# Patient Record
Sex: MALE | Race: WHITE | NOT HISPANIC OR LATINO | URBAN - METROPOLITAN AREA
[De-identification: names, ages, dates, MRNs, and addresses within clinical notes are randomized per-mention and may not be internally consistent; named-entity substitution may affect disease eponyms.]

---

## 2020-05-06 ENCOUNTER — EMERGENCY (EMERGENCY)
Facility: HOSPITAL | Age: 65
LOS: 0 days | Discharge: NURSING, REHAB OR LONG-TERM CARE | End: 2020-05-06
Attending: EMERGENCY MEDICINE | Admitting: EMERGENCY MEDICINE
Payer: COMMERCIAL

## 2020-05-06 VITALS
OXYGEN SATURATION: 100 % | DIASTOLIC BLOOD PRESSURE: 62 MMHG | SYSTOLIC BLOOD PRESSURE: 111 MMHG | HEART RATE: 95 BPM | TEMPERATURE: 96 F | RESPIRATION RATE: 16 BRPM

## 2020-05-06 DIAGNOSIS — E11.9 TYPE 2 DIABETES MELLITUS WITHOUT COMPLICATIONS: ICD-10-CM

## 2020-05-06 DIAGNOSIS — G20 PARKINSON'S DISEASE: ICD-10-CM

## 2020-05-06 DIAGNOSIS — Z79.899 OTHER LONG TERM (CURRENT) DRUG THERAPY: ICD-10-CM

## 2020-05-06 DIAGNOSIS — F03.90 UNSPECIFIED DEMENTIA WITHOUT BEHAVIORAL DISTURBANCE: ICD-10-CM

## 2020-05-06 DIAGNOSIS — Z87.891 PERSONAL HISTORY OF NICOTINE DEPENDENCE: ICD-10-CM

## 2020-05-06 DIAGNOSIS — R45.1 RESTLESSNESS AND AGITATION: ICD-10-CM

## 2020-05-06 DIAGNOSIS — Z79.82 LONG TERM (CURRENT) USE OF ASPIRIN: ICD-10-CM

## 2020-05-06 DIAGNOSIS — Z79.84 LONG TERM (CURRENT) USE OF ORAL HYPOGLYCEMIC DRUGS: ICD-10-CM

## 2020-05-06 LAB
ALBUMIN SERPL ELPH-MCNC: 4.5 G/DL — SIGNIFICANT CHANGE UP (ref 3.5–5.2)
ALP SERPL-CCNC: 92 U/L — SIGNIFICANT CHANGE UP (ref 30–115)
ALT FLD-CCNC: 7 U/L — SIGNIFICANT CHANGE UP (ref 0–41)
ANION GAP SERPL CALC-SCNC: 14 MMOL/L — SIGNIFICANT CHANGE UP (ref 7–14)
APAP SERPL-MCNC: <5 UG/ML — LOW (ref 10–30)
AST SERPL-CCNC: 13 U/L — SIGNIFICANT CHANGE UP (ref 0–41)
BASOPHILS # BLD AUTO: 0.03 K/UL — SIGNIFICANT CHANGE UP (ref 0–0.2)
BASOPHILS NFR BLD AUTO: 0.4 % — SIGNIFICANT CHANGE UP (ref 0–1)
BILIRUB SERPL-MCNC: 0.5 MG/DL — SIGNIFICANT CHANGE UP (ref 0.2–1.2)
BUN SERPL-MCNC: 37 MG/DL — HIGH (ref 10–20)
CALCIUM SERPL-MCNC: 9.5 MG/DL — SIGNIFICANT CHANGE UP (ref 8.5–10.1)
CHLORIDE SERPL-SCNC: 100 MMOL/L — SIGNIFICANT CHANGE UP (ref 98–110)
CO2 SERPL-SCNC: 25 MMOL/L — SIGNIFICANT CHANGE UP (ref 17–32)
CREAT SERPL-MCNC: 1.6 MG/DL — HIGH (ref 0.7–1.5)
EOSINOPHIL # BLD AUTO: 0.04 K/UL — SIGNIFICANT CHANGE UP (ref 0–0.7)
EOSINOPHIL NFR BLD AUTO: 0.5 % — SIGNIFICANT CHANGE UP (ref 0–8)
ETHANOL SERPL-MCNC: <10 MG/DL — SIGNIFICANT CHANGE UP
GLUCOSE SERPL-MCNC: 150 MG/DL — HIGH (ref 70–99)
HCT VFR BLD CALC: 36.8 % — LOW (ref 42–52)
HGB BLD-MCNC: 12.9 G/DL — LOW (ref 14–18)
IMM GRANULOCYTES NFR BLD AUTO: 0.4 % — HIGH (ref 0.1–0.3)
LYMPHOCYTES # BLD AUTO: 0.82 K/UL — LOW (ref 1.2–3.4)
LYMPHOCYTES # BLD AUTO: 9.7 % — LOW (ref 20.5–51.1)
MCHC RBC-ENTMCNC: 31.5 PG — HIGH (ref 27–31)
MCHC RBC-ENTMCNC: 35.1 G/DL — SIGNIFICANT CHANGE UP (ref 32–37)
MCV RBC AUTO: 89.8 FL — SIGNIFICANT CHANGE UP (ref 80–94)
MONOCYTES # BLD AUTO: 0.5 K/UL — SIGNIFICANT CHANGE UP (ref 0.1–0.6)
MONOCYTES NFR BLD AUTO: 5.9 % — SIGNIFICANT CHANGE UP (ref 1.7–9.3)
NEUTROPHILS # BLD AUTO: 7.07 K/UL — HIGH (ref 1.4–6.5)
NEUTROPHILS NFR BLD AUTO: 83.1 % — HIGH (ref 42.2–75.2)
NRBC # BLD: 0 /100 WBCS — SIGNIFICANT CHANGE UP (ref 0–0)
PLATELET # BLD AUTO: 223 K/UL — SIGNIFICANT CHANGE UP (ref 130–400)
POTASSIUM SERPL-MCNC: 4.9 MMOL/L — SIGNIFICANT CHANGE UP (ref 3.5–5)
POTASSIUM SERPL-SCNC: 4.9 MMOL/L — SIGNIFICANT CHANGE UP (ref 3.5–5)
PROT SERPL-MCNC: 7.3 G/DL — SIGNIFICANT CHANGE UP (ref 6–8)
RBC # BLD: 4.1 M/UL — LOW (ref 4.7–6.1)
RBC # FLD: 12.4 % — SIGNIFICANT CHANGE UP (ref 11.5–14.5)
SALICYLATES SERPL-MCNC: <0.3 MG/DL — LOW (ref 4–30)
SODIUM SERPL-SCNC: 139 MMOL/L — SIGNIFICANT CHANGE UP (ref 135–146)
WBC # BLD: 8.49 K/UL — SIGNIFICANT CHANGE UP (ref 4.8–10.8)
WBC # FLD AUTO: 8.49 K/UL — SIGNIFICANT CHANGE UP (ref 4.8–10.8)

## 2020-05-06 PROCEDURE — 90792 PSYCH DIAG EVAL W/MED SRVCS: CPT

## 2020-05-06 PROCEDURE — 99285 EMERGENCY DEPT VISIT HI MDM: CPT

## 2020-05-06 PROCEDURE — 70450 CT HEAD/BRAIN W/O DYE: CPT | Mod: 26

## 2020-05-06 NOTE — ED PROVIDER NOTE - OBJECTIVE STATEMENT
63 yo male with a pmh of DM, parkinson's, and dementia present from [facility] for multiple complaints. pt states he was sent because he was refused to take 2 doses of his medications. he states to experienced visual hallucinations for over 2 months but denies any SI/HI. denies any other symptoms including fevers, chill, headache, recent illness/travel, cough, abdominal pain, chest pain, or SOB. paperwork from [place] states pt has experiencing hallucinations, paranoia, word salad, self harm, aggression toward staff, and episodes of tremors w/ eyes rolling upward since March that worsened over the last 2 months.

## 2020-05-06 NOTE — ED PROVIDER NOTE - NS ED ROS FT
Constitutional: (-) fever  Eyes/ENT: (-) blurry vision, (-) epistaxis, (-) visual changes   Cardiovascular: (-) chest pain, (-) syncope  Respiratory: (-) cough, (-) shortness of breath  Gastrointestinal: (-) vomiting, (-) diarrhea  Genitourinary: (-) dysuria, (-) hesitancy, (-) frequency   Musculoskeletal: (-) neck pain, (-) back pain, (-) joint pain  Integumentary: (-) rash, (-) edema  Neurological: (-) headache, (-) altered mental status  Allergic/Immunologic: (-) pruritus

## 2020-05-06 NOTE — ED ADULT NURSE NOTE - OBJECTIVE STATEMENT
Pt brought in by ambulance from [facility]. As per EMT "He's here for a psych evaluation. He hasn't been taking his medications." As per paperwork "Self harm, word salad, paranoia, hallucinations, aggression toward staff, punching imaginary people. Episodes started in early March and getting worse in the last 2 weeks. Resident has also had episodes when his eyes roll upward and his tremor increases. MD requested full psychological evaluation."

## 2020-05-06 NOTE — ED ADULT TRIAGE NOTE - CHIEF COMPLAINT QUOTE
Pt brought in by ambulance from [facility]. As per EMT "He's here for a psych evaluation. He hasn't been taking his medications." Pt brought in by ambulance from [facility]. As per EMT "He's here for a psych evaluation. He hasn't been taking his medications." As per paperwork "Self harm, word salad, paranoia, hallucinations, aggression toward staff, punching imaginary people. Episodes started in early March and getting worse in the last 2 weeks. Resident has also had episodes when his eyes roll upward and his tremor increases. MD requested full psychological evaluation."

## 2020-05-06 NOTE — ED PROVIDER NOTE - CLINICAL SUMMARY MEDICAL DECISION MAKING FREE TEXT BOX
Patient is calm and co-operative asymptomatic at this time we have consulted psych who has  evaluated patient and deem him safe for return to his facility I will discharge at this time

## 2020-05-06 NOTE — ED ADULT NURSE NOTE - CHIEF COMPLAINT QUOTE
Pt brought in by ambulance from [facility]. As per EMT "He's here for a psych evaluation. He hasn't been taking his medications." As per paperwork "Self harm, word salad, paranoia, hallucinations, aggression toward staff, punching imaginary people. Episodes started in early March and getting worse in the last 2 weeks. Resident has also had episodes when his eyes roll upward and his tremor increases. MD requested full psychological evaluation."

## 2020-05-06 NOTE — ED PROVIDER NOTE - PATIENT PORTAL LINK FT
You can access the FollowMyHealth Patient Portal offered by [facility] by registering at the following website: [email or web address]. By joining [employer]’s FollowMyHealth portal, you will also be able to view your health information using other applications (apps) compatible with our system.

## 2020-05-06 NOTE — ED PROVIDER NOTE - PHYSICAL EXAMINATION
Gen: NAD, AOx3  Head: NCAT  HEENT: PERRL, oral mucosa moist, normal conjunctiva, oropharynx clear without exudate or erythema  Lung: CTAB, no respiratory distress, no wheezing, rales, rhonchi  CV: normal s1/s2, rrr, Normal perfusion, pulses 2+ throughout  Abd: soft, NTND, no CVA tenderness  Genitourinary: no pelvic tenderness  MSK: No edema, no visible deformities, full range of motion in all 4 extremities  Neuro: CN II-XII grossly intact, No focal neurologic deficits, no nystagmus/pronator drift, coordination/sensation intact, strength 5/5 BUE/BLE  Skin: No rash   Psych: normal affect Gen: NAD, AOx3  Head: NCAT  HEENT: PERRL, oral mucosa moist, normal conjunctiva, oropharynx clear without exudate or erythema  Lung: CTAB, no respiratory distress, no wheezing, rales, rhonchi  CV: normal s1/s2, rrr, Normal perfusion, pulses 2+ throughout  Abd: soft, NTND, no CVA tenderness  Genitourinary: no pelvic tenderness  MSK: No edema, no visible deformities, full range of motion in all 4 extremities  Neuro: CN II-XII grossly intact, No focal neurologic deficits, no nystagmus/pronator drift, coordination/sensation intact, strength 5/5 BUE/BLE  Skin: No rash   Psych: normal affect, well appearing, no aggression, no SI/HI, visual hallucinations

## 2020-05-06 NOTE — ED PROVIDER NOTE - ATTENDING CONTRIBUTION TO CARE
I was present for and supervised the key and critical aspects of the procedures performed during the care of the patient. Patient presents for evaluation of agitation prior to arrival he denies any fevers, chills headache, visual changes, chest pain or sob he denies any abdominal pain or back pain he has no focal deficits patient is calm and co-operative at this time we have consulted psych who evaluated patient and deem him safe for return to his facility

## 2020-05-06 NOTE — ED PROVIDER NOTE - NSFOLLOWUPINSTRUCTIONS_ED_ALL_ED_FT
Continue Seroquel 25mg twice a day as needed for agitation/depression. Please follow up with your primary care physician within 24-72 hours and return immediately if symptoms worsen.      Hallucinations    WHAT YOU NEED TO KNOW:    Hallucinations are things you see, hear, feel, taste, or smell that seem real but are not. Some hallucinations are temporary. Hallucinations that continue, interfere with daily activities, or worsen may be a sign of a serious medical or mental condition that needs treatment.    DISCHARGE INSTRUCTIONS:    Call 911 if you or someone else notices any of the following:     You want to harm yourself or someone else.      You hear voices telling you to harm yourself or someone else.      You have a seizure.      You are confused, do not know where you are, or are not making sense when you speak.    Seek care immediately if:     Your hallucinations come back after treatment.      You vomit several times in a row.      Your heartbeat or breathing is faster or slower than usual.       You have trouble breathing or shortness of breath.    Contact your healthcare provider if:     You have new hallucinations.      You have questions or concerns about your condition or care.    Medicines:     Medicines may be given to stop the hallucinations, reduce anxiety, or relax your muscles.      Take your medicine as directed. Contact your healthcare provider if you think your medicine is not helping or if you have side effects. Tell him or her if you are allergic to any medicine. Keep a list of the medicines, vitamins, and herbs you take. Include the amounts, and when and why you take them. Bring the list or the pill bottles to follow-up visits. Carry your medicine list with you in case of an emergency.    Follow up with your healthcare provider as directed: Write down your questions so you remember to ask them during your visits.       © Copyright [employer] 2020

## 2020-05-18 ENCOUNTER — INPATIENT (INPATIENT)
Facility: HOSPITAL | Age: 65
LOS: 11 days | Discharge: NURSING, REHAB OR LONG-TERM CARE | End: 2020-05-30
Attending: INTERNAL MEDICINE | Admitting: INTERNAL MEDICINE
Payer: COMMERCIAL

## 2020-05-18 VITALS
OXYGEN SATURATION: 97 % | SYSTOLIC BLOOD PRESSURE: 86 MMHG | DIASTOLIC BLOOD PRESSURE: 54 MMHG | RESPIRATION RATE: 19 BRPM | WEIGHT: 195.11 LBS | HEART RATE: 96 BPM | TEMPERATURE: 100 F

## 2020-05-18 DIAGNOSIS — N18.3 CHRONIC KIDNEY DISEASE, STAGE 3 (MODERATE): ICD-10-CM

## 2020-05-18 LAB
ALBUMIN SERPL ELPH-MCNC: 4.2 G/DL — SIGNIFICANT CHANGE UP (ref 3.5–5.2)
ALP SERPL-CCNC: 105 U/L — SIGNIFICANT CHANGE UP (ref 30–115)
ALT FLD-CCNC: <5 U/L — SIGNIFICANT CHANGE UP (ref 0–41)
ANION GAP SERPL CALC-SCNC: 22 MMOL/L — HIGH (ref 7–14)
APTT BLD: 25.8 SEC — LOW (ref 27–39.2)
AST SERPL-CCNC: 27 U/L — SIGNIFICANT CHANGE UP (ref 0–41)
BASOPHILS # BLD AUTO: 0.06 K/UL — SIGNIFICANT CHANGE UP (ref 0–0.2)
BASOPHILS NFR BLD AUTO: 0.6 % — SIGNIFICANT CHANGE UP (ref 0–1)
BILIRUB SERPL-MCNC: 0.5 MG/DL — SIGNIFICANT CHANGE UP (ref 0.2–1.2)
BLD GP AB SCN SERPL QL: SIGNIFICANT CHANGE UP
BUN SERPL-MCNC: 41 MG/DL — HIGH (ref 10–20)
CA-I SERPL-SCNC: 1.1 MMOL/L — LOW (ref 1.12–1.3)
CALCIUM SERPL-MCNC: 9.4 MG/DL — SIGNIFICANT CHANGE UP (ref 8.5–10.1)
CHLORIDE SERPL-SCNC: 101 MMOL/L — SIGNIFICANT CHANGE UP (ref 98–110)
CO2 SERPL-SCNC: 19 MMOL/L — SIGNIFICANT CHANGE UP (ref 17–32)
CREAT SERPL-MCNC: 2 MG/DL — HIGH (ref 0.7–1.5)
EOSINOPHIL # BLD AUTO: 0.07 K/UL — SIGNIFICANT CHANGE UP (ref 0–0.7)
EOSINOPHIL NFR BLD AUTO: 0.7 % — SIGNIFICANT CHANGE UP (ref 0–8)
GAS PNL BLDV: 141 MMOL/L — SIGNIFICANT CHANGE UP (ref 136–145)
GAS PNL BLDV: SIGNIFICANT CHANGE UP
GLUCOSE SERPL-MCNC: 84 MG/DL — SIGNIFICANT CHANGE UP (ref 70–99)
HCO3 BLDV-SCNC: 25 MMOL/L — SIGNIFICANT CHANGE UP (ref 22–29)
HCT VFR BLD CALC: 34.5 % — LOW (ref 42–52)
HCT VFR BLDA CALC: 52.3 % — HIGH (ref 34–44)
HGB BLD CALC-MCNC: 17.1 G/DL — SIGNIFICANT CHANGE UP (ref 14–18)
HGB BLD-MCNC: 11.9 G/DL — LOW (ref 14–18)
IMM GRANULOCYTES NFR BLD AUTO: 0.5 % — HIGH (ref 0.1–0.3)
INR BLD: 1.33 RATIO — HIGH (ref 0.65–1.3)
LACTATE BLDV-MCNC: 2.2 MMOL/L — HIGH (ref 0.5–1.6)
LACTATE SERPL-SCNC: 6.8 MMOL/L — CRITICAL HIGH (ref 0.7–2)
LYMPHOCYTES # BLD AUTO: 0.77 K/UL — LOW (ref 1.2–3.4)
LYMPHOCYTES # BLD AUTO: 7.9 % — LOW (ref 20.5–51.1)
MAGNESIUM SERPL-MCNC: 1.1 MG/DL — LOW (ref 1.8–2.4)
MCHC RBC-ENTMCNC: 31.2 PG — HIGH (ref 27–31)
MCHC RBC-ENTMCNC: 34.5 G/DL — SIGNIFICANT CHANGE UP (ref 32–37)
MCV RBC AUTO: 90.6 FL — SIGNIFICANT CHANGE UP (ref 80–94)
MONOCYTES # BLD AUTO: 0.66 K/UL — HIGH (ref 0.1–0.6)
MONOCYTES NFR BLD AUTO: 6.8 % — SIGNIFICANT CHANGE UP (ref 1.7–9.3)
NEUTROPHILS # BLD AUTO: 8.14 K/UL — HIGH (ref 1.4–6.5)
NEUTROPHILS NFR BLD AUTO: 83.5 % — HIGH (ref 42.2–75.2)
NRBC # BLD: 0 /100 WBCS — SIGNIFICANT CHANGE UP (ref 0–0)
NT-PROBNP SERPL-SCNC: 1002 PG/ML — HIGH (ref 0–300)
PCO2 BLDV: 43 MMHG — SIGNIFICANT CHANGE UP (ref 41–51)
PH BLDV: 7.38 — SIGNIFICANT CHANGE UP (ref 7.26–7.43)
PLATELET # BLD AUTO: 202 K/UL — SIGNIFICANT CHANGE UP (ref 130–400)
PO2 BLDV: 34 MMHG — SIGNIFICANT CHANGE UP (ref 20–40)
POTASSIUM BLDV-SCNC: 4 MMOL/L — SIGNIFICANT CHANGE UP (ref 3.3–5.6)
POTASSIUM SERPL-MCNC: 5 MMOL/L — SIGNIFICANT CHANGE UP (ref 3.5–5)
POTASSIUM SERPL-SCNC: 5 MMOL/L — SIGNIFICANT CHANGE UP (ref 3.5–5)
PROT SERPL-MCNC: 7.2 G/DL — SIGNIFICANT CHANGE UP (ref 6–8)
PROTHROM AB SERPL-ACNC: 15.3 SEC — HIGH (ref 9.95–12.87)
RBC # BLD: 3.81 M/UL — LOW (ref 4.7–6.1)
RBC # FLD: 12.6 % — SIGNIFICANT CHANGE UP (ref 11.5–14.5)
SAO2 % BLDV: 63 % — SIGNIFICANT CHANGE UP
SARS-COV-2 RNA SPEC QL NAA+PROBE: SIGNIFICANT CHANGE UP
SODIUM SERPL-SCNC: 142 MMOL/L — SIGNIFICANT CHANGE UP (ref 135–146)
TROPONIN T SERPL-MCNC: 0.02 NG/ML — HIGH
WBC # BLD: 9.75 K/UL — SIGNIFICANT CHANGE UP (ref 4.8–10.8)
WBC # FLD AUTO: 9.75 K/UL — SIGNIFICANT CHANGE UP (ref 4.8–10.8)

## 2020-05-18 PROCEDURE — 71275 CT ANGIOGRAPHY CHEST: CPT | Mod: 26

## 2020-05-18 PROCEDURE — 70450 CT HEAD/BRAIN W/O DYE: CPT | Mod: 26

## 2020-05-18 PROCEDURE — 99285 EMERGENCY DEPT VISIT HI MDM: CPT

## 2020-05-18 PROCEDURE — 72125 CT NECK SPINE W/O DYE: CPT | Mod: 26

## 2020-05-18 PROCEDURE — 99283 EMERGENCY DEPT VISIT LOW MDM: CPT

## 2020-05-18 RX ORDER — HEPARIN SODIUM 5000 [USP'U]/ML
5000 INJECTION INTRAVENOUS; SUBCUTANEOUS EVERY 8 HOURS
Refills: 0 | Status: DISCONTINUED | OUTPATIENT
Start: 2020-05-18 | End: 2020-05-27

## 2020-05-18 RX ORDER — CARBIDOPA AND LEVODOPA 25; 100 MG/1; MG/1
1 TABLET ORAL THREE TIMES A DAY
Refills: 0 | Status: DISCONTINUED | OUTPATIENT
Start: 2020-05-18 | End: 2020-05-20

## 2020-05-18 RX ORDER — QUETIAPINE FUMARATE 200 MG/1
0 TABLET, FILM COATED ORAL
Qty: 0 | Refills: 0 | DISCHARGE

## 2020-05-18 RX ORDER — FLUDROCORTISONE ACETATE 0.1 MG/1
0.1 TABLET ORAL DAILY
Refills: 0 | Status: DISCONTINUED | OUTPATIENT
Start: 2020-05-18 | End: 2020-05-20

## 2020-05-18 RX ORDER — FAMOTIDINE 10 MG/ML
0 INJECTION INTRAVENOUS
Qty: 0 | Refills: 0 | DISCHARGE

## 2020-05-18 RX ORDER — SENNA PLUS 8.6 MG/1
2 TABLET ORAL AT BEDTIME
Refills: 0 | Status: DISCONTINUED | OUTPATIENT
Start: 2020-05-18 | End: 2020-05-30

## 2020-05-18 RX ORDER — ATORVASTATIN CALCIUM 80 MG/1
20 TABLET, FILM COATED ORAL AT BEDTIME
Refills: 0 | Status: DISCONTINUED | OUTPATIENT
Start: 2020-05-18 | End: 2020-05-30

## 2020-05-18 RX ORDER — INSULIN LISPRO 100/ML
VIAL (ML) SUBCUTANEOUS
Refills: 0 | Status: DISCONTINUED | OUTPATIENT
Start: 2020-05-18 | End: 2020-05-27

## 2020-05-18 RX ORDER — QUETIAPINE FUMARATE 200 MG/1
25 TABLET, FILM COATED ORAL AT BEDTIME
Refills: 0 | Status: DISCONTINUED | OUTPATIENT
Start: 2020-05-18 | End: 2020-05-20

## 2020-05-18 RX ORDER — LATANOPROST 0.05 MG/ML
1 SOLUTION/ DROPS OPHTHALMIC; TOPICAL AT BEDTIME
Refills: 0 | Status: DISCONTINUED | OUTPATIENT
Start: 2020-05-18 | End: 2020-05-30

## 2020-05-18 RX ORDER — ASPIRIN/CALCIUM CARB/MAGNESIUM 324 MG
81 TABLET ORAL DAILY
Refills: 0 | Status: DISCONTINUED | OUTPATIENT
Start: 2020-05-18 | End: 2020-05-29

## 2020-05-18 RX ORDER — SODIUM CHLORIDE 9 MG/ML
1000 INJECTION INTRAMUSCULAR; INTRAVENOUS; SUBCUTANEOUS
Refills: 0 | Status: DISCONTINUED | OUTPATIENT
Start: 2020-05-18 | End: 2020-05-19

## 2020-05-18 RX ORDER — FAMOTIDINE 10 MG/ML
20 INJECTION INTRAVENOUS DAILY
Refills: 0 | Status: DISCONTINUED | OUTPATIENT
Start: 2020-05-18 | End: 2020-05-30

## 2020-05-18 RX ORDER — ACETAMINOPHEN 500 MG
650 TABLET ORAL EVERY 6 HOURS
Refills: 0 | Status: DISCONTINUED | OUTPATIENT
Start: 2020-05-18 | End: 2020-05-30

## 2020-05-18 NOTE — CONSULT NOTE ADULT - ASSESSMENT
ASSESSMENT:  64y old m s/p mechanical fall at NH. +HT, -LOC, -AC. Pt was transferred from [facility] for hypotension in setting of small pericardial fluid on FAST exam. Pt vitally stable in [place] ED, hyperTN. Ct head negative. Lactate 6.8, trop 0.02. LUBNA cr. 2.0    PLAN:    - No acute surgical intervention  - Trauma labs: cbc, cmp, mag phos, T&S, coags  - Repeat Lactate after resuscitation   - F/u Trauma imaging    -  d/w  Dr. Montaño ASSESSMENT:  64y old m s/p mechanical fall at NH. +HT, -LOC, -AC. Pt was transferred from [facility] for hypotension in setting of small pericardial fluid on FAST exam. Pt vitally stable in [place] ED, hyperTN. Ct head negative. Lactate 6.8, trop 0.02. LUBNA cr. 2.0    Traumatic injuries: compatible with non-displaced right 7th rib fx    PLAN:    - No acute surgical intervention  - Encourage incentive spirometer  - Cleared from trauma service   - Tertiary trauma survey in 24 hours    -  d/w  Dr. Montaño ASSESSMENT:  64y old m s/p mechanical fall at NH. +HT, -LOC, -AC. Pt was transferred from [facility] for hypotension in setting of small pericardial fluid on FAST exam. Pt vitally stable in [place] ED, hyperTN. Ct head negative. Lactate 6.8, trop 0.02. LUBNA cr. 2.0    Traumatic injuries: compatible with non-displaced right 7th rib fx    PLAN:    - No acute surgical intervention, Encourage incentive spirometer. pulling 3500cc on IS.  - Cleared from trauma service   - Tertiary trauma survey in 24 hours    -  d/w  Dr. Montaño

## 2020-05-18 NOTE — ED PROVIDER NOTE - ATTENDING CONTRIBUTION TO CARE
64 yr old male hx of parkinsons, DM, baby asa here for eval of weakness and fall. pt states he feels sick, has fallen a few times today. denies fever, chills, diarrhea. on exam pt chronically ill appearing, no distress, ncat, s1s2 ctab soft nt/nd, moving all ext gcs 15, left lower rib ttp, ttp lower lumbar.     pt initially hypotensive, improved with less than 1 L of ivf,. bedside sono showing + pericardial effusion.   spoke with trauma team at [place], will transfer. 64 yr old male hx of parkinsons, DM, baby asa here for eval of weakness and fall. pt states he feels sick, has fallen a few times today. denies fever, chills, diarrhea. Admits to hitting head. On exam pt chronically ill appearing, no distress, ncat, s1s2 ctab soft nt/nd, moving all ext gcs 15, left lower rib ttp, ttp lower lumbar.     pt initially hypotensive, improved with less than 1 L of ivf,. bedside sono showing + pericardial effusion.   spoke with trauma team at [place], will transfer.

## 2020-05-18 NOTE — ED PROVIDER NOTE - OBJECTIVE STATEMENT
63 yo M with PMHx of DM, mild dementia, HTN, parkinson's who was BIBEMS from [facility] for hypotension and falls. Per pt, states that he has been feeling lightheaded and dizzy for the past 2-3 wks without alleviating/aggravating factors. Today had 2 falls but does not remember mechanism. Hit the back of his head against the ground, no LOC. Currently complaining of pain to back of head, mid thoracic spine, R lower ribs. No fever, chills, cp, sob, abd pain, nausea, vomiting, dysuria, leg swelling. On ASA. Upon EMS arrival, BP 90s/50s. 65 yo M with PMHx of DM, mild dementia, HTN, parkinson's who was BIBEMS from [facility] for hypotension and falls. Per pt, states that he has been feeling lightheaded and dizzy for the past 2-3 wks without alleviating/aggravating factors. Today had 2 falls but does not remember mechanism. Hit the back of his head against the ground, no LOC. Currently complaining of pain to back of head, mid thoracic spine, R lower ribs. No fever, chills, cp, sob, abd pain, nausea, vomiting, dysuria, leg swelling, hematochezia, melena. On ASA. Upon EMS arrival, BP 90s/50s.

## 2020-05-18 NOTE — ED ADULT NURSE REASSESSMENT NOTE - NS ED NURSE REASSESS COMMENT FT1
Pt arrived to the ER as a transfer from [facility], pt is AOx3. 100% on room air  02. HR NS 90bpm. Pt is afebrile. IV dressing dry and intact, flushing without difficulty. Pt placed on stretcher and brought  to room.

## 2020-05-18 NOTE — ED ADULT TRIAGE NOTE - CHIEF COMPLAINT QUOTE
EARNEST from [place] as per EMS "He was walking about two hours ago and fell. and he has chest pain, no loc". pt currently on ASA EC 81 mg, fs 106

## 2020-05-18 NOTE — ED ADULT NURSE REASSESSMENT NOTE - NS ED NURSE REASSESS COMMENT FT1
Pt scheduled and ordered to go to CT. Pt updated on continuing plan of care. Pt verbalized an understanding of plan of care. No further questions at this time. Awaiting transport for CT. Will continue to monitor.

## 2020-05-18 NOTE — ED PROCEDURE NOTE - CPROC ED INDICATIONS1
GERD: AVOID tomatoes, onions, fried foods, caffeine, peppermint, alcohol, cigarettes. Stay upright for two hours after meals and keep head end of bed at 30 degree angle  Asymptomatic on present regimen   hypotension

## 2020-05-18 NOTE — ED ADULT NURSE NOTE - NS ED NURSE AMBULANCES2
"Yolande Kiran LPN               "
[facility]

## 2020-05-18 NOTE — ED PROVIDER NOTE - CARE PLAN
Principal Discharge DX:	Fall  Secondary Diagnosis:	Hypotension  Secondary Diagnosis:	Pericardial effusion

## 2020-05-18 NOTE — H&P ADULT - ASSESSMENT
64 year old gentleman with a history of Parkinson's disease (w/ mild dementia), DM2, and HTN who was BIBEMS from [facility] for multiple falls on the day of presentation, associated with a subacute decline in mental and functional status per nursing home report.    #Recurrent falls w/ acute R 7th rib fracture: ddx includes mechanical fall from postural/gait instability 2/2 to Parkinson's vs orthostasis 2/2 to Parkinson's induced autonomic dysfunction (would explain the hypotension and improvement without significant fluids and his description of lightheadedness) vs cardiogenic etiology (patient complaining of palpitations, also with pericardial effusion on POCUS, BNP slightly elevated although seems euvolemic on exam)  -TTE (no prior records in our system)  -continue with fludrocortisone 0.1mg - consider increasing if positive orthostatics   -trend trops x 2 (first sample hemolyzed, EKG NSR w/ no ST-segment changes, unlikely ACS)  -check orthostatics  -check B12. folate, TSH  -PT/rehab evaluation  -pain control prn    #Parkinson's disease w/ mild dementia: per nursing home, has had a worsening of his confusion and forgetfulness recently  -continue with carbidopa/levadopa 25mg-100mg tid  -continue with seroquel 25mg qhs  -avoid first generation antipsychotics if agitated as it may worsen his Parkinsonism - can try extra doses of seroquel prn for agitation    #Lactic acidosis  -unclear etiology, may be related to chronic metformin use  -trending down with minimal fluids    #LUBNA on CKD3?  -baseline SCr 1.6 earlier in May, now 2.0 on admission  -likely pre-renal in setting of poor PO intake  -will start gentle hydration with NS @ 50mL/hr  -send urine lytes  -trend daily SCr    #Normocytic anemia: hemoglobin around baseline  -check B12, folate, TSH    #DM  -hold home meformin and glipizide  -check FS qac and qhs  -start sliding scale   -start basal bolus if consistently elevated >200    #HLD  -continue with simvastatin 20mg  -continue with aspirin 81mg qd     #HTN: not on any home meds  -monitor BP and orthostatics  -start low dose amlodipine 5mg if needed for BP control  ________________________________________________________________  DVT ppx: heparin subq  GI ppx: pepcid (home med)  Activity: ambulate as tolerated  Diet: consistent carb  Code status: full code    Dispo: admit to regular medicine floor (COVID negative). Needs echo, repeat labs, PT/rehab consult. From [facility]

## 2020-05-18 NOTE — ED ADULT NURSE NOTE - NSIMPLEMENTINTERV_GEN_ALL_ED
Implemented All Fall with Harm Risk Interventions:  [place] to call system. Call bell, personal items and telephone within reach. Instruct patient to call for assistance. Room bathroom lighting operational. Non-slip footwear when patient is off stretcher. Physically safe environment: no spills, clutter or unnecessary equipment. Stretcher in lowest position, wheels locked, appropriate side rails in place. Provide visual cue, wrist band, yellow gown, etc. Monitor gait and stability. Monitor for mental status changes and reorient to person, place, and time. Review medications for side effects contributing to fall risk. Reinforce activity limits and safety measures with patient and family. Provide visual clues: red socks.

## 2020-05-18 NOTE — ED PROVIDER NOTE - PROGRESS NOTE DETAILS
systolic bp 123 after 500 cc of fluids, pt noted to have small pericardial effusion. will call trauma team discussed with dr vargas, [facility] for completion of trauma ibrahim. pt with pericardial effusion on US exam. ems here for patient. no change in condition TC: Late entry. 65 yo M with PMHx of DM, mild dementia, HTN, parkinson's who was BIBEMS from [facility] for dizziness x 2-3 wks and 2 falls today. Pt unable to recall mechanism or if he had preceding cardiac sx. +head trauma, no LOC, on ASA. Here in ED, BP 80s/50s, HR 90s-100s, rectal temp 99.8. +tenderness to R lower ribs and midline T-spine. Ordered labs, ekg, xrays, panscan. POCUS showed pericardial effusion. Pt transferred to [place] ED for trauma eval. BP 140s/80s upon departure. TC: Late entry. 65 yo M with PMHx of DM, mild dementia, HTN, parkinson's who was BIBEMS from [facility] for dizziness x 2-3 wks and 2 falls today. Pt unable to recall mechanism or if he had preceding cardiac sx. +head trauma, no LOC, on ASA. Here in ED, BP 80s/50s, HR 90s-100s, rectal temp 99.8. +tenderness to R lower ribs and midline T-spine. Ordered labs, ekg, xrays, panscan. POCUS showed pericardial effusion. Pt transferred to [place] ED for trauma eval. BP 140s/80s upon departure. Spoke with son Julius ([phone]) who is aware pt was transferred [place], and updated on findings/plan, has no further questions at this time. SR: patient arrived to north, vitals stable patient c/o left upper back pain. CT scans ordered. cleared from trauma. repeat lactate improved from 6.8 to 2.2. will admit to medicine cleared from trauma. repeat lactate improved from 6.8 to 2.2. will admit to medicine. spoke with son and updated him on plan.

## 2020-05-18 NOTE — H&P ADULT - NSHPPHYSICALEXAM_GEN_ALL_CORE
VITAL SIGNS: Afebrile, vital signs stable  CONSTITUTIONAL: Well-developed; well-nourished; in no acute distress.  SKIN: Skin exam is warm and dry, no acute rash.  HEAD: Normocephalic; atraumatic.  EYES: Pupils equal round reactive to light, Extraocular movements intact; conjunctiva and sclera clear.  ENT: No nasal discharge; airway clear. Moist mucus membranes.  NECK: Supple; non tender. No rigidity  CARD: Regular rate and rhythm. Normal S1, S2. 2/6 holosystolic murmur heard at RUSB  RESP: Lungs clear to auscultation bilaterally. No wheezes, rales or rhonchi. Mild point tenderness over R lower chest  ABD: Abdomen soft; non-tender; non-distended;  no hepatosplenomegaly. No costovertebral angle tenderness.   EXT: Normal ROM. No clubbing, cyanosis or edema. No calf tenderness to palpation.  NEURO: Alert. Oriented to person, knows it is the hospital but not which one, does not know city. Knows it is 2020 and Irma  is president. States he is in the hospital due to "abnormal labs". Mild R sided facial droop. Cranial nerves otherwise intact. No motor or sensory deficits. Bilateral tremors of upper extremity, R>L  PSYCH: Cooperative. at times will forget where he is and state "I need to go to work". Is easily distracted (i.e. when he heard a bell ring he said "they're calling for me)

## 2020-05-18 NOTE — ED PROVIDER NOTE - CLINICAL SUMMARY MEDICAL DECISION MAKING FREE TEXT BOX
EY: Patient transferred north from south by Dr. harris, patient evaluated for trauma, cleared by trauma service, will admit to medicine, lactate improved.

## 2020-05-18 NOTE — H&P ADULT - HISTORY OF PRESENT ILLNESS
Patient is a 64 year old gentleman with a history of Parkinson's disease (w/ mild dementia), DM2, and HTN who was BIBEMS from [facility] for multiple falls on the day of presentation. Per patient history, he had 3 falls today. The first two were early in the morning, the last one was in the evening. He states that he had been standing for a few minutes before each fall and suddenly felt lightheaded/"woozy". He fell back and hit his head all three times. He denies any preceding confusion, chest pain, shortness of breath but does not endorse feeling as if his heart was racing throughout the day. He states after the fall he was able to get back up right away with no loss of consciousness or post-fall confusion. He denies any associated urinary/fecal incontinence. He states that he has been feeling palpitations intermittently for the past few months. ROS negative for any fevers/chills, vision changes, sore throat/runny nose, chest pain, abdominal pain, diarrhea/constipation. Patient reports has recently stared walking with a walker for stability for the past few months. Per [facility], patient has been having more frequent falls over the last few days and after two falls today they decided to have him sent in for reevaluation. They have also noticed a decline in his mental status where has been more confused and forgetful recently. Linton corroborates that there is no documentation of LOC or incontinence with falls today.    Patient initially presented to [facility]. BP on arrival was 90s/50s, HR 96, with lactate of 6.8; point of care ultrasound demonstrated small pericardial effusion. He was transferred to [place] for further workup. His BP on presentation to [place] was 146/84, HR 94, lactate 2.2 after receiving 1L of LR. Trauma workup was negative except for non-displaced fracture of R lateral 7th rib. Patient was cleared by trauma service and will be admitted to medicine for further workup of recurrent falls.

## 2020-05-18 NOTE — ED PROVIDER NOTE - PHYSICAL EXAMINATION
CONSTITUTIONAL: well developed, well nourished, no acute distress  TRAUMA: ABC intact, GCS 15  HEAD: normocephalic, tenderness to posterior occiput  EYES: PERRL at 3 mm, EOMI, no raccoon eyes, pale conjunctiva  ENT: no nasal discharge, no septal hematoma, no lakhani sign, moist mucous membranes, no mandibular instability, no mandibular malalignment  NECK: no midline tenderness, no stepoffs, no deformity  CV: regular rate, regular rhythm, equal distal pulses  RESP: lungs clear to auscultation bilaterally, normal work of breathing, symmetric rise and fall of chest   CHEST: lower anterior R rib tenderness, no crepitus, no clavicular deformity/tenting  ABD: soft, nondistended, nontender, no rebound, no guarding,  no pelvic instability  BACK: + midline T- spine tenderness, no midline L/S-spine tenderness, no stepoffs, no deformity  EXT: no obvious deformity, no tenderness of extremities, full ROM, cap refill < 2 seconds  SKIN: no rashes, no lacerations, no abrasions  NEURO: A&Ox3, moves all extremities, sensation grossly intact throughout, no focal neurological deficits, GCS 15  PSYCH: normal mood, appropriate affect CONSTITUTIONAL: well developed, well nourished, no acute distress  TRAUMA: ABC intact, GCS 15  HEAD: normocephalic, tenderness to posterior occiput  EYES: PERRL at 3 mm, EOMI, no raccoon eyes, pale conjunctiva  ENT: no nasal discharge, no septal hematoma, no lakhani sign, moist mucous membranes, no mandibular instability, no mandibular malalignment  NECK: no midline tenderness, no stepoffs, no deformity  CV: regular rate, regular rhythm, equal distal pulses  RESP: lungs clear to auscultation bilaterally, normal work of breathing, symmetric rise and fall of chest   CHEST: lower anterior R rib tenderness, no crepitus, no clavicular deformity/tenting  ABD: soft, nondistended, nontender, no rebound, no guarding,  no pelvic instability  BACK: + midline T- spine tenderness, no midline L/S-spine tenderness, no stepoffs, no deformity  RECTAL: no gross blood per rectum, soft brown stool, chaperone RN Dinesh present  EXT: no obvious deformity, no tenderness of extremities, full ROM, cap refill < 2 seconds  SKIN: no rashes, no lacerations, no abrasions  NEURO: A&Ox3, moves all extremities, sensation grossly intact throughout, no focal neurological deficits, GCS 15  PSYCH: normal mood, appropriate affect

## 2020-05-18 NOTE — H&P ADULT - NSHPREVIEWOFSYSTEMS_GEN_ALL_CORE
Review of Systems:  CONSTITUTIONAL - No fever, No diaphoresis, No weight change  SKIN - No rash  HEMATOLOGIC - No abnormal bleeding or bruising  EYES - No eye pain, No blurred vision  ENT - No change in hearing, No sore throat, No neck pain, No rhinorrhea, No ear pain  RESPIRATORY - No shortness of breath, No cough  CARDIAC -No chest pain, No palpitations  GI - No abdominal pain, No nausea, No vomiting, No diarrhea, No constipation, No bright red blood per rectum or melena. No flank pain  - No dysuria, frequency, hematuria.   ENDO - No polydypsia, No polyuria, No heat/cold intolerance  MUSCULOSKELETAL - No joint paint, No swelling, No back pain  NEUROLOGIC - No numbness, No focal weakness, No headache, No dizziness  All other systems negative, unless specified in HPI

## 2020-05-18 NOTE — H&P ADULT - ATTENDING COMMENTS
I saw and evaluated the patient. I have reviewed and agree with the findings and plan of care as documented above in the resident’s note (unless indicated differently below). Any necessary changes were made in the body of the text.    Recurrent falls in settings of Parkinson's disease (with postural instability, cogwheel rigidity and bradycardia) and dementia; doubt cardiac etiology (pt recalls the falls and hx is more suggestive of them being mechanical in etiology.   The lactic acid elevation may have been secondary to dehydration and hypoperfusion - ?parkinson associated autonomic dysfunction (pt was relatively hypotensive at the time of presentation).   Mild LUBNA - agree with gentle hydration.    Plan:   Orthostats  ECHO  Hydration  B12/folate/TSH  PT/Rehab  Meds as dosed I saw and evaluated the patient. I have reviewed and agree with the findings and plan of care as documented above in the resident’s note (unless indicated differently below). Any necessary changes were made in the body of the text.    65 yo M pt presenting after multiple falls on the day of presentation. Pt is NH resident. He says that he remembers falling and had no prodromal symptoms prior to the fall. Says that he lost his footing. Denies LOC. Denies any other symptoms. Says that he feels well at the time of this eval. Of note, as per the patient's NH, the patient has also had episodes of confusion.    ROS:  Constitutional: no fevers; no chills  Eyes: no conjunctivitis; no itching  ENT: no dysphagia; no odynophagia  CVS: no LE swelling; no PND; no chest pain  Resp: no SOB; no coughing  GI: no nausea; no vomiting; no diarrhea; no abd pain  : no dysuria; no hematuria  MSK: +falls  Neuro: no focal weakness  All other systems reviewed and are negative    PMHx, SurHx, Home medications, FHx and Soc Hx as documented above.    Vitals: BP = 183/98; P = 82; T = 98; RR 18; SpO2 of 98 on room air  General: appears stated age; cooperative  Eyes: anicteric sclerae; moist conjunctiva  HENT: AT/NC; clear oropharynx  Neck: supple; trachea midline  Lungs: lungs cta b/l; no wheezing, rales or rhonci  CVS: RRR; S1 and S2 w/o MRG’s  Abd: BS+; soft and non-tender to palpation x 4; no masses or HSM  Extremities: pulses 2+ b/l; non edematous  Psych: appropriate affect; alert and oriented to person but thought he was at home and not in the hospital; knew year  Neuro: CN II-XII intact; str 5/5 all extremities; sensation – grossly intact; tremors noted (w/ arms extended > on rt); some cogwheeling  Skin: no rashes, ulcers or nodules    Labs w/ CPK elevation  Imaginth rib fx right, no intracranial abnormalities  EKG: NSR; qTC 411    Assessment:  Recurrent falls in settings of Parkinson's disease (with postural instability, cogwheel rigidity and bradycardia) and dementia; doubt cardiac etiology (pt recalls the falls and hx is more suggestive of them being mechanical in etiology.   The lactic acid elevation may have been secondary to dehydration and hypoperfusion - ?parkinson associated autonomic dysfunction (pt was relatively hypotensive at the time of presentation).   Mild LUBNA - agree with gentle hydration.  CPK elevation 2/2 fall (mild)    Plan:   (1) Orthostats  (2) ECHO  (3) Hydration  (4) B12/folate/TSH  (5) PT/Rehab  (6) Meds as dosed    Code status: full code

## 2020-05-18 NOTE — ED PROVIDER NOTE - NS ED ROS FT
GEN:  no fever, no chills, + generalized weakness  NEURO: + headache, + dizziness  ENT: no sore throat, no runny nose  CV:  no chest pain, no palpitations  RESP:  no sob, no cough  GI:  no nausea, no vomiting, no abdominal pain, no diarrhea  :  no dysuria, no urinary frequency, no hematuria  MSK:  + back pain, no edema  SKIN:  no rash, no bruising  HEME: no hematochezia, no melena

## 2020-05-19 PROBLEM — F03.90 UNSPECIFIED DEMENTIA WITHOUT BEHAVIORAL DISTURBANCE: Chronic | Status: ACTIVE | Noted: 2020-05-06

## 2020-05-19 PROBLEM — G20 PARKINSON'S DISEASE: Chronic | Status: ACTIVE | Noted: 2020-05-06

## 2020-05-19 PROBLEM — E13.9 OTHER SPECIFIED DIABETES MELLITUS WITHOUT COMPLICATIONS: Chronic | Status: ACTIVE | Noted: 2020-05-06

## 2020-05-19 LAB
ALBUMIN SERPL ELPH-MCNC: 4.1 G/DL — SIGNIFICANT CHANGE UP (ref 3.5–5.2)
ALP SERPL-CCNC: 106 U/L — SIGNIFICANT CHANGE UP (ref 30–115)
ALT FLD-CCNC: 11 U/L — SIGNIFICANT CHANGE UP (ref 0–41)
ANION GAP SERPL CALC-SCNC: 15 MMOL/L — HIGH (ref 7–14)
AST SERPL-CCNC: 48 U/L — HIGH (ref 0–41)
BASOPHILS # BLD AUTO: 0.04 K/UL — SIGNIFICANT CHANGE UP (ref 0–0.2)
BASOPHILS NFR BLD AUTO: 0.6 % — SIGNIFICANT CHANGE UP (ref 0–1)
BILIRUB SERPL-MCNC: 0.6 MG/DL — SIGNIFICANT CHANGE UP (ref 0.2–1.2)
BUN SERPL-MCNC: 33 MG/DL — HIGH (ref 10–20)
CALCIUM SERPL-MCNC: 8.8 MG/DL — SIGNIFICANT CHANGE UP (ref 8.5–10.1)
CHLORIDE SERPL-SCNC: 105 MMOL/L — SIGNIFICANT CHANGE UP (ref 98–110)
CK MB CFR SERPL CALC: 18.9 NG/ML — HIGH (ref 0.6–6.3)
CK MB CFR SERPL CALC: 22.3 NG/ML — HIGH (ref 0.6–6.3)
CK SERPL-CCNC: 1520 U/L — HIGH (ref 0–225)
CK SERPL-CCNC: 1783 U/L — HIGH (ref 0–225)
CK SERPL-CCNC: 1837 U/L — HIGH (ref 0–225)
CO2 SERPL-SCNC: 24 MMOL/L — SIGNIFICANT CHANGE UP (ref 17–32)
CREAT SERPL-MCNC: 1.1 MG/DL — SIGNIFICANT CHANGE UP (ref 0.7–1.5)
EOSINOPHIL # BLD AUTO: 0.14 K/UL — SIGNIFICANT CHANGE UP (ref 0–0.7)
EOSINOPHIL NFR BLD AUTO: 2.1 % — SIGNIFICANT CHANGE UP (ref 0–8)
FOLATE SERPL-MCNC: 11.5 NG/ML — SIGNIFICANT CHANGE UP
GLUCOSE BLDC GLUCOMTR-MCNC: 120 MG/DL — HIGH (ref 70–99)
GLUCOSE BLDC GLUCOMTR-MCNC: 124 MG/DL — HIGH (ref 70–99)
GLUCOSE BLDC GLUCOMTR-MCNC: 86 MG/DL — SIGNIFICANT CHANGE UP (ref 70–99)
GLUCOSE SERPL-MCNC: 82 MG/DL — SIGNIFICANT CHANGE UP (ref 70–99)
HCT VFR BLD CALC: 34.3 % — LOW (ref 42–52)
HCV AB S/CO SERPL IA: 0.04 COI — SIGNIFICANT CHANGE UP
HCV AB SERPL-IMP: SIGNIFICANT CHANGE UP
HGB BLD-MCNC: 11.8 G/DL — LOW (ref 14–18)
IMM GRANULOCYTES NFR BLD AUTO: 0.4 % — HIGH (ref 0.1–0.3)
LACTATE SERPL-SCNC: 0.9 MMOL/L — SIGNIFICANT CHANGE UP (ref 0.7–2)
LACTATE SERPL-SCNC: 1 MMOL/L — SIGNIFICANT CHANGE UP (ref 0.7–2)
LYMPHOCYTES # BLD AUTO: 1.19 K/UL — LOW (ref 1.2–3.4)
LYMPHOCYTES # BLD AUTO: 17.6 % — LOW (ref 20.5–51.1)
MAGNESIUM SERPL-MCNC: 1.2 MG/DL — LOW (ref 1.8–2.4)
MCHC RBC-ENTMCNC: 31 PG — SIGNIFICANT CHANGE UP (ref 27–31)
MCHC RBC-ENTMCNC: 34.4 G/DL — SIGNIFICANT CHANGE UP (ref 32–37)
MCV RBC AUTO: 90 FL — SIGNIFICANT CHANGE UP (ref 80–94)
MONOCYTES # BLD AUTO: 0.51 K/UL — SIGNIFICANT CHANGE UP (ref 0.1–0.6)
MONOCYTES NFR BLD AUTO: 7.5 % — SIGNIFICANT CHANGE UP (ref 1.7–9.3)
NEUTROPHILS # BLD AUTO: 4.86 K/UL — SIGNIFICANT CHANGE UP (ref 1.4–6.5)
NEUTROPHILS NFR BLD AUTO: 71.8 % — SIGNIFICANT CHANGE UP (ref 42.2–75.2)
NRBC # BLD: 0 /100 WBCS — SIGNIFICANT CHANGE UP (ref 0–0)
PLATELET # BLD AUTO: 209 K/UL — SIGNIFICANT CHANGE UP (ref 130–400)
POTASSIUM SERPL-MCNC: 3.7 MMOL/L — SIGNIFICANT CHANGE UP (ref 3.5–5)
POTASSIUM SERPL-SCNC: 3.7 MMOL/L — SIGNIFICANT CHANGE UP (ref 3.5–5)
PROT SERPL-MCNC: 6.8 G/DL — SIGNIFICANT CHANGE UP (ref 6–8)
RBC # BLD: 3.81 M/UL — LOW (ref 4.7–6.1)
RBC # FLD: 12.5 % — SIGNIFICANT CHANGE UP (ref 11.5–14.5)
SODIUM SERPL-SCNC: 144 MMOL/L — SIGNIFICANT CHANGE UP (ref 135–146)
TROPONIN T SERPL-MCNC: 0.01 NG/ML — SIGNIFICANT CHANGE UP
TROPONIN T SERPL-MCNC: 0.01 NG/ML — SIGNIFICANT CHANGE UP
TSH SERPL-MCNC: 1.76 UIU/ML — SIGNIFICANT CHANGE UP (ref 0.27–4.2)
VIT B12 SERPL-MCNC: 461 PG/ML — SIGNIFICANT CHANGE UP (ref 232–1245)
WBC # BLD: 6.77 K/UL — SIGNIFICANT CHANGE UP (ref 4.8–10.8)
WBC # FLD AUTO: 6.77 K/UL — SIGNIFICANT CHANGE UP (ref 4.8–10.8)

## 2020-05-19 PROCEDURE — 99223 1ST HOSP IP/OBS HIGH 75: CPT

## 2020-05-19 PROCEDURE — 93306 TTE W/DOPPLER COMPLETE: CPT | Mod: 26

## 2020-05-19 RX ORDER — MAGNESIUM SULFATE 500 MG/ML
2 VIAL (ML) INJECTION EVERY 4 HOURS
Refills: 0 | Status: DISCONTINUED | OUTPATIENT
Start: 2020-05-19 | End: 2020-05-19

## 2020-05-19 RX ORDER — SODIUM CHLORIDE 9 MG/ML
1000 INJECTION INTRAMUSCULAR; INTRAVENOUS; SUBCUTANEOUS
Refills: 0 | Status: DISCONTINUED | OUTPATIENT
Start: 2020-05-19 | End: 2020-05-20

## 2020-05-19 RX ORDER — MAGNESIUM SULFATE 500 MG/ML
2 VIAL (ML) INJECTION
Refills: 0 | Status: COMPLETED | OUTPATIENT
Start: 2020-05-19 | End: 2020-05-19

## 2020-05-19 RX ORDER — TIMOLOL 0.5 %
1 DROPS OPHTHALMIC (EYE) AT BEDTIME
Refills: 0 | Status: DISCONTINUED | OUTPATIENT
Start: 2020-05-19 | End: 2020-05-30

## 2020-05-19 RX ADMIN — Medication 1 DROP(S): at 21:01

## 2020-05-19 RX ADMIN — CARBIDOPA AND LEVODOPA 1 TABLET(S): 25; 100 TABLET ORAL at 20:57

## 2020-05-19 RX ADMIN — CARBIDOPA AND LEVODOPA 1 TABLET(S): 25; 100 TABLET ORAL at 05:22

## 2020-05-19 RX ADMIN — ATORVASTATIN CALCIUM 20 MILLIGRAM(S): 80 TABLET, FILM COATED ORAL at 20:57

## 2020-05-19 RX ADMIN — SENNA PLUS 2 TABLET(S): 8.6 TABLET ORAL at 20:57

## 2020-05-19 RX ADMIN — FLUDROCORTISONE ACETATE 0.1 MILLIGRAM(S): 0.1 TABLET ORAL at 05:22

## 2020-05-19 RX ADMIN — Medication 81 MILLIGRAM(S): at 11:45

## 2020-05-19 RX ADMIN — QUETIAPINE FUMARATE 25 MILLIGRAM(S): 200 TABLET, FILM COATED ORAL at 20:57

## 2020-05-19 RX ADMIN — SODIUM CHLORIDE 50 MILLILITER(S): 9 INJECTION INTRAMUSCULAR; INTRAVENOUS; SUBCUTANEOUS at 00:51

## 2020-05-19 RX ADMIN — HEPARIN SODIUM 5000 UNIT(S): 5000 INJECTION INTRAVENOUS; SUBCUTANEOUS at 13:36

## 2020-05-19 RX ADMIN — LATANOPROST 1 DROP(S): 0.05 SOLUTION/ DROPS OPHTHALMIC; TOPICAL at 20:58

## 2020-05-19 RX ADMIN — CARBIDOPA AND LEVODOPA 1 TABLET(S): 25; 100 TABLET ORAL at 13:35

## 2020-05-19 RX ADMIN — Medication 50 GRAM(S): at 18:52

## 2020-05-19 RX ADMIN — Medication 25 GRAM(S): at 10:18

## 2020-05-19 RX ADMIN — Medication 50 GRAM(S): at 13:36

## 2020-05-19 RX ADMIN — HEPARIN SODIUM 5000 UNIT(S): 5000 INJECTION INTRAVENOUS; SUBCUTANEOUS at 05:18

## 2020-05-19 RX ADMIN — Medication 25 GRAM(S): at 07:06

## 2020-05-19 RX ADMIN — HEPARIN SODIUM 5000 UNIT(S): 5000 INJECTION INTRAVENOUS; SUBCUTANEOUS at 20:57

## 2020-05-19 RX ADMIN — FAMOTIDINE 20 MILLIGRAM(S): 10 INJECTION INTRAVENOUS at 11:45

## 2020-05-19 NOTE — PHYSICAL THERAPY INITIAL EVALUATION ADULT - GENERAL OBSERVATIONS, REHAB EVAL
Pt encountered in the bed, NAD, agreeable for b/s PT IE. Pt left in the bed post tx all needs with in reach.

## 2020-05-19 NOTE — OCCUPATIONAL THERAPY INITIAL EVALUATION ADULT - SITTING BALANCE: DYNAMIC
Patient is traveling and left metoprolol tartrate 50 mg at home would like a 10 day supply sent to [facility]  
fair plus

## 2020-05-19 NOTE — PHYSICAL THERAPY INITIAL EVALUATION ADULT - CRITERIA FOR SKILLED THERAPEUTIC INTERVENTIONS
impairments found/functional limitations in following categories/therapy frequency/rehab potential/predicted duration of therapy intervention/anticipated discharge recommendation

## 2020-05-19 NOTE — PHYSICAL THERAPY INITIAL EVALUATION ADULT - PERTINENT HX OF CURRENT PROBLEM, REHAB EVAL
Patient is a 64 year old gentleman with a history of Parkinson's disease (w/ mild dementia), DM2, and HTN who was BIBEMS from [facility] for multiple falls on the day of presentation. Per patient history, he had 3 falls today. Patient initially presented to [facility]. BP on arrival was 90s/50s, HR 96, with lactate of 6.8; point of care ultrasound demonstrated small pericardial effusion. He was transferred to [place] for further workup. His BP on presentation to [place] was 146/84, HR 94.

## 2020-05-19 NOTE — CHART NOTE - NSCHARTNOTEFT_GEN_A_CORE
Called by RN that the patient was having hallucinations and was agitated - trying to climb out of bed.    Patient has a history of Parkinson's disease (is on carbidopa levodopa) and dementia coming in with recurrent falls.    Patient seen and evaluated.     Impression:   Parkinsons disease w/ dementia   Hallucinations 2/2 medication effect / PD   Sundowning/delirium     Plan:   1:1 for safety and given history of recurrent falls  Quetiapine 25 mg qHS - titrate up PRN  Avoid haloperidol / typical anti-psychotics  Will request Neurology evaluation for assistance w/ medication/Parkinson's management    Case discussed w/ Nursing staff

## 2020-05-19 NOTE — CHART NOTE - NSCHARTNOTEFT_GEN_A_CORE
Trauma tertiary Survey    Patient seen and examined. No complaints at this time.     PHYSICAL EXAM:  Craniofacial: Atraumatic, No deformity  Eyes: Pupil Size equal B/L and RTL. EOMI  Oropharynx: Atraumatic  Neck: Non-tender, No deformity, Trachea midline.  Chest: Equal breath sounds, non-tender, No deformity or crepitus  Heart: RSR, No murmurs, no rubs  Abdomen: Atraumatic, Non-tender, non-distended. No hepatosplenomegaly  Pelvis: Stable, non-tender, no deformity  : Atraumatic, no blood at the meatus or priapism  Back: Spine non-tender, Atraumatic  Extremities: Atraumatic, no deformities, normal pulses, moving all extremities   Neurologic: CN intact, Motor intact throughout. Sensation intact/normal throughout.  Psych: Mood and affect normal. Judgment and insight normal    All images/reports reviewed. No further traumatic work-up warranted. Trauma tertiary Survey    Patient seen and examined. Patient sitting up in bed comfortably. No complaints at this time. States pain is improving significantly and has been out of bed with physical therapy.     PHYSICAL EXAM:  Craniofacial: Atraumatic, No deformity  Eyes: Pupil Size equal B/L and RTL. EOMI  Oropharynx: Atraumatic  Neck: Non-tender, No deformity, Trachea midline.  Chest: Equal breath sounds. Mild tenderness over chest wall. No deformity or crepitus  Heart: RSR, No murmurs, no rubs  Abdomen: Atraumatic, Non-tender, non-distended. No hepatosplenomegaly  Pelvis: Stable, non-tender, no deformity  Back: Spine non-tender, Atraumatic  Extremities: Atraumatic, no deformities, normal pulses, moving all extremities   Neurologic: CN intact, Motor intact throughout. Sensation intact/normal throughout.  Psych: Mood and affect normal. Judgment and insight normal    All images/reports reviewed. No further traumatic work-up warranted.

## 2020-05-19 NOTE — OCCUPATIONAL THERAPY INITIAL EVALUATION ADULT - RANGE OF MOTION EXAMINATION, UPPER EXTREMITY
R shoulder AROM to 3/4th range; elbow distally WFL/bilateral UE Active ROM was WFL  (within functional limits)

## 2020-05-19 NOTE — CONSULT NOTE ADULT - SUBJECTIVE AND OBJECTIVE BOX
HPI:  Patient is a 64 year old gentleman with a history of Parkinson's disease (w/ mild dementia), DM2, and HTN who was BIBEMS from [facility] for multiple falls on the day of presentation. Per patient history, he had 3 falls today. The first two were early in the morning, the last one was in the evening. He states that he had been standing for a few minutes before each fall and suddenly felt lightheaded/"woozy". He fell back and hit his head all three times. He denies any preceding confusion, chest pain, shortness of breath but does not endorse feeling as if his heart was racing throughout the day. He states after the fall he was able to get back up right away with no loss of consciousness or post-fall confusion. He denies any associated urinary/fecal incontinence. He states that he has been feeling palpitations intermittently for the past few months. ROS negative for any fevers/chills, vision changes, sore throat/runny nose, chest pain, abdominal pain, diarrhea/constipation. Patient reports has recently stared walking with a walker for stability for the past few months. Per [facility], patient has been having more frequent falls over the last few days and after two falls today they decided to have him sent in for reevaluation. They have also noticed a decline in his mental status where has been more confused and forgetful recently. [place] corroborates that there is no documentation of LOC or incontinence with falls today.    Patient initially presented to [facility]. BP on arrival was 90s/50s, HR 96, with lactate of 6.8; point of care ultrasound demonstrated small pericardial effusion. He was transferred to [place] for further workup. His BP on presentation to [place] was 146/84, HR 94, lactate 2.2 after receiving 1L of LR. Trauma workup was negative except for non-displaced fracture of R lateral 7th rib. Patient was cleared by trauma service and will be admitted to medicine for further workup of recurrent falls. (18 May 2020 23:10)      PAST MEDICAL & SURGICAL HISTORY:  Mild dementia  Parkinson's disease  Diabetes 1.5, managed as type 2  No significant past surgical history      Hospital Course:    TODAY'S SUBJECTIVE & REVIEW OF SYMPTOMS:     Constitutional WNL   Cardio WNL   Resp WNL   GI WNL  Heme WNL  Endo WNL  Skin WNL  MSK Weakness  Neuro WNL  Cognitive WNL  Psych WNL      MEDICATIONS  (STANDING):  aspirin enteric coated 81 milliGRAM(s) Oral daily  atorvastatin 20 milliGRAM(s) Oral at bedtime  carbidopa/levodopa  25/100 1 Tablet(s) Oral three times a day  famotidine  Oral Tab/Cap - Peds 20 milliGRAM(s) Oral daily  fludroCORTISONE 0.1 milliGRAM(s) Oral daily  heparin   Injectable 5000 Unit(s) SubCutaneous every 8 hours  insulin lispro (HumaLOG) corrective regimen sliding scale   SubCutaneous three times a day before meals  latanoprost 0.005% Ophthalmic Solution 1 Drop(s) Both EYES at bedtime  magnesium sulfate  IVPB 2 Gram(s) IV Intermittent every 2 hours  QUEtiapine 25 milliGRAM(s) Oral at bedtime  senna 2 Tablet(s) Oral at bedtime  sodium chloride 0.9%. 1000 milliLiter(s) (50 mL/Hr) IV Continuous <Continuous>    MEDICATIONS  (PRN):  acetaminophen   Tablet .. 650 milliGRAM(s) Oral every 6 hours PRN Mild Pain (1 - 3), Moderate Pain (4 - 6)      FAMILY HISTORY:  No pertinent family history in first degree relatives      Allergies    No Known Allergies    Intolerances        SOCIAL HISTORY:    [  ] Etoh  [  ] Smoking  [  ] Substance abuse     Home Environment:  [  ] Home Alone  [  ] Lives with Family  [  ] Home Health Aid    Dwelling:  [  ] Apartment  [  ] Private House  [  ] Adult Home  [ x ] Skilled Nursing Facility      [  ] Short Term  [  ] Long Term  [  ] Stairs       Elevator [  ]    FUNCTIONAL STATUS PTA: (Check all that apply)  Ambulation: [ x  ]Independent    [  ] Dependent     [  ] Non-Ambulatory  Assistive Device: [  ] SA Cane  [  ]  Q Cane  [x  ] Walker  [  ]  Wheelchair  ADL : [  ] Independent  [ x ]  Dependent       Vital Signs Last 24 Hrs  T(C): 36.7 (19 May 2020 05:43), Max: 37.7 (18 May 2020 16:41)  T(F): 98 (19 May 2020 05:43), Max: 99.8 (18 May 2020 16:41)  HR: 82 (19 May 2020 05:43) (82 - 112)  BP: 183/98 (19 May 2020 05:43) (86/54 - 203/95)  BP(mean): --  RR: 18 (19 May 2020 05:43) (16 - 20)  SpO2: 98% (18 May 2020 23:57) (97% - 100%)      PHYSICAL EXAM: Alert & awake  GENERAL: NAD  HEAD:  Atraumatic, Normocephalic  CHEST/LUNG: Clear   HEART: S1S2+  ABDOMEN: Soft, Nontender  EXTREMITIES:  no calf tenderness    NERVOUS SYSTEM:  Cranial Nerves 2-12 intact [  ] Abnormal  [  ]  ROM: WFL all extremities [ x ]  Abnormal [  ]  Motor Strength: WFL all extremities  [  ]  Abnormal [ x4/5 all ext ]  Sensation: intact to light touch [ x ] Abnormal [  ]  Reflexes: Symmetric [  ]  Abnormal [  ]    FUNCTIONAL STATUS:  Bed Mobility: Independent [  ]  Supervision [  ]  Needs Assistance [ x ]  N/A [  ]  Transfers: Independent [  ]  Supervision [  ]  Needs Assistance [x  ]  N/A [  ]   Ambulation: Independent [  ]  Supervision [  ]  Needs Assistance [  ]  N/A [  ]  ADL: Independent [  ] Requires Assistance [  ] N/A [  ]      LABS:                        11.8   6.77  )-----------( 209      ( 19 May 2020 05:37 )             34.3     05-19    144  |  105  |  33<H>  ----------------------------<  82  3.7   |  24  |  1.1    Ca    8.8      19 May 2020 05:37  Mg     1.2     05-19    TPro  6.8  /  Alb  4.1  /  TBili  0.6  /  DBili  x   /  AST  48<H>  /  ALT  11  /  AlkPhos  106  05-19    PT/INR - ( 18 May 2020 17:00 )   PT: 15.30 sec;   INR: 1.33 ratio         PTT - ( 18 May 2020 17:00 )  PTT:25.8 sec      RADIOLOGY & ADDITIONAL STUDIES:    Assesment:

## 2020-05-19 NOTE — OCCUPATIONAL THERAPY INITIAL EVALUATION ADULT - ADDITIONAL COMMENTS
Pt. is a poor historian of his PLOF 2* pt was confused throughout IE and noncompliant with providing reliable information. Pt at baseline with +mild dementia & parkinsons.

## 2020-05-19 NOTE — OCCUPATIONAL THERAPY INITIAL EVALUATION ADULT - NS ASR FOLLOW COMMAND OT EVAL
[facility]  Hospitalist Consult Note  Name: Nadege Valenzuela    MRN: [id number]  YOB: 1954    Age: 63 year old  Date of admission: 1/8/2018  Primary care provider: Ramu Rodrigues     Requesting Physician:  Dr. 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    1. DJD s/p lumbar fusion on 1/8/18. The patient is doing well, currently has well controlled pain and is hemodynamically stable. Will defer diet, activity, DVT prophylaxis, and pain control to the primary team.  Continue physical and occupational therapy.  --As per his pre-op (and in the setting of CAD and prior though remote CVA) it was recommended in his pre-op that he continue his ASA mg post-operatively  I will defer exact timing of this to Neurosurgery pending any evidence of blood loss/bleeding risk based on his specific surgery.    2. DM2: on oral agents at home w/ good control.  Medium scale SSI ordered here.  Resume farxiga, metformin, januvia pending BG trend and PO intake.  --update: BG trending up.  Will restart metformin today, readdress other meds in the AM.    3.  HTN: on sole 5 mg lisinopril at home.  Resumed w/ hold parameters.    4.  Reported remote hx of embolic CVA.  Per his report this was shortly after his MI (in [id number]) and he got his care at [facility]. Restart ASA when able.      5.  Reported hx of MI in 1999.  Details of this are unclear and is uncertain if he had a stent placed.  Is on ASA 81 mg chronically with ongoing risk factor modification/management of htn DM2.    Code status: full  Prophylaxis: per primary surgical team.      Thank you for the consultation, we will continue to follow along during the hospitalization. Please  page with any questions or concerns.         History of Present Illness:   Nadege Valenzuela is a 63 year old male w/ hx of HTN, DM2 on oral agents, remote hx of embolic CVA, remote hx of reported MI in 1999 who was hospitalized for lumbar spinal fusion. Pre-operative note was fully reviewed and recommendations acknowledged.  It was recommended he resume aspirin post-op.  He still does have a drain in place.  He notes he feels he is doing well post-op.  His wife is present and helps provide some of the history.    The patient had no complications related to the procedure and has had an unremarkable post-operative course to this point. Currently pain is adequately controlled. No nausea, vomiting, diarrhea or constipation. No fevers, chills, diaphoresis. No chest pain, palpitations, dyspnea. Tolerating oral intake. No excessive somnolence and patient is fully alert and oriented. The patient has no other complaints at this time.                Past Medical History:     Past Medical History:   Diagnosis Date     Arthritis      Cerebral embolism with cerebral infarction (H) 10/21/2011     Coronary artery disease      Diabetes mellitus (H)      Heart attack      Hypertension     No cardiologist     Stented coronary artery     Pt unsure if he had a stent             Past Surgical History:     Past Surgical History:   Procedure Laterality Date     BACK SURGERY      cervical fusion     LAPAROSCOPIC APPENDECTOMY  10/21/2011    Procedure:LAPAROSCOPIC APPENDECTOMY; Laparoscopic appendectomy; Surgeon:ROMI MCFARLAND; Location: OR               Social History:     Social History   Substance Use Topics     Smoking status: Former Smoker     Packs/day: 2.00     Years: 20.00     Types: Cigarettes     Quit date: 12/22/1998     Smokeless tobacco: Never Used     Alcohol use Yes      Comment: 2 beers monthly             Family History:   Family history was fully reviewed and non-contributory in this case.          Allergies:   No  "Known Allergies          Medications:     Prior to Admission medications    Medication Sig Last Dose Taking? Auth Provider   GABAPENTIN PO Take 300 mg by mouth 3 times daily 1/4/2018 Yes Reported, Patient   metFORMIN (GLUCOPHAGE) 500 MG tablet Take 1,000 mg by mouth 2 times daily (with meals) 1/4/2018 Yes Reported, Patient   rosuvastatin (CRESTOR) 20 MG tablet Take 1 tablet (20 mg) by mouth daily 1/7/2018 at Unknown time Yes Ramu Rodrigues PA-C   sitagliptin (JANUVIA) 100 MG tablet Take 1 tablet (100 mg) by mouth daily 1/7/2018 at Unknown time Yes Ramu Rodrigues PA-C   lisinopril (PRINIVIL/ZESTRIL) 5 MG tablet Take 1 tablet (5 mg) by mouth daily 1/7/2018 at 1200 Yes Ramu Rodrigues PA-C   dapagliflozin (FARXIGA) 10 MG TABS tablet Take 1 tablet (10 mg) by mouth daily 1/7/2018 at Unknown time Yes Ramu Rodrigues PA-C   aspirin 81 MG tablet Take 1 tablet by mouth daily. 1/3/2018 Yes Reported, Patient       Current hospital administered medication list (MAR) also reviewed.          Review of Systems:   A comprehensive greater than 10 system review of systems was carried out.  Pertinent positives and negatives are noted above.  Otherwise negative for contributory info.            Physical Exam:   Blood pressure 121/57, pulse 73, temperature 96.5  F (35.8  C), temperature source Oral, resp. rate 16, height 1.676 m (5' 6\"), weight 70.6 kg (155 lb 11.2 oz), SpO2 99 %.  Exam:  GENERAL: No apparent distress. Awake, alert, and fully oriented.  HEENT: Normocephalic, atraumatic. Extraocular movements intact.  CARDIOVASCULAR: Regular rate and rhythm without murmurs or rubs. No S3.  PULMONARY: Clear bilaterally.  ABDOMINAL: Soft, non-tender, non-distended. Bowel sounds normoactive. No hepatosplenomegaly.  EXTREMITIES: No cyanosis or clubbing. No edema.  NEUROLOGICAL: CN 2-12 grossly intact, no focal neurological deficits.  DERMATOLOGICAL: No rash, ulcer, ecchymoses, jaundice.         Data:   Imaging:  " Reviewed.    EKG/Telemetry:  Reviewed.    Labs: Reviewed.     Recent Labs  Lab 01/02/18  1107   HGB 14.7          Lab Results   Component Value Date     01/02/2018     10/06/2017     08/26/2016    Lab Results   Component Value Date    CHLORIDE 107 01/02/2018    CHLORIDE 105 10/06/2017    CHLORIDE 101 08/26/2016    Lab Results   Component Value Date    BUN 19 01/02/2018    BUN 11 10/06/2017    BUN 16 08/26/2016      Lab Results   Component Value Date    POTASSIUM 4.6 01/02/2018    POTASSIUM 3.8 10/06/2017    POTASSIUM 4.6 08/26/2016    Lab Results   Component Value Date    CO2 26 01/02/2018    CO2 22 10/06/2017    CO2 27 08/26/2016    Lab Results   Component Value Date    CR 0.89 01/02/2018    CR 0.96 10/06/2017    CR 0.86 08/26/2016          Laz Alex MD  [facility] Hospitalist  January 9, 2018     able to follow single-step instructions/requires mod re-direction/75% of the time

## 2020-05-19 NOTE — OCCUPATIONAL THERAPY INITIAL EVALUATION ADULT - PERTINENT HX OF CURRENT PROBLEM, REHAB EVAL
Patient is a 64 year old gentleman with a history of Parkinson's disease (w/ mild dementia), DM2, and HTN who was BIBEMS from [facility] for multiple falls on the day of presentation. Per patient history, he had 3 falls today. Patient initially presented to [facility]. BP on arrival was 90s/50s, HR 96, with lactate of 6.8; point of care ultrasound demonstrated small pericardial effusion. He was transferred to [place] for further workup. His BP on presentation to [place] was 146/84, HR 94.

## 2020-05-19 NOTE — OCCUPATIONAL THERAPY INITIAL EVALUATION ADULT - GENERAL OBSERVATIONS, REHAB EVAL
Pt. encountered and left beside c +IV drip, not agreeable to IE & required max encouragement to participate. Pt. left in bed c +IV drip however, pt. attempted to get out of bed multiple time. RN Kate & Nurse Manager Candice made aware pt needs BLE Silver stocks 2* Orthostatic hypotension & possible 1:1 sit for superversion 2* dec. safety awareness and high fall risks.

## 2020-05-19 NOTE — CONSULT NOTE ADULT - ASSESSMENT
IMPRESSION: Rehab of gait dysfunction     PRECAUTIONS: [  ] Cardiac  [  ] Respiratory  [  ] Seizures [  ] Contact Isolation  [  ] Droplet Isolation  [  ] Other    Weight Bearing Status:     RECOMMENDATION:    Out of Bed to Chair     DVT/Decubiti Prophylaxis    REHAB PLAN:     [x   ] Bedside P/T 3-5 times a week   [   ]   Bedside O/T  2-3 times a week             [   ] No Rehab Therapy Indicated                   [   ]  Speech Therapy   Conditioning/ROM                                    ADL  Bed Mobility                                               Conditioning/ROM  Transfers                                                     Bed Mobility  Sitting /Standing Balance                         Transfers                                        Gait Training                                               Sitting/Standing Balance  Stair Training [   ]Applicable                    Home equipment Eval                                                                        Splinting  [   ] Only      GOALS:   ADL   [   ]   Independent                    Transfers  [ x  ] Independent                          Ambulation  [ x  ] Independent     [   x ] With device                            [   ]  CG                                                         [   ]  CG                                                                  [   ] CG                            [    ] Min A                                                   [   ] Min A                                                              [   ] Min  A          DISCHARGE PLAN:   [   ]  Good candidate for Intensive Rehabilitation/Hospital based                                             Will tolerate 3hrs Intensive Rehab Daily                                       [  x  ]  Short Term Rehab in Skilled Nursing Facility / [place]                                       [    ]  Home with Outpatient or  services                                         [    ]  Possible Candidate for Intensive Hospital based Rehab

## 2020-05-20 LAB
ANION GAP SERPL CALC-SCNC: 12 MMOL/L — SIGNIFICANT CHANGE UP (ref 7–14)
BUN SERPL-MCNC: 21 MG/DL — HIGH (ref 10–20)
CALCIUM SERPL-MCNC: 8.9 MG/DL — SIGNIFICANT CHANGE UP (ref 8.5–10.1)
CHLORIDE SERPL-SCNC: 107 MMOL/L — SIGNIFICANT CHANGE UP (ref 98–110)
CK SERPL-CCNC: 932 U/L — HIGH (ref 0–225)
CO2 SERPL-SCNC: 25 MMOL/L — SIGNIFICANT CHANGE UP (ref 17–32)
CREAT ?TM UR-MCNC: 60 MG/DL — SIGNIFICANT CHANGE UP
CREAT SERPL-MCNC: 0.8 MG/DL — SIGNIFICANT CHANGE UP (ref 0.7–1.5)
GLUCOSE BLDC GLUCOMTR-MCNC: 110 MG/DL — HIGH (ref 70–99)
GLUCOSE BLDC GLUCOMTR-MCNC: 96 MG/DL — SIGNIFICANT CHANGE UP (ref 70–99)
GLUCOSE SERPL-MCNC: 84 MG/DL — SIGNIFICANT CHANGE UP (ref 70–99)
MAGNESIUM SERPL-MCNC: 1.8 MG/DL — SIGNIFICANT CHANGE UP (ref 1.8–2.4)
OSMOLALITY UR: 508 MOS/KG — SIGNIFICANT CHANGE UP (ref 50–1400)
POTASSIUM SERPL-MCNC: 3.9 MMOL/L — SIGNIFICANT CHANGE UP (ref 3.5–5)
POTASSIUM SERPL-SCNC: 3.9 MMOL/L — SIGNIFICANT CHANGE UP (ref 3.5–5)
PROT ?TM UR-MCNC: 22 MG/DLG/24H — SIGNIFICANT CHANGE UP
PROT/CREAT UR-RTO: 0.4 RATIO — HIGH (ref 0–0.2)
SODIUM SERPL-SCNC: 144 MMOL/L — SIGNIFICANT CHANGE UP (ref 135–146)
SODIUM UR-SCNC: 113 MMOL/L — SIGNIFICANT CHANGE UP

## 2020-05-20 PROCEDURE — 99232 SBSQ HOSP IP/OBS MODERATE 35: CPT

## 2020-05-20 PROCEDURE — 99233 SBSQ HOSP IP/OBS HIGH 50: CPT

## 2020-05-20 PROCEDURE — 99254 IP/OBS CNSLTJ NEW/EST MOD 60: CPT

## 2020-05-20 PROCEDURE — 99222 1ST HOSP IP/OBS MODERATE 55: CPT

## 2020-05-20 RX ORDER — CARBIDOPA AND LEVODOPA 25; 100 MG/1; MG/1
1 TABLET ORAL EVERY 6 HOURS
Refills: 0 | Status: DISCONTINUED | OUTPATIENT
Start: 2020-05-20 | End: 2020-05-30

## 2020-05-20 RX ORDER — SODIUM CHLORIDE 9 MG/ML
1000 INJECTION INTRAMUSCULAR; INTRAVENOUS; SUBCUTANEOUS
Refills: 0 | Status: DISCONTINUED | OUTPATIENT
Start: 2020-05-20 | End: 2020-05-20

## 2020-05-20 RX ORDER — MIDODRINE HYDROCHLORIDE 2.5 MG/1
5 TABLET ORAL THREE TIMES A DAY
Refills: 0 | Status: DISCONTINUED | OUTPATIENT
Start: 2020-05-20 | End: 2020-05-21

## 2020-05-20 RX ORDER — AMLODIPINE BESYLATE 2.5 MG/1
5 TABLET ORAL DAILY
Refills: 0 | Status: DISCONTINUED | OUTPATIENT
Start: 2020-05-20 | End: 2020-05-20

## 2020-05-20 RX ORDER — DROXIDOPA 100 MG/1
100 CAPSULE ORAL THREE TIMES A DAY
Refills: 0 | Status: DISCONTINUED | OUTPATIENT
Start: 2020-05-20 | End: 2020-05-26

## 2020-05-20 RX ORDER — HALOPERIDOL DECANOATE 100 MG/ML
1 INJECTION INTRAMUSCULAR EVERY 6 HOURS
Refills: 0 | Status: DISCONTINUED | OUTPATIENT
Start: 2020-05-20 | End: 2020-05-21

## 2020-05-20 RX ADMIN — CARBIDOPA AND LEVODOPA 1 TABLET(S): 25; 100 TABLET ORAL at 18:32

## 2020-05-20 RX ADMIN — FAMOTIDINE 20 MILLIGRAM(S): 10 INJECTION INTRAVENOUS at 12:35

## 2020-05-20 RX ADMIN — ATORVASTATIN CALCIUM 20 MILLIGRAM(S): 80 TABLET, FILM COATED ORAL at 21:34

## 2020-05-20 RX ADMIN — CARBIDOPA AND LEVODOPA 1 TABLET(S): 25; 100 TABLET ORAL at 06:18

## 2020-05-20 RX ADMIN — LATANOPROST 1 DROP(S): 0.05 SOLUTION/ DROPS OPHTHALMIC; TOPICAL at 21:34

## 2020-05-20 RX ADMIN — HEPARIN SODIUM 5000 UNIT(S): 5000 INJECTION INTRAVENOUS; SUBCUTANEOUS at 13:01

## 2020-05-20 RX ADMIN — MIDODRINE HYDROCHLORIDE 5 MILLIGRAM(S): 2.5 TABLET ORAL at 13:01

## 2020-05-20 RX ADMIN — HEPARIN SODIUM 5000 UNIT(S): 5000 INJECTION INTRAVENOUS; SUBCUTANEOUS at 21:32

## 2020-05-20 RX ADMIN — DROXIDOPA 100 MILLIGRAM(S): 100 CAPSULE ORAL at 23:34

## 2020-05-20 RX ADMIN — SENNA PLUS 2 TABLET(S): 8.6 TABLET ORAL at 21:34

## 2020-05-20 RX ADMIN — HEPARIN SODIUM 5000 UNIT(S): 5000 INJECTION INTRAVENOUS; SUBCUTANEOUS at 06:18

## 2020-05-20 RX ADMIN — HALOPERIDOL DECANOATE 1 MILLIGRAM(S): 100 INJECTION INTRAMUSCULAR at 18:32

## 2020-05-20 RX ADMIN — Medication 81 MILLIGRAM(S): at 12:35

## 2020-05-20 RX ADMIN — CARBIDOPA AND LEVODOPA 1 TABLET(S): 25; 100 TABLET ORAL at 23:35

## 2020-05-20 RX ADMIN — FLUDROCORTISONE ACETATE 0.1 MILLIGRAM(S): 0.1 TABLET ORAL at 06:18

## 2020-05-20 RX ADMIN — SODIUM CHLORIDE 100 MILLILITER(S): 9 INJECTION INTRAMUSCULAR; INTRAVENOUS; SUBCUTANEOUS at 11:31

## 2020-05-20 RX ADMIN — Medication 1 DROP(S): at 21:34

## 2020-05-20 NOTE — PROGRESS NOTE ADULT - ASSESSMENT
a/p:    #mechanical fall- severe orthostatics (>100 mmhg drop in systolic bp upon standing) with worsening PD symptoms and mild LUBNA  -cont gentle ivf hydration (also monitor daily ck--today ck 1520)  -fall appears mechanical (patient lives at [facility] and no report of loc however in setting of significant orthostatic changes will get cardiology consult and upgrade to telemetry for monitoring  -stop seroquel (as possible cause of worsening orthostasis)  -slow position change---for now continue only bed rest  -patient came in already on florinef---stop florinef and start droxydopa---repeat orthostatics daily  -will start midodrine 5 mg q8hr  -neurology consult  -increase dose sinemet 25/100 q6hr  -fall precautions  -ECHO  -monitor electrolytes   -covid negative/ bcx negative x 2 (no underlying infectious etiology)    #Parkinsons Disease  -neurology eval  -ct head noncontrast   -continue sinemet---increase dose 2/2 worsening symptoms  -fall precautions    #DVT/GI ppx  guarded prognosis  FULL CODE

## 2020-05-20 NOTE — CONSULT NOTE ADULT - SUBJECTIVE AND OBJECTIVE BOX
Neurology Consult    Patient is a 64y old  Male who presents with a chief complaint of Fall (19 May 2020 08:45)      HPI:  Patient is a 64 year old gentleman with a history of Parkinson's disease (w/ mild dementia), DM2, and HTN who was BIBEMS from [facility] for multiple falls on the day of presentation. Per patient history, he had 3 falls today. The first two were early in the morning, the last one was in the evening. He states that he had been standing for a few minutes before each fall and suddenly felt lightheaded/"woozy". He fell back and hit his head all three times. He denies any preceding confusion, chest pain, shortness of breath but does not endorse feeling as if his heart was racing throughout the day. He states after the fall he was able to get back up right away with no loss of consciousness or post-fall confusion. He denies any associated urinary/fecal incontinence. He states that he has been feeling palpitations intermittently for the past few months. ROS negative for any fevers/chills, vision changes, sore throat/runny nose, chest pain, abdominal pain, diarrhea/constipation. Patient reports has recently stared walking with a walker for stability for the past few months. Per [facility], patient has been having more frequent falls over the last few days and after two falls today they decided to have him sent in for reevaluation. They have also noticed a decline in his mental status where has been more confused and forgetful recently. [place] corroborates that there is no documentation of LOC or incontinence with falls today.    Patient initially presented to [facility]. BP on arrival was 90s/50s, HR 96, with lactate of 6.8; point of care ultrasound demonstrated small pericardial effusion. He was transferred to [place] for further workup. His BP on presentation to [place] was 146/84, HR 94, lactate 2.2 after receiving 1L of LR. Trauma workup was negative except for non-displaced fracture of R lateral 7th rib. Patient was cleared by trauma service and will be admitted to medicine for further workup of recurrent falls. (18 May 2020 23:10)      PAST MEDICAL & SURGICAL HISTORY:  Mild dementia  Parkinson's disease  Diabetes 1.5, managed as type 2  No significant past surgical history      FAMILY HISTORY:  No pertinent family history in first degree relatives      Social History: (-) x 3    Allergies    No Known Allergies    Intolerances        MEDICATIONS  (STANDING):  aspirin enteric coated 81 milliGRAM(s) Oral daily  atorvastatin 20 milliGRAM(s) Oral at bedtime  carbidopa/levodopa  25/100 1 Tablet(s) Oral three times a day  famotidine  Oral Tab/Cap - Peds 20 milliGRAM(s) Oral daily  fludroCORTISONE 0.1 milliGRAM(s) Oral daily  heparin   Injectable 5000 Unit(s) SubCutaneous every 8 hours  insulin lispro (HumaLOG) corrective regimen sliding scale   SubCutaneous three times a day before meals  latanoprost 0.005% Ophthalmic Solution 1 Drop(s) Both EYES at bedtime  midodrine. 5 milliGRAM(s) Oral three times a day  QUEtiapine 25 milliGRAM(s) Oral at bedtime  senna 2 Tablet(s) Oral at bedtime  sodium chloride 0.9%. 1000 milliLiter(s) (100 mL/Hr) IV Continuous <Continuous>  timolol 0.5% Solution 1 Drop(s) Both EYES at bedtime    MEDICATIONS  (PRN):  acetaminophen   Tablet .. 650 milliGRAM(s) Oral every 6 hours PRN Mild Pain (1 - 3), Moderate Pain (4 - 6)      Review of systems:    Constitutional: as per HPI  Eyes: No eye pain or discharge  ENMT:  No difficulty hearing; No sinus or throat pain  Neck: No pain or stiffness  Respiratory: No cough, wheezing, chills or hemoptysis  Cardiovascular: No chest pain, palpitations, shortness of breath, dyspnea on exertion  Gastrointestinal: No abdominal pain, nausea, vomiting or hematemesis; No diarrhea or constipation.   Genitourinary: No dysuria, frequency, hematuria or incontinence  Neurological: As per HPI  Skin: No rashes or lesions   Endocrine: No heat or cold intolerance; No hair loss  Musculoskeletal: No joint pain or swelling  Psychiatric: No depression, anxiety, mood swings  Heme/Lymph: No easy bruising or bleeding gums    Vital Signs Last 24 Hrs  T(C): 36.5 (20 May 2020 06:00), Max: 36.5 (20 May 2020 06:00)  T(F): 97.7 (20 May 2020 06:00), Max: 97.7 (20 May 2020 06:00)  HR: 76 (20 May 2020 06:00) (76 - 112)  BP: 197/101 (20 May 2020 06:00) (136/61 - 197/101)  BP(mean): --  RR: 18 (20 May 2020 10:33) (18 - 18)  SpO2: --    Examination:  A&O x 2 (person, place)  Bradykinetic, Bradyphrenic, masked facies   Cogwheel rigidity noted in UE R>L   resting tremor noted  intact VA, VFF.  EOMI w/o nystagmus, skew or reported double vision.  PERRL.   Facial sensation normal V1 - 3, no facial asymmetry.     Hearing grossly intact b/l.  Palate elevates midline.  Tongue and uvula midline.   Muscle strength: 5/5 UE; 5/5 LE.  No observable drift.  Sensation Intact to light touch in all extremities.  Gait deferred     Labs:   CBC Full  -  ( 19 May 2020 05:37 )  WBC Count : 6.77 K/uL  RBC Count : 3.81 M/uL  Hemoglobin : 11.8 g/dL  Hematocrit : 34.3 %  Platelet Count - Automated : 209 K/uL  Mean Cell Volume : 90.0 fL  Mean Cell Hemoglobin : 31.0 pg  Mean Cell Hemoglobin Concentration : 34.4 g/dL  Auto Neutrophil # : 4.86 K/uL  Auto Lymphocyte # : 1.19 K/uL  Auto Monocyte # : 0.51 K/uL  Auto Eosinophil # : 0.14 K/uL  Auto Basophil # : 0.04 K/uL  Auto Neutrophil % : 71.8 %  Auto Lymphocyte % : 17.6 %  Auto Monocyte % : 7.5 %  Auto Eosinophil % : 2.1 %  Auto Basophil % : 0.6 %    05-20    144  |  107  |  21<H>  ----------------------------<  84  3.9   |  25  |  0.8    Ca    8.9      20 May 2020 05:41  Mg     1.8     05-20    TPro  6.8  /  Alb  4.1  /  TBili  0.6  /  DBili  x   /  AST  48<H>  /  ALT  11  /  AlkPhos  106  05-19    LIVER FUNCTIONS - ( 19 May 2020 05:37 )  Alb: 4.1 g/dL / Pro: 6.8 g/dL / ALK PHOS: 106 U/L / ALT: 11 U/L / AST: 48 U/L / GGT: x           PT/INR - ( 18 May 2020 17:00 )   PT: 15.30 sec;   INR: 1.33 ratio         PTT - ( 18 May 2020 17:00 )  PTT:25.8 sec        Orthostatics:   Lying 186/92  Sitting 91/53  Standing 87/60      Neuroimaging:  NCT:     05-20-20 @ 11:41    < from: CT Head No Cont (05.18.20 @ 19:31) >  EXAM:  CT BRAIN            PROCEDURE DATE:  05/18/2020            INTERPRETATION:  CLINICAL HISTORY: Trauma.    COMPARISON: 5/6/2020.    TECHNIQUE: Images were obtained from the skull base to the vertex without the administration of intravenous contrast. Sagittal and coronal reformatted images were also obtained.    FINDINGS:    Ventricles and sulci are age-appropriate.    No intracranial hemorrhage, mass effect, or midline shift. Left basal ganglia calcification, stable. No intra- or extra-axial fluid collections identified. Basilar cisterns patent. Gray/white differentiation is well preserved.      Calvarium and visualized orbits are unremarkable. Included paranasal sinuses are clear.    IMPRESSION:    No acute intracranial abnormalities.                  BILLIE GONZALEZ M.D., ATTENDING RADIOLOGIST  This document has been electronically signed. May 18 2020  7:50PM    < end of copied text >        < from: CT Cervical Spine No Cont (05.18.20 @ 19:32) >  EXAM:  CT CERVICAL SPINE            PROCEDURE DATE:  05/18/2020            INTERPRETATION:  CT OF THE CERVICAL SPINE WITHOUT CONTRAST.    HISTORY: Trauma.    COMPARISON: None.    TECHNIQUE: Images of the cervical spine were obtained without the administration of intravenous contrast. Sagittal and coronal reformats were provided.    FINDINGS:    No acute fracture or facet subluxation. Vertebral body heights are preserved. Mild anterolisthesis at C3-C4, C4-C5, and C7-T1. Mild retrolisthesis at C5-C6 and C6-C7. Moderate to severe multilevel degenerative disc disease, most significant from C5 to T1. Severe multilevel facet and uncovertebral joint hypertrophy.    No prevertebral soft tissue edema.        IMPRESSION:    1. No acute osseous abnormality.  2. Diffuse degenerative change as above.                  BILLIE GONZALEZ M.D., ATTENDING RADIOLOGIST  This document has been electronically signed. May 18 2020  7:54PM        < end of copied text >      < from: Transthoracic Echocardiogram (05.19.20 @ 14:58) >    Summary:   1. LV Ejection Fraction by Reynolds's Method with a biplane EF of 62 %.   2. Spectral Doppler shows impaired relaxationpattern of left ventricular myocardial filling (Grade I diastolic dysfunction).   3. Mild mitral valve regurgitation.   4. Mitral annular calcification.   5. Thickening and calcification of the anterior and posterior mitral valve leaflets.   6. Aortic valve thickening and calcification with decreased leaflet opening.   7. Peak transaortic gradient equals 30.7 mmHg, mean transaortic gradient equals 14.5 mmHg, the calculated aortic valve area equals 1.90 cm² by the continuity equation consistent with mild aortic stenosis.    < end of copied text > Neurology Consult    Patient is a 64y old  Male who presents with a chief complaint of Fall (19 May 2020 08:45)    HPI:  Patient is a 64 year old gentleman with a history of Parkinson's disease (w/ mild dementia), DM2, and HTN who was BIBEMS from [facility] for multiple falls on the day of presentation. Per patient history, he had 3 falls today. The first two were early in the morning, the last one was in the evening. He states that he had been standing for a few minutes before each fall and suddenly felt lightheaded/"woozy". He fell back and hit his head all three times. He denies any preceding confusion, chest pain, shortness of breath but does not endorse feeling as if his heart was racing throughout the day. He states after the fall he was able to get back up right away with no loss of consciousness or post-fall confusion. He denies any associated urinary/fecal incontinence. He states that he has been feeling palpitations intermittently for the past few months. ROS negative for any fevers/chills, vision changes, sore throat/runny nose, chest pain, abdominal pain, diarrhea/constipation. Patient reports has recently stared walking with a walker for stability for the past few months. Per [facility], patient has been having more frequent falls over the last few days and after two falls today they decided to have him sent in for reevaluation. They have also noticed a decline in his mental status where has been more confused and forgetful recently. [place] corroborates that there is no documentation of LOC or incontinence with falls today.    Patient initially presented to [facility]. BP on arrival was 90s/50s, HR 96, with lactate of 6.8; point of care ultrasound demonstrated small pericardial effusion. He was transferred to [place] for further workup. His BP on presentation to [place] was 146/84, HR 94, lactate 2.2 after receiving 1L of LR. Trauma workup was negative except for non-displaced fracture of R lateral 7th rib. Patient was cleared by trauma service and will be admitted to medicine for further workup of recurrent falls. (18 May 2020 23:10)    PAST MEDICAL & SURGICAL HISTORY:  Mild dementia  Parkinson's disease  Diabetes 1.5, managed as type 2  No significant past surgical history      FAMILY HISTORY:  No pertinent family history in first degree relatives      Social History: (-) x 3    Allergies    No Known Allergies    Intolerances        MEDICATIONS  (STANDING):  aspirin enteric coated 81 milliGRAM(s) Oral daily  atorvastatin 20 milliGRAM(s) Oral at bedtime  carbidopa/levodopa  25/100 1 Tablet(s) Oral three times a day  famotidine  Oral Tab/Cap - Peds 20 milliGRAM(s) Oral daily  fludroCORTISONE 0.1 milliGRAM(s) Oral daily  heparin   Injectable 5000 Unit(s) SubCutaneous every 8 hours  insulin lispro (HumaLOG) corrective regimen sliding scale   SubCutaneous three times a day before meals  latanoprost 0.005% Ophthalmic Solution 1 Drop(s) Both EYES at bedtime  midodrine. 5 milliGRAM(s) Oral three times a day  QUEtiapine 25 milliGRAM(s) Oral at bedtime  senna 2 Tablet(s) Oral at bedtime  sodium chloride 0.9%. 1000 milliLiter(s) (100 mL/Hr) IV Continuous <Continuous>  timolol 0.5% Solution 1 Drop(s) Both EYES at bedtime    MEDICATIONS  (PRN):  acetaminophen   Tablet .. 650 milliGRAM(s) Oral every 6 hours PRN Mild Pain (1 - 3), Moderate Pain (4 - 6)      Review of systems:    Constitutional: as per HPI  Eyes: No eye pain or discharge  ENMT:  No difficulty hearing; No sinus or throat pain  Neck: No pain or stiffness  Respiratory: No cough, wheezing, chills or hemoptysis  Cardiovascular: No chest pain, palpitations, shortness of breath, dyspnea on exertion  Gastrointestinal: No abdominal pain, nausea, vomiting or hematemesis; No diarrhea or constipation.   Genitourinary: No dysuria, frequency, hematuria or incontinence  Neurological: As per HPI  Skin: No rashes or lesions   Endocrine: No heat or cold intolerance; No hair loss  Musculoskeletal: No joint pain or swelling  Psychiatric: No depression, anxiety, mood swings  Heme/Lymph: No easy bruising or bleeding gums    Vital Signs Last 24 Hrs  T(C): 36.5 (20 May 2020 06:00), Max: 36.5 (20 May 2020 06:00)  T(F): 97.7 (20 May 2020 06:00), Max: 97.7 (20 May 2020 06:00)  HR: 76 (20 May 2020 06:00) (76 - 112)  BP: 197/101 (20 May 2020 06:00) (136/61 - 197/101)  BP(mean): --  RR: 18 (20 May 2020 10:33) (18 - 18)  SpO2: --    Examination:  A&O x 2 (person, place)  Bradykinetic, Bradyphrenic, masked facies   Cogwheel rigidity noted in UE R>L   resting tremor noted  intact VA, VFF.  EOMI w/o nystagmus, skew or reported double vision.  PERRL.   Facial sensation normal V1 - 3, no facial asymmetry.     Hearing grossly intact b/l.  Palate elevates midline.  Tongue and uvula midline.   Muscle strength: 5/5 UE; 5/5 LE.  No observable drift.  Sensation Intact to light touch in all extremities.  Gait deferred     Labs:   CBC Full  -  ( 19 May 2020 05:37 )  WBC Count : 6.77 K/uL  RBC Count : 3.81 M/uL  Hemoglobin : 11.8 g/dL  Hematocrit : 34.3 %  Platelet Count - Automated : 209 K/uL  Mean Cell Volume : 90.0 fL  Mean Cell Hemoglobin : 31.0 pg  Mean Cell Hemoglobin Concentration : 34.4 g/dL  Auto Neutrophil # : 4.86 K/uL  Auto Lymphocyte # : 1.19 K/uL  Auto Monocyte # : 0.51 K/uL  Auto Eosinophil # : 0.14 K/uL  Auto Basophil # : 0.04 K/uL  Auto Neutrophil % : 71.8 %  Auto Lymphocyte % : 17.6 %  Auto Monocyte % : 7.5 %  Auto Eosinophil % : 2.1 %  Auto Basophil % : 0.6 %    05-20    144  |  107  |  21<H>  ----------------------------<  84  3.9   |  25  |  0.8    Ca    8.9      20 May 2020 05:41  Mg     1.8     05-20    TPro  6.8  /  Alb  4.1  /  TBili  0.6  /  DBili  x   /  AST  48<H>  /  ALT  11  /  AlkPhos  106  05-19    LIVER FUNCTIONS - ( 19 May 2020 05:37 )  Alb: 4.1 g/dL / Pro: 6.8 g/dL / ALK PHOS: 106 U/L / ALT: 11 U/L / AST: 48 U/L / GGT: x           PT/INR - ( 18 May 2020 17:00 )   PT: 15.30 sec;   INR: 1.33 ratio         PTT - ( 18 May 2020 17:00 )  PTT:25.8 sec        Orthostatics:   Lying 186/92  Sitting 91/53  Standing 87/60      Neuroimaging:  NCT:     05-20-20 @ 11:41    < from: CT Head No Cont (05.18.20 @ 19:31) >  EXAM:  CT BRAIN            PROCEDURE DATE:  05/18/2020            INTERPRETATION:  CLINICAL HISTORY: Trauma.    COMPARISON: 5/6/2020.    TECHNIQUE: Images were obtained from the skull base to the vertex without the administration of intravenous contrast. Sagittal and coronal reformatted images were also obtained.    FINDINGS:    Ventricles and sulci are age-appropriate.    No intracranial hemorrhage, mass effect, or midline shift. Left basal ganglia calcification, stable. No intra- or extra-axial fluid collections identified. Basilar cisterns patent. Gray/white differentiation is well preserved.      Calvarium and visualized orbits are unremarkable. Included paranasal sinuses are clear.    IMPRESSION:    No acute intracranial abnormalities.                  BILLIE GONZALEZ M.D., ATTENDING RADIOLOGIST  This document has been electronically signed. May 18 2020  7:50PM    < end of copied text >        < from: CT Cervical Spine No Cont (05.18.20 @ 19:32) >  EXAM:  CT CERVICAL SPINE            PROCEDURE DATE:  05/18/2020            INTERPRETATION:  CT OF THE CERVICAL SPINE WITHOUT CONTRAST.    HISTORY: Trauma.    COMPARISON: None.    TECHNIQUE: Images of the cervical spine were obtained without the administration of intravenous contrast. Sagittal and coronal reformats were provided.    FINDINGS:    No acute fracture or facet subluxation. Vertebral body heights are preserved. Mild anterolisthesis at C3-C4, C4-C5, and C7-T1. Mild retrolisthesis at C5-C6 and C6-C7. Moderate to severe multilevel degenerative disc disease, most significant from C5 to T1. Severe multilevel facet and uncovertebral joint hypertrophy.    No prevertebral soft tissue edema.        IMPRESSION:    1. No acute osseous abnormality.  2. Diffuse degenerative change as above.                  BILLIE GONZALEZ M.D., ATTENDING RADIOLOGIST  This document has been electronically signed. May 18 2020  7:54PM        < end of copied text >      < from: Transthoracic Echocardiogram (05.19.20 @ 14:58) >    Summary:   1. LV Ejection Fraction by Reynolds's Method with a biplane EF of 62 %.   2. Spectral Doppler shows impaired relaxationpattern of left ventricular myocardial filling (Grade I diastolic dysfunction).   3. Mild mitral valve regurgitation.   4. Mitral annular calcification.   5. Thickening and calcification of the anterior and posterior mitral valve leaflets.   6. Aortic valve thickening and calcification with decreased leaflet opening.   7. Peak transaortic gradient equals 30.7 mmHg, mean transaortic gradient equals 14.5 mmHg, the calculated aortic valve area equals 1.90 cm² by the continuity equation consistent with mild aortic stenosis.    < end of copied text >

## 2020-05-20 NOTE — CONSULT NOTE ADULT - ASSESSMENT
64M history of Parkinson's disease (w/ mild dementia), DM2, and HTN who was BIBEMS from [facility] for multiple falls on the day of presentation. CT head and cervical spine negative. Trauma workup revealing R lateral 7th rib fracture. Vitals significant for orthostatic hypotension.  On neurological exam patient bradykinetic, bradyphrenic with cogwheel rigidity and resting tremor.     Recommendations:   -transfer to tele for 24 hour monitoring   -cardiology evaluation for orthostatic hypotension  -increase sinemet to 25/100 mg q6   -stop fludrocortisone  -start droxidopa 100mg TID for neurogenic orthostatsis, check orthostatics 24hrs after starting   -continue compression stocking, abdominal binder   -stop Seroquel   -start Nuplazid 10mg PO qhs for agitation/hallucinations   -haldol 1 mg PRN as needed for agitation  -track CK daily       attending attestation to follow 64M history of Parkinson's disease (w/ mild dementia), DM2, and HTN who was BIBEMS from [facility] for multiple falls on the day of presentation. CT head and cervical spine negative. Trauma workup revealing R lateral 7th rib fracture. Vitals significant for orthostatic hypotension.  On neurological exam patient bradykinetic, bradyphrenic with cogwheel rigidity and resting tremor.     Recommendations:   -transfer to tele for 24 hour monitoring   -cardiology evaluation for orthostatic hypotension  -increase sinemet to 25/100 mg q6   -stop fludrocortisone, midodrine   -start droxidopa 100mg TID for neurogenic orthostatsis, check orthostatics 24hrs after starting   -continue compression stocking, abdominal binder   -stop Seroquel   -start Nuplazid 10mg PO qhs for agitation/hallucinations   -haldol 1 mg PRN as needed for agitation  -track CK daily       attending attestation to follow 64M history of Parkinson's disease (w/ mild dementia), DM2, and HTN who was BIBEMS from [facility] for multiple falls on the day of presentation. CT head and cervical spine negative. Trauma workup revealing R lateral 7th rib fracture. Vitals significant for orthostatic hypotension.  On neurological exam patient bradykinetic, bradyphrenic with cogwheel rigidity and resting tremor.      Recommendations:   -transfer to tele for 24 hour monitoring   -cardiology evaluation for orthostatic hypotension  -increase sinemet to 25/100 mg q6   -stop fludrocortisone, midodrine   -start droxidopa 100mg TID for neurogenic orthostatsis, check orthostatics 24hrs after starting   -continue compression stocking, abdominal binder   -stop Seroquel   -start Nuplazid 10mg PO qhs for agitation/hallucinations   -haldol 1 mg PRN as needed for agitation  -track CK daily       attending attestation to follow

## 2020-05-20 NOTE — CHART NOTE - NSCHARTNOTEFT_GEN_A_CORE
Patient is a 64 year old gentleman with a history of Parkinson's disease (w/ mild dementia), DM2, and HTN who was BIBEMS from [facility] for multiple falls on the day of presentation. Per patient history, he had 3 falls today. The first two were early in the morning, the last one was in the evening. He states that he had been standing for a few minutes before each fall and suddenly felt lightheaded/"woozy". He fell back and hit his head all three times. He denies any preceding confusion, chest pain, shortness of breath but does not endorse feeling as if his heart was racing throughout the day. He states after the fall he was able to get back up right away with no loss of consciousness or post-fall confusion. He denies any associated urinary/fecal incontinence. He states that he has been feeling palpitations intermittently for the past few months. ROS negative for any fevers/chills, vision changes, sore throat/runny nose, chest pain, abdominal pain, diarrhea/constipation. Patient reports has recently stared walking with a walker for stability for the past few months. Per [facility], patient has been having more frequent falls over the last few days and after two falls today they decided to have him sent in for reevaluation. They have also noticed a decline in his mental status where has been more confused and forgetful recently. [place] corroborates that there is no documentation of LOC or incontinence with falls today.    Patient initially presented to [facility], [place]. BP on arrival was 90s/50s, HR 96, with lactate of 6.8; point of care ultrasound demonstrated small pericardial effusion. He was transferred to [place] for further workup. His BP on presentation to [place] was 146/84, HR 94, lactate 2.2 after receiving 1L of LR. Trauma workup was negative except for non-displaced fracture of R lateral 7th rib. Patient was cleared by trauma service and will be admitted to medicine for further workup of recurrent falls.    seen by neurology and recommendations o upgrade to tele and cards consult made   medicines changed as per neurology     evere orthostatics (>100 mmhg drop in systolic bp upon standing) with worsening PD symptoms and mild LUBNA  -cont gentle ivf hydration (also monitor daily ck--today ck 1520)  -fall appears mechanical (patient lives at [facility] and no report of loc however in setting of significant orthostatic changes will get cardiology consult and upgrade to telemetry for monitoring  -stop seroquel (as possible cause of worsening orthostasis)  -slow position change---for now continue only bed rest  -patient came in already on florinef---stop florinef and start droxydopa---repeat orthostatics daily  -will start midodrine 5 mg q8hr  -neurology consult  -increase dose sinemet 25/100 q6hr  -fall precautions  -ECHO  -monitor electrolytes   -covid negative/ bcx negative x 2 (no underlying infectious etiology)  pharmacy to obtain nuplazid and droxydopa   fu with pharmacy     #Parkinsons Disease  dc seroquel   -neurology eval  -ct head noncontrast   -continue sinemet---increase dose 2/2 worsening symptoms  -fall precautions    #DVT/GI ppx  guarded prognosis  FULL CODE Patient is a 64 year old gentleman with a history of Parkinson's disease (w/ mild dementia), DM2, and HTN who was BIBEMS from [facility] for multiple falls on the day of presentation. Per patient history, he had 3 falls today. The first two were early in the morning, the last one was in the evening. He states that he had been standing for a few minutes before each fall and suddenly felt lightheaded/"woozy". He fell back and hit his head all three times. He denies any preceding confusion, chest pain, shortness of breath but does not endorse feeling as if his heart was racing throughout the day. He states after the fall he was able to get back up right away with no loss of consciousness or post-fall confusion. He denies any associated urinary/fecal incontinence. He states that he has been feeling palpitations intermittently for the past few months. ROS negative for any fevers/chills, vision changes, sore throat/runny nose, chest pain, abdominal pain, diarrhea/constipation. Patient reports has recently stared walking with a walker for stability for the past few months. Per [facility], patient has been having more frequent falls over the last few days and after two falls today they decided to have him sent in for reevaluation. They have also noticed a decline in his mental status where has been more confused and forgetful recently. [place] corroborates that there is no documentation of LOC or incontinence with falls today.    Patient initially presented to [facility]. BP on arrival was 90s/50s, HR 96, with lactate of 6.8; point of care ultrasound demonstrated small pericardial effusion. He was transferred to [place] for further workup. His BP on presentation to [place] was 146/84, HR 94, lactate 2.2 after receiving 1L of LR. Trauma workup was negative except for non-displaced fracture of R lateral 7th rib. Patient was cleared by trauma service and will be admitted to medicine for further workup of recurrent falls.    seen by neurology and recommendations o upgrade to tele and cards consult made   medicines changed as per neurology     evere orthostatics (>100 mmhg drop in systolic bp upon standing) with worsening PD symptoms and mild LUBNA  -cont gentle ivf hydration (also monitor daily ck--today ck 1520)  -fall appears mechanical (patient lives at [facility] and no report of loc however in setting of significant orthostatic changes will get cardiology consult and upgrade to telemetry for monitoring  -stop seroquel (as possible cause of worsening orthostasis)  -slow position change---for now continue only bed rest  -patient came in already on florinef---stop florinef and start droxydopa---repeat orthostatics daily  -will start midodrine 5 mg q8hr  -neurology consult  -increase dose sinemet 25/100 q6hr  -fall precautions  -ECHO  -monitor electrolytes   -covid negative/ bcx negative x 2 (no underlying infectious etiology)  pharmacy to obtain nuplazid and droxydopa   fu with pharmacy   fu 1600 ck   trend daily     #Parkinsons Disease  dc seroquel   -neurology eval  -ct head noncontrast   -continue sinemet---increase dose 2/2 worsening symptoms  -fall precautions    #DVT/GI ppx  guarded prognosis  FULL CODE

## 2020-05-20 NOTE — PROGRESS NOTE ADULT - SUBJECTIVE AND OBJECTIVE BOX
Patient is a 64y old  Male who presents with a chief complaint of Fall (20 May 2020 11:40)    HPI:  Patient is a 64 year old gentleman with a history of Parkinson's disease (w/ mild dementia), DM2, and HTN who was BIBEMS from [facility] for multiple falls on the day of presentation. Per patient history, he had 3 falls today. The first two were early in the morning, the last one was in the evening. He states that he had been standing for a few minutes before each fall and suddenly felt lightheaded/"woozy". He fell back and hit his head all three times. He denies any preceding confusion, chest pain, shortness of breath but does not endorse feeling as if his heart was racing throughout the day. He states after the fall he was able to get back up right away with no loss of consciousness or post-fall confusion. He denies any associated urinary/fecal incontinence. He states that he has been feeling palpitations intermittently for the past few months. ROS negative for any fevers/chills, vision changes, sore throat/runny nose, chest pain, abdominal pain, diarrhea/constipation. Patient reports has recently stared walking with a walker for stability for the past few months. Per [facility], patient has been having more frequent falls over the last few days and after two falls today they decided to have him sent in for reevaluation. They have also noticed a decline in his mental status where has been more confused and forgetful recently. [place] corroborates that there is no documentation of LOC or incontinence with falls today.    Patient initially presented to [facility]. BP on arrival was 90s/50s, HR 96, with lactate of 6.8; point of care ultrasound demonstrated small pericardial effusion. He was transferred to [place] for further workup. His BP on presentation to [place] was 146/84, HR 94, lactate 2.2 after receiving 1L of LR. Trauma workup was negative except for non-displaced fracture of R lateral 7th rib. Patient was cleared by trauma service and will be admitted to medicine for further workup of recurrent falls. (18 May 2020 23:10)    PAST MEDICAL & SURGICAL HISTORY:  Mild dementia  Parkinson's disease  Diabetes 1.5, managed as type 2  No significant past surgical history    patient seen and examined independently on morning rounds for the first time today.    overnight events noted including agitation and ? hallucinations- also patient with severe symptomatic orthostasis today (186/90--->90/60 upon standing)- patient tearful during our discussion     Vital Signs Last 24 Hrs  T(C): 36.5 (20 May 2020 06:00), Max: 36.5 (20 May 2020 06:00)  T(F): 97.7 (20 May 2020 06:00), Max: 97.7 (20 May 2020 06:00)  HR: 76 (20 May 2020 06:00) (76 - 112)  BP: 197/101 (20 May 2020 06:00) (136/61 - 197/101)  BP(mean): --  RR: 18 (20 May 2020 10:33) (18 - 18)  SpO2: --             PE:  GEN-NAD, AAOx2, +resting tremor, +cogwheel rigidity  PULM- Clear to auscultation bilaterally, fair air entry  CVS- +s1/s2 RRR no murmurs  GI- soft NT obese ND +bs, no rebound, no guarding  EXT- motor 5/5 bilateral ue and le, +resting tremor               11.8   6.77  )-----------( 209      ( 19 May 2020 05:37 )             34.3     05-20    144  |  107  |  21<H>  ----------------------------<  84  3.9   |  25  |  0.8    Ca    8.9      20 May 2020 05:41  Mg     1.8     05-20    TPro  6.8  /  Alb  4.1  /  TBili  0.6  /  DBili  x   /  AST  48<H>  /  ALT  11  /  AlkPhos  106  05-19    CARDIAC MARKERS ( 19 May 2020 11:09 )  x     / x     / 1520 U/L / x     / x      CARDIAC MARKERS ( 19 May 2020 05:37 )  x     / 0.01 ng/mL / 1783 U/L / x     / 18.9 ng/mL  CARDIAC MARKERS ( 19 May 2020 00:54 )  x     / 0.01 ng/mL / 1837 U/L / x     / 22.3 ng/mL  CARDIAC MARKERS ( 18 May 2020 17:00 )  x     / 0.02 ng/mL / x     / x     / x              Culture - Blood (collected 18 May 2020 17:05)  Source: .Blood Blood-Peripheral  Preliminary Report (20 May 2020 02:02):    No growth to date.    Culture - Blood (collected 18 May 2020 17:00)  Source: .Blood Blood-Peripheral  Preliminary Report (20 May 2020 02:02):    No growth to date.        MEDICATIONS  (STANDING):  aspirin enteric coated 81 milliGRAM(s) Oral daily  atorvastatin 20 milliGRAM(s) Oral at bedtime  carbidopa/levodopa  25/100 1 Tablet(s) Oral three times a day  famotidine  Oral Tab/Cap - Peds 20 milliGRAM(s) Oral daily  fludroCORTISONE 0.1 milliGRAM(s) Oral daily  heparin   Injectable 5000 Unit(s) SubCutaneous every 8 hours  insulin lispro (HumaLOG) corrective regimen sliding scale   SubCutaneous three times a day before meals  latanoprost 0.005% Ophthalmic Solution 1 Drop(s) Both EYES at bedtime  midodrine. 5 milliGRAM(s) Oral three times a day  QUEtiapine 25 milliGRAM(s) Oral at bedtime  senna 2 Tablet(s) Oral at bedtime  sodium chloride 0.9%. 1000 milliLiter(s) (100 mL/Hr) IV Continuous <Continuous>  timolol 0.5% Solution 1 Drop(s) Both EYES at bedtime

## 2020-05-20 NOTE — CONSULT NOTE ADULT - ASSESSMENT
Assessment:  Severe orthostatic hypotension  Underlying Parkinson's disease  Clinical presentation and fall likely 2/2 orthostasis worsened bby dehydration    Plan:  medication actively managed by neurology  check orthostatic BP daily Assessment:  Severe orthostatic hypotension due to dysautonomia 2/2 underlying Parkinson's disease  Clinical presentation and fall likely 2/2 orthostasis worsened by dehydration    Plan:  medication actively managed by neurology, pt is still orthostatic  check orthostatic BP daily  reassess BP after addition of droxidopa Assessment:  Severe orthostatic hypotension due to dysautonomia 2/2 underlying Parkinson's disease  Clinical presentation and fall likely 2/2 orthostasis worsened by dehydration    Plan:  medication actively managed by neurology, pt is still orthostatic  check orthostatic BP daily  reassess BP after addition of droxidopa as per neurology recommendation and adequate hydration Assessment:  Severe orthostatic hypotension due to dysautonomia 2/2 underlying Parkinson's disease  Clinical presentation and fall likely 2/2 orthostasis worsened by dehydration    Plan:  medication actively managed by neurology, pt is still orthostatic  check orthostatic BP daily  reassess BP after addition of droxidopa as per neurology recommendation and adequate hydration  c/w compression stocking and abdominal binder Assessment:  Severe orthostatic hypotension due to dysautonomia 2/2 underlying Parkinson's disease  Clinical presentation and fall likely 2/2 orthostasis worsened by dehydration  Mild valvular heart disease, euvolemic in exam  CAD s/p PCI+stentx1 as per pt, many years ago    Plan:  check orthostatic BP daily  reassess orthostasis and BP after addition of droxidopa as per neurology recommendation and adequate hydration  c/w compression stocking and abdominal binder   c/w aspirin and statin Assessment:  Severe orthostatic hypotension likely due to dysautonomia 2/2 underlying Parkinson's disease, previously on fludrocortisone  Clinical presentation and fall likely 2/2 orthostasis worsened by dehydration  HFpEF, mild valvular heart disease, euvolemic to dry in exam  CAD s/p PCI+stentx1 as per pt, many years ago    Plan:  check orthostatic BP daily  reassess orthostasis and BP after addition of droxidopa as per neurology recommendation and adequate hydration  c/w compression stocking and abdominal binder   c/w aspirin and statin  Cardiology input limited at this time as primary mechanism of orthostasis is likely dysautonomnia

## 2020-05-20 NOTE — CONSULT NOTE ADULT - SUBJECTIVE AND OBJECTIVE BOX
Date of Admission: 05-18-20    CHIEF COMPLAINT: Patient is a 64y old  Male who presents with a chief complaint of Fall (20 May 2020 12:14)      HPI:  Patient is a 64 year old gentleman with a history of Parkinson's disease (w/ mild dementia), DM2, and HTN who was BIBEMS from [facility] for multiple falls on the day of presentation. Per patient history, he had 3 falls today. The first two were early in the morning, the last one was in the evening. He states that he had been standing for a few minutes before each fall and suddenly felt lightheaded/"woozy". He fell back and hit his head all three times. He denies any preceding confusion, chest pain, shortness of breath but does not endorse feeling as if his heart was racing throughout the day. He states after the fall he was able to get back up right away with no loss of consciousness or post-fall confusion. He denies any associated urinary/fecal incontinence. He states that he has been feeling palpitations intermittently for the past few months. ROS negative for any fevers/chills, vision changes, sore throat/runny nose, chest pain, abdominal pain, diarrhea/constipation. Patient reports has recently stared walking with a walker for stability for the past few months. Per [facility], patient has been having more frequent falls over the last few days and after two falls today they decided to have him sent in for reevaluation. They have also noticed a decline in his mental status where has been more confused and forgetful recently. [place] corroborates that there is no documentation of LOC or incontinence with falls today.    Patient initially presented to [facility]. BP on arrival was 90s/50s, HR 96, with lactate of 6.8; point of care ultrasound demonstrated small pericardial effusion. He was transferred to [place] for further workup. His BP on presentation to [place] was 146/84, HR 94, lactate 2.2 after receiving 1L of LR. Trauma workup was negative except for non-displaced fracture of R lateral 7th rib. Patient was cleared by trauma service and will be admitted to medicine for further workup of recurrent falls. (18 May 2020 23:10)      PAST MEDICAL & SURGICAL HISTORY:  Mild dementia  Parkinson's disease  Diabetes 1.5, managed as type 2  No significant past surgical history      FAMILY HISTORY:  No pertinent family history in first degree relatives  [x] no pertinent family history of premature cardiovascular disease in first degree relatives.    SOCIAL HISTORY:    [x] Active smoker 1-2 ppd  [x] Drinks beer a few times per week  [x] No illicit drug use    Allergies: No Known Allergies    REVIEW OF SYSTEMS:  CONSTITUTIONAL: No fever, weight loss, or fatigue  CARDIOLOGY: No chest pain, dyspnea of exertion, or syncopal episodes.   RESPIRATORY: No shortness of breath, cough, wheezing.   NEUROLOGICAL: No weakness, no focal deficits to report. (+) Lightheadedness  GI: No BRBPR, no N,V,diarrhea.    PSYCHIATRY: Normal mood and affect.  HEENT: No nasal discharge, no ecchymosis  SKIN: No ecchymosis, no breakdown  MUSCULOSKELETAL: Full range of motion x4. (+) Fall  EXTREM: No leg swelling or erythema.    PHYSICAL EXAM:  T(C): 36.5 (05-20-20 @ 06:00), Max: 36.5 (05-20-20 @ 06:00)  HR: 76 (05-20-20 @ 06:00) (76 - 96)  BP: 197/101 (05-20-20 @ 06:00) (141/68 - 197/101)  RR: 18 (05-20-20 @ 10:33) (18 - 18)  SpO2: --  Wt(kg): --  I&O's Summary    19 May 2020 07:01  -  20 May 2020 07:00  --------------------------------------------------------  IN: 275 mL / OUT: 950 mL / NET: -675 mL    20 May 2020 07:01  -  20 May 2020 14:22  --------------------------------------------------------  IN: 431 mL / OUT: 0 mL / NET: 431 mL        General Appearance: NAD, normal for age and gender. 	  Neck: Normal JVP, no bruit.   Eyes: No xanthomalasia, Extra Ocular muscles intact.   Cardiovascular: Regular rate and rhythm S1 S2, No JVD, No murmurs.  Respiratory: Lungs clear to auscultation. No wheezes, rales or rhonchi.  Psychiatry: Alert and oriented x 3, Mood & affect appropriate  Gastrointestinal:  Soft, Non-tender  Skin/Integumen: No rashes, No ecchymoses, No cyanosis	  Neurologic: Non-focal deficits.  Musculoskeletal/ extremities: Normal range of motion, No clubbing, cyanosis or edema  Vascular: Peripheral pulses palpable 2+ bilaterally    LABS:	 	                        11.8   6.77  )-----------( 209      ( 19 May 2020 05:37 )             34.3     05-20    144  |  107  |  21<H>  ----------------------------<  84  3.9   |  25  |  0.8    Ca    8.9      20 May 2020 05:41  Mg     1.8     05-20    TPro  6.8  /  Alb  4.1  /  TBili  0.6  /  DBili  x   /  AST  48<H>  /  ALT  11  /  AlkPhos  106  05-19    CARDIAC MARKERS ( 19 May 2020 11:09 )  x     / x     / 1520 U/L / x     / x      CARDIAC MARKERS ( 19 May 2020 05:37 )  x     / 0.01 ng/mL / 1783 U/L / x     / 18.9 ng/mL  CARDIAC MARKERS ( 19 May 2020 00:54 )  x     / 0.01 ng/mL / 1837 U/L / x     / 22.3 ng/mL  CARDIAC MARKERS ( 18 May 2020 17:00 )  x     / 0.02 ng/mL / x     / x     / x          PT/INR - ( 18 May 2020 17:00 )   PT: 15.30 sec;   INR: 1.33 ratio    PTT - ( 18 May 2020 17:00 )  PTT:25.8 sec      TELEMETRY EVENTS: 	    ECG: < from: 12 Lead ECG (05.18.20 @ 16:44) >  Diagnosis Line Normal sinus rhythm  Possible Septal infarct , age undetermined  Abnormal ECG     	  RADIOLOGY: < from: CT Angio Chest Dissection Protocol (05.18.20 @ 19:33) >  IMPRESSION: No evidence of aortic aneurysm or dissection.    There is mild deformity of the right lateral seventh rib compatible with a nondisplaced fracture.     < from: CT Head No Cont (05.18.20 @ 19:31) >  No acute intracranial abnormalities.    < from: CT Cervical Spine No Cont (05.18.20 @ 19:32) >  1. No acute osseous abnormality.  2. Diffuse degenerative change as above.    OTHER: 	    PREVIOUS DIAGNOSTIC TESTING:    [x] Echocardiogram: < from: Transthoracic Echocardiogram (05.19.20 @ 14:58) >   1. LV Ejection Fraction by Reynolds's Method with a biplane EF of 62 %.   2. Spectral Doppler shows impaired relaxationpattern of left ventricular myocardial filling (Grade I diastolic dysfunction).   3. Mild mitral valve regurgitation.   4. Mitral annular calcification.   5. Thickening and calcification of the anterior and posterior mitral valve leaflets.   6. Aortic valve thickening and calcification with decreased leaflet opening.   7. Peak transaortic gradient equals 30.7 mmHg, mean transaortic gradient equals 14.5 mmHg, the calculated aortic valve area equals 1.90 cm² by the continuity equation consistent with mild aortic stenosis.    Home Medications:  Aspir 81 oral delayed release tablet: 1 tab(s) orally once a day (18 May 2020 17:05)  atorvastatin 20 mg oral tablet: 1 tab(s) orally once a day (18 May 2020 17:05)  carbidopa-levodopa 25 mg-100 mg oral tablet: 1 tab(s) orally 3 times a day (18 May 2020 17:05)  docusate sodium 100 mg oral tablet:  (18 May 2020 17:05)  famotidine 20 mg oral tablet: orally once a day (18 May 2020 17:05)  fludrocortisone 0.1 mg oral tablet: 1 tab(s) orally once a day (18 May 2020 17:05)  glipiZIDE 5 mg oral tablet: 1 tab(s) orally once a day (18 May 2020 17:05)  haloperidol 1 mg oral tablet:  (18 May 2020 17:05)  Lutein 20 mg oral capsule: 1 cap(s) orally once a day (18 May 2020 17:05)  metFORMIN 1000 mg oral tablet: 1 tab(s) orally 2 times a day (18 May 2020 17:05)  PreserVision AREDS 2 oral capsule: orally 2 times a day (18 May 2020 17:05)  QUEtiapine 25 mg oral tablet: orally once a day (at bedtime) (18 May 2020 17:05)  SEROquel 25 mg oral tablet: 2 tab(s) orally , As Needed (18 May 2020 17:05)  timolol-latanoprost 0.5%-0.005% ophthalmic solution: to each affected eye once a day (at bedtime) (18 May 2020 17:05)    MEDICATIONS  (STANDING):  aspirin enteric coated 81 milliGRAM(s) Oral daily  atorvastatin 20 milliGRAM(s) Oral at bedtime  carbidopa/levodopa  25/100 1 Tablet(s) Oral every 6 hours  droxidopa 100 milliGRAM(s) Oral three times a day  famotidine  Oral Tab/Cap - Peds 20 milliGRAM(s) Oral daily  heparin   Injectable 5000 Unit(s) SubCutaneous every 8 hours  insulin lispro (HumaLOG) corrective regimen sliding scale   SubCutaneous three times a day before meals  latanoprost 0.005% Ophthalmic Solution 1 Drop(s) Both EYES at bedtime  midodrine. 5 milliGRAM(s) Oral three times a day  senna 2 Tablet(s) Oral at bedtime  sodium chloride 0.9%. 1000 milliLiter(s) (100 mL/Hr) IV Continuous <Continuous>  timolol 0.5% Solution 1 Drop(s) Both EYES at bedtime    MEDICATIONS  (PRN):  acetaminophen   Tablet .. 650 milliGRAM(s) Oral every 6 hours PRN Mild Pain (1 - 3), Moderate Pain (4 - 6)  haloperidol     Tablet 1 milliGRAM(s) Oral every 6 hours PRN agitation Date of Admission: 05-18-20    CHIEF COMPLAINT: Patient is a 64y old  Male who presents with a chief complaint of Fall (20 May 2020 12:14)      HPI:  Patient is a 64 year old gentleman with a history of Parkinson's disease (w/ mild dementia), DM2, and HTN who was BIBEMS from [facility] for multiple falls on the day of presentation. Per patient history, he had 3 falls today. The first two were early in the morning, the last one was in the evening. He states that he had been standing for a few minutes before each fall and suddenly felt lightheaded/"woozy". He fell back and hit his head all three times. He denies any preceding confusion, chest pain, shortness of breath but does not endorse feeling as if his heart was racing throughout the day. He states after the fall he was able to get back up right away with no loss of consciousness or post-fall confusion. He denies any associated urinary/fecal incontinence. He states that he has been feeling palpitations intermittently for the past few months. ROS negative for any fevers/chills, vision changes, sore throat/runny nose, chest pain, abdominal pain, diarrhea/constipation. Patient reports has recently stared walking with a walker for stability for the past few months. Per [facility], patient has been having more frequent falls over the last few days and after two falls today they decided to have him sent in for reevaluation. They have also noticed a decline in his mental status where has been more confused and forgetful recently. [place] corroborates that there is no documentation of LOC or incontinence with falls today.    Patient initially presented to [facility]. BP on arrival was 90s/50s, HR 96, with lactate of 6.8; point of care ultrasound demonstrated small pericardial effusion. He was transferred to [place] for further workup. His BP on presentation to [place] was 146/84, HR 94, lactate 2.2 after receiving 1L of LR. Trauma workup was negative except for non-displaced fracture of R lateral 7th rib. Patient was cleared by trauma service and will be admitted to medicine for further workup of recurrent falls. (18 May 2020 23:10)      PAST MEDICAL & SURGICAL HISTORY:  Mild dementia  Parkinson's disease  Diabetes 1.5, managed as type 2  No significant past surgical history      FAMILY HISTORY:  No pertinent family history in first degree relatives    SOCIAL HISTORY:    [x] Ex-smoker  [x] No ETOH use  [x] No illicit drug use    Allergies: No Known Allergies    REVIEW OF SYSTEMS:  CONSTITUTIONAL: No fever, weight loss, or fatigue  CARDIOLOGY: No chest pain, dyspnea of exertion, or syncopal episodes.   RESPIRATORY: No shortness of breath, cough, wheezing.   NEUROLOGICAL: No weakness, no focal deficits to report. (+) Lightheadedness  GI: No BRBPR, no N,V,diarrhea.    PSYCHIATRY: Normal mood and affect.  HEENT: No nasal discharge, no ecchymosis  SKIN: No ecchymosis, no breakdown  MUSCULOSKELETAL: Full range of motion x4. (+) Fall  EXTREM: No leg swelling or erythema.    PHYSICAL EXAM:  T(C): 36.5 (05-20-20 @ 06:00), Max: 36.5 (05-20-20 @ 06:00)  HR: 76 (05-20-20 @ 06:00) (76 - 96)  BP: 197/101 (05-20-20 @ 06:00) (141/68 - 197/101)  RR: 18 (05-20-20 @ 10:33) (18 - 18)  SpO2: --  Wt(kg): --  I&O's Summary    19 May 2020 07:01  -  20 May 2020 07:00  --------------------------------------------------------  IN: 275 mL / OUT: 950 mL / NET: -675 mL    20 May 2020 07:01  -  20 May 2020 14:22  --------------------------------------------------------  IN: 431 mL / OUT: 0 mL / NET: 431 mL        General Appearance: NAD, normal for age and gender. 	  Neck: Normal JVP, no bruit.   Eyes: No xanthomalasia, Conjunctiva clear.   Cardiovascular: Regular rate and rhythm S1 S2, No JVD. Systolic murmur.  Respiratory: Lungs clear to auscultation. No wheezes, rales or rhonchi.  Psychiatry: Alert and oriented x 3, Mood & affect appropriate  Gastrointestinal:  Soft, Non-tender  Skin/Integumen: No rashes, No ecchymoses, No cyanosis	  Neurologic: Non-focal deficits.  Musculoskeletal/ extremities: Move extremities. No clubbing, cyanosis or edema  Vascular: Peripheral pulses palpable bilaterally    LABS:	 	                        11.8   6.77  )-----------( 209      ( 19 May 2020 05:37 )             34.3     05-20    144  |  107  |  21<H>  ----------------------------<  84  3.9   |  25  |  0.8    Ca    8.9      20 May 2020 05:41  Mg     1.8     05-20    TPro  6.8  /  Alb  4.1  /  TBili  0.6  /  DBili  x   /  AST  48<H>  /  ALT  11  /  AlkPhos  106  05-19    CARDIAC MARKERS ( 19 May 2020 11:09 )  x     / x     / 1520 U/L / x     / x      CARDIAC MARKERS ( 19 May 2020 05:37 )  x     / 0.01 ng/mL / 1783 U/L / x     / 18.9 ng/mL  CARDIAC MARKERS ( 19 May 2020 00:54 )  x     / 0.01 ng/mL / 1837 U/L / x     / 22.3 ng/mL  CARDIAC MARKERS ( 18 May 2020 17:00 )  x     / 0.02 ng/mL / x     / x     / x          PT/INR - ( 18 May 2020 17:00 )   PT: 15.30 sec;   INR: 1.33 ratio    PTT - ( 18 May 2020 17:00 )  PTT:25.8 sec      TELEMETRY EVENTS: 	    ECG: < from: 12 Lead ECG (05.18.20 @ 16:44) >  Diagnosis Line Normal sinus rhythm  Possible Septal infarct , age undetermined  Abnormal ECG     	  RADIOLOGY: < from: CT Angio Chest Dissection Protocol (05.18.20 @ 19:33) >  IMPRESSION: No evidence of aortic aneurysm or dissection.    There is mild deformity of the right lateral seventh rib compatible with a nondisplaced fracture.     < from: CT Head No Cont (05.18.20 @ 19:31) >  No acute intracranial abnormalities.    < from: CT Cervical Spine No Cont (05.18.20 @ 19:32) >  1. No acute osseous abnormality.  2. Diffuse degenerative change as above.    OTHER: 	    PREVIOUS DIAGNOSTIC TESTING:    [x] Echocardiogram: < from: Transthoracic Echocardiogram (05.19.20 @ 14:58) >   1. LV Ejection Fraction by Reynolds's Method with a biplane EF of 62 %.   2. Spectral Doppler shows impaired relaxation pattern of left ventricular myocardial filling (Grade I diastolic dysfunction).   3. Mild mitral valve regurgitation.   4. Mitral annular calcification.   5. Thickening and calcification of the anterior and posterior mitral valve leaflets.   6. Aortic valve thickening and calcification with decreased leaflet opening.   7. Peak transaortic gradient equals 30.7 mmHg, mean transaortic gradient equals 14.5 mmHg, the calculated aortic valve area equals 1.90 cm² by the continuity equation consistent with mild aortic stenosis.    Home Medications:  Aspir 81 oral delayed release tablet: 1 tab(s) orally once a day (18 May 2020 17:05)  atorvastatin 20 mg oral tablet: 1 tab(s) orally once a day (18 May 2020 17:05)  carbidopa-levodopa 25 mg-100 mg oral tablet: 1 tab(s) orally 3 times a day (18 May 2020 17:05)  docusate sodium 100 mg oral tablet:  (18 May 2020 17:05)  famotidine 20 mg oral tablet: orally once a day (18 May 2020 17:05)  fludrocortisone 0.1 mg oral tablet: 1 tab(s) orally once a day (18 May 2020 17:05)  glipiZIDE 5 mg oral tablet: 1 tab(s) orally once a day (18 May 2020 17:05)  haloperidol 1 mg oral tablet:  (18 May 2020 17:05)  Lutein 20 mg oral capsule: 1 cap(s) orally once a day (18 May 2020 17:05)  metFORMIN 1000 mg oral tablet: 1 tab(s) orally 2 times a day (18 May 2020 17:05)  PreserVision AREDS 2 oral capsule: orally 2 times a day (18 May 2020 17:05)  QUEtiapine 25 mg oral tablet: orally once a day (at bedtime) (18 May 2020 17:05)  SEROquel 25 mg oral tablet: 2 tab(s) orally , As Needed (18 May 2020 17:05)  timolol-latanoprost 0.5%-0.005% ophthalmic solution: to each affected eye once a day (at bedtime) (18 May 2020 17:05)    MEDICATIONS  (STANDING):  aspirin enteric coated 81 milliGRAM(s) Oral daily  atorvastatin 20 milliGRAM(s) Oral at bedtime  carbidopa/levodopa  25/100 1 Tablet(s) Oral every 6 hours  droxidopa 100 milliGRAM(s) Oral three times a day  famotidine  Oral Tab/Cap - Peds 20 milliGRAM(s) Oral daily  heparin   Injectable 5000 Unit(s) SubCutaneous every 8 hours  insulin lispro (HumaLOG) corrective regimen sliding scale   SubCutaneous three times a day before meals  latanoprost 0.005% Ophthalmic Solution 1 Drop(s) Both EYES at bedtime  midodrine. 5 milliGRAM(s) Oral three times a day  senna 2 Tablet(s) Oral at bedtime  sodium chloride 0.9%. 1000 milliLiter(s) (100 mL/Hr) IV Continuous <Continuous>  timolol 0.5% Solution 1 Drop(s) Both EYES at bedtime    MEDICATIONS  (PRN):  acetaminophen   Tablet .. 650 milliGRAM(s) Oral every 6 hours PRN Mild Pain (1 - 3), Moderate Pain (4 - 6)  haloperidol     Tablet 1 milliGRAM(s) Oral every 6 hours PRN agitation

## 2020-05-21 LAB
ALBUMIN SERPL ELPH-MCNC: 3.9 G/DL — SIGNIFICANT CHANGE UP (ref 3.5–5.2)
ALP SERPL-CCNC: 97 U/L — SIGNIFICANT CHANGE UP (ref 30–115)
ALT FLD-CCNC: <5 U/L — SIGNIFICANT CHANGE UP (ref 0–41)
ANION GAP SERPL CALC-SCNC: 12 MMOL/L — SIGNIFICANT CHANGE UP (ref 7–14)
AST SERPL-CCNC: 24 U/L — SIGNIFICANT CHANGE UP (ref 0–41)
BILIRUB SERPL-MCNC: 0.5 MG/DL — SIGNIFICANT CHANGE UP (ref 0.2–1.2)
BUN SERPL-MCNC: 18 MG/DL — SIGNIFICANT CHANGE UP (ref 10–20)
CALCIUM SERPL-MCNC: 9.2 MG/DL — SIGNIFICANT CHANGE UP (ref 8.5–10.1)
CHLORIDE SERPL-SCNC: 106 MMOL/L — SIGNIFICANT CHANGE UP (ref 98–110)
CK SERPL-CCNC: 351 U/L — HIGH (ref 0–225)
CO2 SERPL-SCNC: 28 MMOL/L — SIGNIFICANT CHANGE UP (ref 17–32)
CREAT SERPL-MCNC: 1 MG/DL — SIGNIFICANT CHANGE UP (ref 0.7–1.5)
GLUCOSE BLDC GLUCOMTR-MCNC: 106 MG/DL — HIGH (ref 70–99)
GLUCOSE BLDC GLUCOMTR-MCNC: 109 MG/DL — HIGH (ref 70–99)
GLUCOSE BLDC GLUCOMTR-MCNC: 81 MG/DL — SIGNIFICANT CHANGE UP (ref 70–99)
GLUCOSE BLDC GLUCOMTR-MCNC: 98 MG/DL — SIGNIFICANT CHANGE UP (ref 70–99)
GLUCOSE SERPL-MCNC: 79 MG/DL — SIGNIFICANT CHANGE UP (ref 70–99)
HCT VFR BLD CALC: 32.9 % — LOW (ref 42–52)
HGB BLD-MCNC: 11.9 G/DL — LOW (ref 14–18)
MCHC RBC-ENTMCNC: 32.7 PG — HIGH (ref 27–31)
MCHC RBC-ENTMCNC: 36.2 G/DL — SIGNIFICANT CHANGE UP (ref 32–37)
MCV RBC AUTO: 90.4 FL — SIGNIFICANT CHANGE UP (ref 80–94)
NRBC # BLD: 0 /100 WBCS — SIGNIFICANT CHANGE UP (ref 0–0)
PLATELET # BLD AUTO: 212 K/UL — SIGNIFICANT CHANGE UP (ref 130–400)
POTASSIUM SERPL-MCNC: 4.1 MMOL/L — SIGNIFICANT CHANGE UP (ref 3.5–5)
POTASSIUM SERPL-SCNC: 4.1 MMOL/L — SIGNIFICANT CHANGE UP (ref 3.5–5)
PROT SERPL-MCNC: 6.5 G/DL — SIGNIFICANT CHANGE UP (ref 6–8)
RBC # BLD: 3.64 M/UL — LOW (ref 4.7–6.1)
RBC # FLD: 12.3 % — SIGNIFICANT CHANGE UP (ref 11.5–14.5)
SODIUM SERPL-SCNC: 146 MMOL/L — SIGNIFICANT CHANGE UP (ref 135–146)
WBC # BLD: 5.53 K/UL — SIGNIFICANT CHANGE UP (ref 4.8–10.8)
WBC # FLD AUTO: 5.53 K/UL — SIGNIFICANT CHANGE UP (ref 4.8–10.8)

## 2020-05-21 PROCEDURE — 99233 SBSQ HOSP IP/OBS HIGH 50: CPT

## 2020-05-21 PROCEDURE — 99497 ADVNCD CARE PLAN 30 MIN: CPT

## 2020-05-21 RX ADMIN — HEPARIN SODIUM 5000 UNIT(S): 5000 INJECTION INTRAVENOUS; SUBCUTANEOUS at 05:28

## 2020-05-21 RX ADMIN — FAMOTIDINE 20 MILLIGRAM(S): 10 INJECTION INTRAVENOUS at 11:58

## 2020-05-21 RX ADMIN — LATANOPROST 1 DROP(S): 0.05 SOLUTION/ DROPS OPHTHALMIC; TOPICAL at 21:25

## 2020-05-21 RX ADMIN — SENNA PLUS 2 TABLET(S): 8.6 TABLET ORAL at 21:25

## 2020-05-21 RX ADMIN — HEPARIN SODIUM 5000 UNIT(S): 5000 INJECTION INTRAVENOUS; SUBCUTANEOUS at 21:25

## 2020-05-21 RX ADMIN — ATORVASTATIN CALCIUM 20 MILLIGRAM(S): 80 TABLET, FILM COATED ORAL at 21:25

## 2020-05-21 RX ADMIN — CARBIDOPA AND LEVODOPA 1 TABLET(S): 25; 100 TABLET ORAL at 23:40

## 2020-05-21 RX ADMIN — DROXIDOPA 100 MILLIGRAM(S): 100 CAPSULE ORAL at 05:29

## 2020-05-21 RX ADMIN — CARBIDOPA AND LEVODOPA 1 TABLET(S): 25; 100 TABLET ORAL at 17:23

## 2020-05-21 RX ADMIN — Medication 81 MILLIGRAM(S): at 11:59

## 2020-05-21 RX ADMIN — CARBIDOPA AND LEVODOPA 1 TABLET(S): 25; 100 TABLET ORAL at 11:59

## 2020-05-21 RX ADMIN — DROXIDOPA 100 MILLIGRAM(S): 100 CAPSULE ORAL at 17:23

## 2020-05-21 RX ADMIN — Medication 1 DROP(S): at 21:25

## 2020-05-21 RX ADMIN — MIDODRINE HYDROCHLORIDE 5 MILLIGRAM(S): 2.5 TABLET ORAL at 05:29

## 2020-05-21 RX ADMIN — DROXIDOPA 100 MILLIGRAM(S): 100 CAPSULE ORAL at 11:59

## 2020-05-21 RX ADMIN — CARBIDOPA AND LEVODOPA 1 TABLET(S): 25; 100 TABLET ORAL at 05:29

## 2020-05-21 RX ADMIN — HEPARIN SODIUM 5000 UNIT(S): 5000 INJECTION INTRAVENOUS; SUBCUTANEOUS at 13:33

## 2020-05-21 NOTE — PROGRESS NOTE ADULT - SUBJECTIVE AND OBJECTIVE BOX
Patient is a 64y old  Male who presents with a chief complaint of Fall (20 May 2020 11:40)    HPI:  Patient is a 64 year old gentleman with a history of Parkinson's disease (w/ mild dementia), DM2, and HTN who was BIBEMS from [facility] for multiple falls on the day of presentation. Per patient history, he had 3 falls today. The first two were early in the morning, the last one was in the evening. He states that he had been standing for a few minutes before each fall and suddenly felt lightheaded/"woozy". He fell back and hit his head all three times. He denies any preceding confusion, chest pain, shortness of breath but does not endorse feeling as if his heart was racing throughout the day. He states after the fall he was able to get back up right away with no loss of consciousness or post-fall confusion. He denies any associated urinary/fecal incontinence. He states that he has been feeling palpitations intermittently for the past few months. ROS negative for any fevers/chills, vision changes, sore throat/runny nose, chest pain, abdominal pain, diarrhea/constipation. Patient reports has recently stared walking with a walker for stability for the past few months. Per [facility], patient has been having more frequent falls over the last few days and after two falls today they decided to have him sent in for reevaluation. They have also noticed a decline in his mental status where has been more confused and forgetful recently. [place] corroborates that there is no documentation of LOC or incontinence with falls today.    Patient initially presented to [facility]. BP on arrival was 90s/50s, HR 96, with lactate of 6.8; point of care ultrasound demonstrated small pericardial effusion. He was transferred to [place] for further workup. His BP on presentation to [place] was 146/84, HR 94, lactate 2.2 after receiving 1L of LR. Trauma workup was negative except for non-displaced fracture of R lateral 7th rib. Patient was cleared by trauma service and will be admitted to medicine for further workup of recurrent falls. (18 May 2020 23:10)    PAST MEDICAL & SURGICAL HISTORY:  Mild dementia  Parkinson's disease  Diabetes 1.5, managed as type 2  No significant past surgical history    patient seen and examined independently on morning rounds    patient transferred to telemetry for monitoring after found to have severe orthostatic hypotension- also d/w paitents daughter Mandi at length ([phone]) including goc- will speak with brother and read through living will- at this time patient to remain full code- also notes gradual progression and decline over last 1 year with significant worsening of  symptoms and confusion/ams over last 2 weeks    Vital Signs Last 24 Hrs  T(C): 36.5 (20 May 2020 06:00), Max: 36.5 (20 May 2020 06:00)  T(F): 97.7 (20 May 2020 06:00), Max: 97.7 (20 May 2020 06:00)  HR: 76 (20 May 2020 06:00) (76 - 112)  BP: 197/101 (20 May 2020 06:00) (136/61 - 197/101)  BP(mean): --  RR: 18 (20 May 2020 10:33) (18 - 18)  SpO2: --             PE:  GEN-NAD, AAOx2, +mild tremor, +cogwheel rigidity- patient tells me he is not sure if i am real or just make believe  PULM- Clear to auscultation bilaterally, fair air entry  CVS- +s1/s2 RRR no murmurs  GI- soft NT obese ND +bs, no rebound, no guarding  EXT- motor 5/5 bilateral ue and le        Labs:                        11.9   5.53  )-----------( 212      ( 21 May 2020 04:54 )             32.9     CBC Full  -  ( 21 May 2020 04:54 )  WBC Count : 5.53 K/uL  RBC Count : 3.64 M/uL  Hemoglobin : 11.9 g/dL  Hematocrit : 32.9 %  Platelet Count - Automated : 212 K/uL  Mean Cell Volume : 90.4 fL  Mean Cell Hemoglobin : 32.7 pg  Mean Cell Hemoglobin Concentration : 36.2 g/dL  Auto Neutrophil # : x  Auto Lymphocyte # : x  Auto Monocyte # : x  Auto Eosinophil # : x  Auto Basophil # : x  Auto Neutrophil % : x  Auto Lymphocyte % : x  Auto Monocyte % : x  Auto Eosinophil % : x  Auto Basophil % : x      05-21    146  |  106  |  18  ----------------------------<  79  4.1   |  28  |  1.0    Ca    9.2      21 May 2020 04:54  Mg     1.8     05-20    TPro  6.5  /  Alb  3.9  /  TBili  0.5  /  DBili  x   /  AST  24  /  ALT  <5  /  AlkPhos  97  05-21      Microbiology:    Culture - Blood (collected 18 May 2020 17:05)  Source: .Blood Blood-Peripheral  Preliminary Report (20 May 2020 02:02):    No growth to date.    Culture - Blood (collected 18 May 2020 17:00)  Source: .Blood Blood-Peripheral  Preliminary Report (20 May 2020 02:02):    No growth to date.        Vital Signs Last 24 Hrs  T(C): 36.4 (21 May 2020 15:00), Max: 36.5 (20 May 2020 20:28)  T(F): 97.5 (21 May 2020 15:00), Max: 97.7 (20 May 2020 20:28)  HR: 87 (21 May 2020 14:13) (73 - 88)  BP: 125/68 (21 May 2020 14:13) (123/84 - 172/93)  BP(mean): --  RR: 20 (21 May 2020 15:00) (18 - 20)  SpO2: --    I&O's Summary    20 May 2020 07:01  -  21 May 2020 07:00  --------------------------------------------------------  IN: 1071 mL / OUT: 0 mL / NET: 1071 mL

## 2020-05-21 NOTE — PROGRESS NOTE ADULT - ASSESSMENT
a/p:    #mechanical fall- severe orthostatics (>100 mmhg drop in systolic bp upon standing) with worsening PD symptoms and mild LUBNA  -cont gentle ivf hydration (also monitor daily ck--improving rhabdomyolysis from likely fall---ck 2000--->350 today)  -fall appears mechanical --fall precautions and PT reeval once orthostatic changes less  -neurology following  -cont droxydopa and monitor orthostasis daily   -slow position change- compression stocking and abdominal binder  -increase dose sinemet 25/100 q6hr  -ECHO  -monitor electrolytes   -covid negative/ bcx negative x 2 (no underlying infectious etiology)    #Parkinsons Disease  -neurology eval  -ct head noncontrast   -continue sinemet---increased dose 2/2 worsening symptoms  -fall precautions    #DVT/GI ppx  guarded prognosis  FULL CODE--d/w daughter Mandi and will find living will and read through and d/w brother but for now remains full code-

## 2020-05-21 NOTE — PROGRESS NOTE ADULT - ASSESSMENT
64 year old gentleman with a history of Parkinson's disease (w/ mild dementia), DM2, and HTN who was BIBEMS from [facility] for multiple falls on the day of presentation, associated with a subacute decline in mental and functional status per nursing home report. Admitted for  recurrent  Falls for to be orthostatic Posistive likely secondary to dysautonomia pt is on timolol eye drops. Symptoms are asociated with takes Florinef 0.1mg PO.  MAy benifit for midodrine. CT head and cervical spine negative. Trauma workup revealing R lateral 7th rib fracture. Vitals significant for orthostatic hypotension.  On neurological exam patient bradykinetic, bradyphrenic with cogwheel rigidity and resting tremor.  s/p 3 L NS, BUN and LUBNA improved        IMPRESSION  Recurrent Falls  complicated  by  Acute R lateral 7th rib fracture  Sever Orthostatic Hypotension  Secondary to Autonomic Dysfunction  HO Parkinson Dementia wit Psychotic Features   HO HTN   HO DM2        Plan    -Continue  sinemet to 25/100 mg q6   -Holding  fludrocortisone, midodrine   -Continue  droxidopa 100mg TID for neurogenic orthostatsis, check orthostatics 24hrs after starting   -continue compression stocking, abdominal binder   - continue with  compression stocking and abdominal binder   - Continue with  aspirin and statin  -Holding  Seroquel   -Start Nuplazid 10mg PO qhs for agitation/hallucinations (unable to start inpatient)  -haldol 1 mg PRN as needed for agitation  -track CK daily last level 351   - Continue  Bowel Regimen         Electrolyte Imbalances: Correct as needed  []  Hyponatremia   /   Hypernatremia  []   []  Hypokalemia   /   Hyperkalemia  []   []  Hypochlorhydria   /    Hypochlorhydria  []   []  Hypomagnesemia   /   Hypermagnesemia  []   []  Hypophosphatemia   /   Hyperphosphatemia  []       GI ppx:                                   [] Not indicated   /   [] Pantoprazole 40mg PO Daily    DVT ppx:  [] Not indicated / [x] Heparin 5000mg SubQ / [] Lovenox 40mg SubQ / [] SCDs    Feeds:   [x] PO  |  [] IVF    Activity:  [X] Assisatnce needed   [X] Increase as Tolerated  /  [] OOB w/ assist  /  [] Bed Rest    Weight 88.5kg      DISPO:  Patient to be discharged when condition(s) optimized.  [ ] From Home     [ x] NH/SNF   [ ] 4A Rehab  [ ] Detox Clinic  [X] Plan Discussion with patient and/or family.  [X] Discussed Case and Plan with the Medical Attending.    CODE STATUS  [X] FULL   /    [] DNR

## 2020-05-21 NOTE — GOALS OF CARE CONVERSATION - ADVANCED CARE PLANNING - CONVERSATION DETAILS
d/w patients daughter Mandi on phone- will locate patients living will, review what it states and d/w brother- for now remains full code

## 2020-05-21 NOTE — PROGRESS NOTE ADULT - SUBJECTIVE AND OBJECTIVE BOX
LENGTH OF HOSPITAL STAY: 3d      CHIEF COMPLAINT:   Patient is a 64y old  Male who presents with a chief complaint of Fall (20 May 2020 14:19)      OVER Past 24hrs:  The patient was seen and examined at bedside there were no acute vents  during the night. Patient is confused at bedside.       PAST MEDICAL & SURGICAL HISTORY  PAST MEDICAL & SURGICAL HISTORY:  Mild dementia  Parkinson's disease  Diabetes 1.5, managed as type 2  No significant past surgical history        REVIEW OF SYSTEMS  CONSTITUTIONAL: No fever  RESPIRATORY: No coughNo shortness of breath  CARDIOVASCULAR: No chest pain, palpitations,  GASTROINTESTINAL: No abdominal or epigastric pain.   GENITOURINARY: No dysuria,  NEUROLOGICAL: No headaches,    ALLERGIES:  No Known Allergies    MEDICATIONS:  STANDING MEDICATIONS  aspirin enteric coated 81 milliGRAM(s) Oral daily  atorvastatin 20 milliGRAM(s) Oral at bedtime  carbidopa/levodopa  25/100 1 Tablet(s) Oral every 6 hours  droxidopa 100 milliGRAM(s) Oral three times a day  famotidine  Oral Tab/Cap - Peds 20 milliGRAM(s) Oral daily  heparin   Injectable 5000 Unit(s) SubCutaneous every 8 hours  insulin lispro (HumaLOG) corrective regimen sliding scale   SubCutaneous three times a day before meals  latanoprost 0.005% Ophthalmic Solution 1 Drop(s) Both EYES at bedtime  senna 2 Tablet(s) Oral at bedtime  timolol 0.5% Solution 1 Drop(s) Both EYES at bedtime    PRN MEDICATIONS  acetaminophen   Tablet .. 650 milliGRAM(s) Oral every 6 hours PRN  haloperidol     Tablet 1 milliGRAM(s) Oral every 6 hours PRN    VITALS:   T(F): 96.4  HR: 73  BP: 172/93  RR: 18  SpO2: --    PHYSICAL EXAM:  General: No acute distress.   interactive, nonfocal. Elderly   male with abdominal binder in bed    HEENT: Pupils, reactive to light     PULM: Clear to auscultation bilaterally, no significant sputum production.    CVS: Regular rate and rhythm, no murmurs, rubs, or gallops.    GI: Soft, nondistended, nontender, no masses.    MSK: No edema, nontender. Silver stockings on LE, Cogwheel rigidity     SKIN: Warm and well perfused, no rashes noted.    PSYCH: AAO1-2    NEURO: Non  focal     LABS:                        11.9   5.53  )-----------( 212      ( 21 May 2020 04:54 )             32.9     05-21    146  |  106  |  18  ----------------------------<  79  4.1   |  28  |  1.0    Ca    9.2      21 May 2020 04:54  Mg     1.8     05-20    TPro  6.5  /  Alb  3.9  /  TBili  0.5  /  DBili  x   /  AST  24  /  ALT  <5  /  AlkPhos  97  05-21          Creatine Kinase, Serum: 351 U/L <H> (05-21-20 @ 04:54)  Creatine Kinase, Serum: 932 U/L <H> (05-20-20 @ 16:47)      Culture - Blood (collected 18 May 2020 17:05)  Source: .Blood Blood-Peripheral  Preliminary Report (20 May 2020 02:02):    No growth to date.    Culture - Blood (collected 18 May 2020 17:00)  Source: .Blood Blood-Peripheral  Preliminary Report (20 May 2020 02:02):    No growth to date.      CARDIAC MARKERS ( 21 May 2020 04:54 )  x     / x     / 351 U/L / x     / x      CARDIAC MARKERS ( 20 May 2020 16:47 )  x     / x     / 932 U/L / x     / x          RADIOLOGY:    < from: CT Angio Chest Dissection Protocol (05.18.20 @ 19:33) >  FINDINGS ABDOMEN AND PELVIS:    HEPATIC: There is hepatic steatosis with no evidence of mass or bile duct dilatation.      BILIARY:No calcified gallstones are noted.    SPLEEN: Unremarkable.    PANCREAS: The pancreas is normal in size and configuration. No evidence of mass or pancreatitis.    ADRENAL GLANDS: There is mild nodularity of the right adrenal gland. The left adrenal gland is unremarkable.    KIDNEYS: No evidence of hydronephrosis, calcified stones, or solid renal mass.     ABDOMINOPELVIC NODES: Unremarkable.    PELVIC ORGANS: No evidence of pelvic mass, lymphadenopathy, or fluid collection..        PERITONEUM/MESENTERY/BOWEL: No evidence of obstruction, colitis, inflammatory process, or ascites within the abdomen or pelvis. The appendix is normal in appearance.    BONES/SOFT TISSUES: Degenerative changes of the spine are noted. Status post right total hip replacement. Spondylolysis at L4 with grade 1 anterolisthesis at L4-5.    VASCULAR: Vascular calcifications are noted with no evidence of abdominal aortic aneurysm or dissection. No intimal flap or double lumen. No displacement of atherosclerotic calcifications into the lumen.  The celiac axis, the superior mesenteric artery, the inferior mesenteric artery, and the renal arteriesare patent without flow-limiting stenosis. Single renal arteries are seen bilaterally.        IMPRESSION: No evidence of aortic aneurysm or dissection.    There is mild deformity of the right lateral seventh rib compatible with a nondisplaced fracture.     < end of copied text >      < from: CT Head No Cont (05.18.20 @ 19:31) >    IMPRESSION:    No acute intracranial abnormalities.    < end of copied text >  < from: CT Cervical Spine No Cont (05.18.20 @ 19:32) >  IMPRESSION:    1. No acute osseous abnormality.  2. Diffuse degenerative change as above.      < end of copied text >  < from: 12 Lead ECG (05.18.20 @ 16:44) >  QTC Calculation(Bezet) 411 ms    P Axis 63 degrees    R Axis 45 degrees    T Axis 70 degrees    Diagnosis Line Normal sinus rhythm  Possible Septal infarct , age undetermined  Abnormal ECG    < end of copied text >        < from: Transthoracic Echocardiogram (05.19.20 @ 14:58) >    Summary:   1. LV Ejection Fraction by Reynolds's Method with a biplane EF of 62 %.   2. Spectral Doppler shows impaired relaxationpattern of left ventricular myocardial filling (Grade I diastolic dysfunction).   3. Mild mitral valve regurgitation.   4. Mitral annular calcification.   5. Thickening and calcification of the anterior and posterior mitral valve leaflets.   6. Aortic valve thickening and calcification with decreased leaflet opening.   7. Peak transaortic gradient equals 30.7 mmHg, mean transaortic gradient equals 14.5 mmHg, the calculated aortic valve area equals 1.90 cm² by the continuity equation consistent with mild aortic stenosis.    < end of copied text >

## 2020-05-22 DIAGNOSIS — R41.0 DISORIENTATION, UNSPECIFIED: ICD-10-CM

## 2020-05-22 DIAGNOSIS — F03.90 UNSPECIFIED DEMENTIA WITHOUT BEHAVIORAL DISTURBANCE: ICD-10-CM

## 2020-05-22 LAB
ALBUMIN SERPL ELPH-MCNC: 3.9 G/DL — SIGNIFICANT CHANGE UP (ref 3.5–5.2)
ALP SERPL-CCNC: 100 U/L — SIGNIFICANT CHANGE UP (ref 30–115)
ALT FLD-CCNC: <5 U/L — SIGNIFICANT CHANGE UP (ref 0–41)
ANION GAP SERPL CALC-SCNC: 12 MMOL/L — SIGNIFICANT CHANGE UP (ref 7–14)
ANION GAP SERPL CALC-SCNC: 13 MMOL/L — SIGNIFICANT CHANGE UP (ref 7–14)
APPEARANCE UR: CLEAR — SIGNIFICANT CHANGE UP
AST SERPL-CCNC: 22 U/L — SIGNIFICANT CHANGE UP (ref 0–41)
BASOPHILS # BLD AUTO: 0.04 K/UL — SIGNIFICANT CHANGE UP (ref 0–0.2)
BASOPHILS NFR BLD AUTO: 0.7 % — SIGNIFICANT CHANGE UP (ref 0–1)
BILIRUB SERPL-MCNC: 0.5 MG/DL — SIGNIFICANT CHANGE UP (ref 0.2–1.2)
BILIRUB UR-MCNC: NEGATIVE — SIGNIFICANT CHANGE UP
BUN SERPL-MCNC: 21 MG/DL — HIGH (ref 10–20)
BUN SERPL-MCNC: 21 MG/DL — HIGH (ref 10–20)
CALCIUM SERPL-MCNC: 9 MG/DL — SIGNIFICANT CHANGE UP (ref 8.5–10.1)
CALCIUM SERPL-MCNC: 9.2 MG/DL — SIGNIFICANT CHANGE UP (ref 8.5–10.1)
CHLORIDE SERPL-SCNC: 103 MMOL/L — SIGNIFICANT CHANGE UP (ref 98–110)
CHLORIDE SERPL-SCNC: 104 MMOL/L — SIGNIFICANT CHANGE UP (ref 98–110)
CK SERPL-CCNC: 187 U/L — SIGNIFICANT CHANGE UP (ref 0–225)
CO2 SERPL-SCNC: 25 MMOL/L — SIGNIFICANT CHANGE UP (ref 17–32)
CO2 SERPL-SCNC: 27 MMOL/L — SIGNIFICANT CHANGE UP (ref 17–32)
COLOR SPEC: YELLOW — SIGNIFICANT CHANGE UP
CREAT SERPL-MCNC: 0.9 MG/DL — SIGNIFICANT CHANGE UP (ref 0.7–1.5)
CREAT SERPL-MCNC: 0.9 MG/DL — SIGNIFICANT CHANGE UP (ref 0.7–1.5)
DIFF PNL FLD: NEGATIVE — SIGNIFICANT CHANGE UP
EOSINOPHIL # BLD AUTO: 0.17 K/UL — SIGNIFICANT CHANGE UP (ref 0–0.7)
EOSINOPHIL NFR BLD AUTO: 3 % — SIGNIFICANT CHANGE UP (ref 0–8)
GLUCOSE BLDC GLUCOMTR-MCNC: 101 MG/DL — HIGH (ref 70–99)
GLUCOSE BLDC GLUCOMTR-MCNC: 114 MG/DL — HIGH (ref 70–99)
GLUCOSE BLDC GLUCOMTR-MCNC: 150 MG/DL — HIGH (ref 70–99)
GLUCOSE BLDC GLUCOMTR-MCNC: 95 MG/DL — SIGNIFICANT CHANGE UP (ref 70–99)
GLUCOSE SERPL-MCNC: 100 MG/DL — HIGH (ref 70–99)
GLUCOSE SERPL-MCNC: 99 MG/DL — SIGNIFICANT CHANGE UP (ref 70–99)
GLUCOSE UR QL: NEGATIVE — SIGNIFICANT CHANGE UP
HCT VFR BLD CALC: 35.9 % — LOW (ref 42–52)
HGB BLD-MCNC: 12.4 G/DL — LOW (ref 14–18)
IMM GRANULOCYTES NFR BLD AUTO: 0.3 % — SIGNIFICANT CHANGE UP (ref 0.1–0.3)
KETONES UR-MCNC: SIGNIFICANT CHANGE UP
LEUKOCYTE ESTERASE UR-ACNC: NEGATIVE — SIGNIFICANT CHANGE UP
LYMPHOCYTES # BLD AUTO: 1.01 K/UL — LOW (ref 1.2–3.4)
LYMPHOCYTES # BLD AUTO: 17.5 % — LOW (ref 20.5–51.1)
MAGNESIUM SERPL-MCNC: 1.3 MG/DL — LOW (ref 1.8–2.4)
MCHC RBC-ENTMCNC: 31.1 PG — HIGH (ref 27–31)
MCHC RBC-ENTMCNC: 34.5 G/DL — SIGNIFICANT CHANGE UP (ref 32–37)
MCV RBC AUTO: 90 FL — SIGNIFICANT CHANGE UP (ref 80–94)
MONOCYTES # BLD AUTO: 0.5 K/UL — SIGNIFICANT CHANGE UP (ref 0.1–0.6)
MONOCYTES NFR BLD AUTO: 8.7 % — SIGNIFICANT CHANGE UP (ref 1.7–9.3)
NEUTROPHILS # BLD AUTO: 4.02 K/UL — SIGNIFICANT CHANGE UP (ref 1.4–6.5)
NEUTROPHILS NFR BLD AUTO: 69.8 % — SIGNIFICANT CHANGE UP (ref 42.2–75.2)
NITRITE UR-MCNC: NEGATIVE — SIGNIFICANT CHANGE UP
NRBC # BLD: 0 /100 WBCS — SIGNIFICANT CHANGE UP (ref 0–0)
PH UR: 6 — SIGNIFICANT CHANGE UP (ref 5–8)
PLATELET # BLD AUTO: 213 K/UL — SIGNIFICANT CHANGE UP (ref 130–400)
POTASSIUM SERPL-MCNC: 4.3 MMOL/L — SIGNIFICANT CHANGE UP (ref 3.5–5)
POTASSIUM SERPL-MCNC: 4.5 MMOL/L — SIGNIFICANT CHANGE UP (ref 3.5–5)
POTASSIUM SERPL-SCNC: 4.3 MMOL/L — SIGNIFICANT CHANGE UP (ref 3.5–5)
POTASSIUM SERPL-SCNC: 4.5 MMOL/L — SIGNIFICANT CHANGE UP (ref 3.5–5)
PROT SERPL-MCNC: 6.7 G/DL — SIGNIFICANT CHANGE UP (ref 6–8)
PROT UR-MCNC: SIGNIFICANT CHANGE UP
RBC # BLD: 3.99 M/UL — LOW (ref 4.7–6.1)
RBC # FLD: 12.1 % — SIGNIFICANT CHANGE UP (ref 11.5–14.5)
SODIUM SERPL-SCNC: 142 MMOL/L — SIGNIFICANT CHANGE UP (ref 135–146)
SODIUM SERPL-SCNC: 142 MMOL/L — SIGNIFICANT CHANGE UP (ref 135–146)
SP GR SPEC: 1.02 — SIGNIFICANT CHANGE UP (ref 1.01–1.02)
UROBILINOGEN FLD QL: ABNORMAL
WBC # BLD: 5.76 K/UL — SIGNIFICANT CHANGE UP (ref 4.8–10.8)
WBC # FLD AUTO: 5.76 K/UL — SIGNIFICANT CHANGE UP (ref 4.8–10.8)

## 2020-05-22 PROCEDURE — 99233 SBSQ HOSP IP/OBS HIGH 50: CPT

## 2020-05-22 PROCEDURE — 99231 SBSQ HOSP IP/OBS SF/LOW 25: CPT

## 2020-05-22 PROCEDURE — 99232 SBSQ HOSP IP/OBS MODERATE 35: CPT

## 2020-05-22 RX ORDER — QUETIAPINE FUMARATE 200 MG/1
12.5 TABLET, FILM COATED ORAL AT BEDTIME
Refills: 0 | Status: DISCONTINUED | OUTPATIENT
Start: 2020-05-22 | End: 2020-05-23

## 2020-05-22 RX ORDER — PIMAVANSERIN TARTRATE 10 MG/1
10 TABLET, COATED ORAL AT BEDTIME
Refills: 0 | Status: DISCONTINUED | OUTPATIENT
Start: 2020-05-22 | End: 2020-05-26

## 2020-05-22 RX ORDER — MAGNESIUM SULFATE 500 MG/ML
2 VIAL (ML) INJECTION ONCE
Refills: 0 | Status: COMPLETED | OUTPATIENT
Start: 2020-05-22 | End: 2020-05-22

## 2020-05-22 RX ADMIN — DROXIDOPA 100 MILLIGRAM(S): 100 CAPSULE ORAL at 11:58

## 2020-05-22 RX ADMIN — SENNA PLUS 2 TABLET(S): 8.6 TABLET ORAL at 22:05

## 2020-05-22 RX ADMIN — QUETIAPINE FUMARATE 12.5 MILLIGRAM(S): 200 TABLET, FILM COATED ORAL at 22:05

## 2020-05-22 RX ADMIN — HEPARIN SODIUM 5000 UNIT(S): 5000 INJECTION INTRAVENOUS; SUBCUTANEOUS at 22:04

## 2020-05-22 RX ADMIN — ATORVASTATIN CALCIUM 20 MILLIGRAM(S): 80 TABLET, FILM COATED ORAL at 22:05

## 2020-05-22 RX ADMIN — FAMOTIDINE 20 MILLIGRAM(S): 10 INJECTION INTRAVENOUS at 11:57

## 2020-05-22 RX ADMIN — LATANOPROST 1 DROP(S): 0.05 SOLUTION/ DROPS OPHTHALMIC; TOPICAL at 22:04

## 2020-05-22 RX ADMIN — CARBIDOPA AND LEVODOPA 1 TABLET(S): 25; 100 TABLET ORAL at 11:57

## 2020-05-22 RX ADMIN — DROXIDOPA 100 MILLIGRAM(S): 100 CAPSULE ORAL at 17:13

## 2020-05-22 RX ADMIN — PIMAVANSERIN TARTRATE 10 MILLIGRAM(S): 10 TABLET, COATED ORAL at 22:05

## 2020-05-22 RX ADMIN — HEPARIN SODIUM 5000 UNIT(S): 5000 INJECTION INTRAVENOUS; SUBCUTANEOUS at 13:15

## 2020-05-22 RX ADMIN — DROXIDOPA 100 MILLIGRAM(S): 100 CAPSULE ORAL at 05:21

## 2020-05-22 RX ADMIN — Medication 81 MILLIGRAM(S): at 11:57

## 2020-05-22 RX ADMIN — Medication 50 GRAM(S): at 10:02

## 2020-05-22 RX ADMIN — CARBIDOPA AND LEVODOPA 1 TABLET(S): 25; 100 TABLET ORAL at 05:21

## 2020-05-22 RX ADMIN — CARBIDOPA AND LEVODOPA 1 TABLET(S): 25; 100 TABLET ORAL at 17:13

## 2020-05-22 RX ADMIN — CARBIDOPA AND LEVODOPA 1 TABLET(S): 25; 100 TABLET ORAL at 23:40

## 2020-05-22 RX ADMIN — Medication 1 DROP(S): at 22:05

## 2020-05-22 RX ADMIN — HEPARIN SODIUM 5000 UNIT(S): 5000 INJECTION INTRAVENOUS; SUBCUTANEOUS at 05:21

## 2020-05-22 NOTE — BEHAVIORAL HEALTH ASSESSMENT NOTE - NSBHCONSULTRECOMMENDOTHER_PSY_A_CORE FT
Recommend discussion with patient's family about the use of antipsychotics in the  management of patient, the associated risks and benefits  Recommend speaking to the doctor who started patient's Sinemet to asses the onset of the hallucination with when patient's sinemet was started.

## 2020-05-22 NOTE — BEHAVIORAL HEALTH ASSESSMENT NOTE - VIOLENCE RISK FACTORS:
Feeling of being under threat and being unable to control threat/Antisocial behavior/cognition (past or present)

## 2020-05-22 NOTE — BEHAVIORAL HEALTH ASSESSMENT NOTE - NSBHCONSULTFOLLOWAFTERCARE_PSY_A_CORE FT
Recommend follow up with the psychiatrist at the [facility] with the plan to consider Celexa for the management of agitation in patients with dementia.

## 2020-05-22 NOTE — PROGRESS NOTE ADULT - SUBJECTIVE AND OBJECTIVE BOX
LENGTH OF HOSPITAL STAY: 4d      CHIEF COMPLAINT:   Patient is a 64y old  Male who presents with a chief complaint of Fall (22 May 2020 09:57)      OVER Past 24hrs:  The patient was seen and examined at bedside there were no acute events  during.        PAST MEDICAL & SURGICAL HISTORY  PAST MEDICAL & SURGICAL HISTORY:  Mild dementia  Parkinson's disease  Diabetes 1.5, managed as type 2  No significant past surgical history        REVIEW OF SYSTEMS  CONSTITUTIONAL: No fever  RESPIRATORY: No cough No shortness of breath  CARDIOVASCULAR: No chest pain, palpitations,  GASTROINTESTINAL: No abdominal or epigastric pain.   GENITOURINARY: No dysuria,  NEUROLOGICAL: No headaches,      ALLERGIES:  No Known Allergies    MEDICATIONS:  STANDING MEDICATIONS  aspirin enteric coated 81 milliGRAM(s) Oral daily  atorvastatin 20 milliGRAM(s) Oral at bedtime  carbidopa/levodopa  25/100 1 Tablet(s) Oral every 6 hours  droxidopa 100 milliGRAM(s) Oral three times a day  famotidine  Oral Tab/Cap - Peds 20 milliGRAM(s) Oral daily  heparin   Injectable 5000 Unit(s) SubCutaneous every 8 hours  insulin lispro (HumaLOG) corrective regimen sliding scale   SubCutaneous three times a day before meals  latanoprost 0.005% Ophthalmic Solution 1 Drop(s) Both EYES at bedtime  senna 2 Tablet(s) Oral at bedtime  timolol 0.5% Solution 1 Drop(s) Both EYES at bedtime    PRN MEDICATIONS  acetaminophen   Tablet .. 650 milliGRAM(s) Oral every 6 hours PRN    VITALS:   T(F): 97.7  HR: 85  BP: 140/83  RR: 20  SpO2: --    PHYSICAL EXAM:  General: No acute distress.   interactive, nonfocal. Elderly   male with abdominal binder in bed    HEENT: Pupils, reactive to light     PULM: Clear to auscultation bilaterally, no significant sputum production.    CVS: Regular rate and rhythm, no murmurs, rubs, or gallops.    GI: Soft, nondistended, nontender, no masses.    MSK: No edema, nontender. Silver stockings on LE, Cogwheel rigidity     SKIN: Warm and well perfused, no rashes noted.    PSYCH: AAO1-2    NEURO: Non  focal     LABS:                        12.4   5.76  )-----------( 213      ( 22 May 2020 06:48 )             35.9     05-22    142  |  104  |  21<H>  ----------------------------<  99  4.5   |  25  |  0.9    Ca    9.0      22 May 2020 06:48  Mg     1.3     05-22    TPro  6.7  /  Alb  3.9  /  TBili  0.5  /  DBili  x   /  AST  22  /  ALT  <5  /  AlkPhos  100  05-22          Creatine Kinase, Serum: 187 U/L (05-22-20 @ 06:48)      CARDIAC MARKERS ( 22 May 2020 06:48 )  x     / x     / 187 U/L / x     / x      CARDIAC MARKERS ( 21 May 2020 04:54 )  x     / x     / 351 U/L / x     / x      CARDIAC MARKERS ( 20 May 2020 16:47 )  x     / x     / 932 U/L / x     / x          RADIOLOGY:

## 2020-05-22 NOTE — PROGRESS NOTE ADULT - SUBJECTIVE AND OBJECTIVE BOX
Neurology Progress Note    Interval History:  Pr remains severely orthostatic.  started droxidopa yesterday.      HPI:  Patient is a 64 year old gentleman with a history of Parkinson's disease (w/ mild dementia), DM2, and HTN who was BIBEMS from [facility] for multiple falls on the day of presentation. Per patient history, he had 3 falls today. The first two were early in the morning, the last one was in the evening. He states that he had been standing for a few minutes before each fall and suddenly felt lightheaded/"woozy". He fell back and hit his head all three times. He denies any preceding confusion, chest pain, shortness of breath but does not endorse feeling as if his heart was racing throughout the day. He states after the fall he was able to get back up right away with no loss of consciousness or post-fall confusion. He denies any associated urinary/fecal incontinence. He states that he has been feeling palpitations intermittently for the past few months. ROS negative for any fevers/chills, vision changes, sore throat/runny nose, chest pain, abdominal pain, diarrhea/constipation. Patient reports has recently stared walking with a walker for stability for the past few months. Per [facility], patient has been having more frequent falls over the last few days and after two falls today they decided to have him sent in for reevaluation. They have also noticed a decline in his mental status where has been more confused and forgetful recently. [place] corroborates that there is no documentation of LOC or incontinence with falls today.    Patient initially presented to [facility]. BP on arrival was 90s/50s, HR 96, with lactate of 6.8; point of care ultrasound demonstrated small pericardial effusion. He was transferred to [place] for further workup. His BP on presentation to [place] was 146/84, HR 94, lactate 2.2 after receiving 1L of LR. Trauma workup was negative except for non-displaced fracture of R lateral 7th rib. Patient was cleared by trauma service and will be admitted to medicine for further workup of recurrent falls. (18 May 2020 23:10)    PAST MEDICAL & SURGICAL HISTORY:  Mild dementia  Parkinson's disease  Diabetes 1.5, managed as type 2  No significant past surgical history    Medications:  acetaminophen   Tablet .. 650 milliGRAM(s) Oral every 6 hours PRN  aspirin enteric coated 81 milliGRAM(s) Oral daily  atorvastatin 20 milliGRAM(s) Oral at bedtime  carbidopa/levodopa  25/100 1 Tablet(s) Oral every 6 hours  droxidopa 100 milliGRAM(s) Oral three times a day  famotidine  Oral Tab/Cap - Peds 20 milliGRAM(s) Oral daily  heparin   Injectable 5000 Unit(s) SubCutaneous every 8 hours  insulin lispro (HumaLOG) corrective regimen sliding scale   SubCutaneous three times a day before meals  latanoprost 0.005% Ophthalmic Solution 1 Drop(s) Both EYES at bedtime  magnesium sulfate  IVPB 2 Gram(s) IV Intermittent once  senna 2 Tablet(s) Oral at bedtime  timolol 0.5% Solution 1 Drop(s) Both EYES at bedtime    Vital Signs Last 24 Hrs  T(C): 36.5 (22 May 2020 05:06), Max: 36.7 (21 May 2020 20:50)  T(F): 97.7 (22 May 2020 05:06), Max: 98 (21 May 2020 20:50)  HR: 85 (22 May 2020 07:59) (76 - 87)  BP: 140/83 (22 May 2020 07:59) (125/68 - 198/97)  BP(mean): --  RR: 20 (22 May 2020 05:06) (20 - 20)  SpO2: --    orthostatics (5/21)  159/93 > 98/61    Examination:  A&O x 2 (person, place)  Bradykinetic, Bradyphrenic, masked facies   Cogwheel rigidity noted in UE R>L   resting tremor noted  intact VA, VFF.  EOMI w/o nystagmus, skew or reported double vision.  PERRL.   Facial sensation normal V1 - 3, no facial asymmetry.     Hearing grossly intact b/l.  Palate elevates midline.  Tongue and uvula midline.   Muscle strength: 5/5 UE; 5/5 LE.  No observable drift.  Sensation Intact to light touch in all extremities.  Gait deferred     Labs:  CBC Full  -  ( 22 May 2020 06:48 )  WBC Count : 5.76 K/uL  RBC Count : 3.99 M/uL  Hemoglobin : 12.4 g/dL  Hematocrit : 35.9 %  Platelet Count - Automated : 213 K/uL  Mean Cell Volume : 90.0 fL  Mean Cell Hemoglobin : 31.1 pg  Mean Cell Hemoglobin Concentration : 34.5 g/dL  Auto Neutrophil # : 4.02 K/uL  Auto Lymphocyte # : 1.01 K/uL  Auto Monocyte # : 0.50 K/uL  Auto Eosinophil # : 0.17 K/uL  Auto Basophil # : 0.04 K/uL  Auto Neutrophil % : 69.8 %  Auto Lymphocyte % : 17.5 %  Auto Monocyte % : 8.7 %  Auto Eosinophil % : 3.0 %  Auto Basophil % : 0.7 %    05-22    142  |  104  |  21<H>  ----------------------------<  99  4.5   |  25  |  0.9    Ca    9.0      22 May 2020 06:48  Mg     1.3     05-22    TPro  6.7  /  Alb  3.9  /  TBili  0.5  /  DBili  x   /  AST  22  /  ALT  <5  /  AlkPhos  100  05-22    LIVER FUNCTIONS - ( 22 May 2020 06:48 )  Alb: 3.9 g/dL / Pro: 6.7 g/dL / ALK PHOS: 100 U/L / ALT: <5 U/L / AST: 22 U/L / GGT: x             Creatine Kinase, Serum (05.22.20 @ 06:48)    Creatine Kinase, Serum: 187 U/L Neurology Progress Note    Interval History:  Pt remains severely orthostatic.  started droxidopa yesterday.  Emotionally labile today.  Has not started Nuplazid yet.      HPI:  Patient is a 64 year old gentleman with a history of Parkinson's disease (w/ mild dementia), DM2, and HTN who was BIBEMS from [facility] for multiple falls on the day of presentation. Per patient history, he had 3 falls today. The first two were early in the morning, the last one was in the evening. He states that he had been standing for a few minutes before each fall and suddenly felt lightheaded/"woozy". He fell back and hit his head all three times. He denies any preceding confusion, chest pain, shortness of breath but does not endorse feeling as if his heart was racing throughout the day. He states after the fall he was able to get back up right away with no loss of consciousness or post-fall confusion. He denies any associated urinary/fecal incontinence. He states that he has been feeling palpitations intermittently for the past few months. ROS negative for any fevers/chills, vision changes, sore throat/runny nose, chest pain, abdominal pain, diarrhea/constipation. Patient reports has recently stared walking with a walker for stability for the past few months. Per [facility], patient has been having more frequent falls over the last few days and after two falls today they decided to have him sent in for reevaluation. They have also noticed a decline in his mental status where has been more confused and forgetful recently. [place] corroborates that there is no documentation of LOC or incontinence with falls today.    Patient initially presented to [facility]. BP on arrival was 90s/50s, HR 96, with lactate of 6.8; point of care ultrasound demonstrated small pericardial effusion. He was transferred to [place] for further workup. His BP on presentation to [place] was 146/84, HR 94, lactate 2.2 after receiving 1L of LR. Trauma workup was negative except for non-displaced fracture of R lateral 7th rib. Patient was cleared by trauma service and will be admitted to medicine for further workup of recurrent falls. (18 May 2020 23:10)    PAST MEDICAL & SURGICAL HISTORY:  Mild dementia  Parkinson's disease  Diabetes 1.5, managed as type 2  No significant past surgical history    Medications:  acetaminophen   Tablet .. 650 milliGRAM(s) Oral every 6 hours PRN  aspirin enteric coated 81 milliGRAM(s) Oral daily  atorvastatin 20 milliGRAM(s) Oral at bedtime  carbidopa/levodopa  25/100 1 Tablet(s) Oral every 6 hours  droxidopa 100 milliGRAM(s) Oral three times a day  famotidine  Oral Tab/Cap - Peds 20 milliGRAM(s) Oral daily  heparin   Injectable 5000 Unit(s) SubCutaneous every 8 hours  insulin lispro (HumaLOG) corrective regimen sliding scale   SubCutaneous three times a day before meals  latanoprost 0.005% Ophthalmic Solution 1 Drop(s) Both EYES at bedtime  magnesium sulfate  IVPB 2 Gram(s) IV Intermittent once  senna 2 Tablet(s) Oral at bedtime  timolol 0.5% Solution 1 Drop(s) Both EYES at bedtime    Vital Signs Last 24 Hrs  T(C): 36.5 (22 May 2020 05:06), Max: 36.7 (21 May 2020 20:50)  T(F): 97.7 (22 May 2020 05:06), Max: 98 (21 May 2020 20:50)  HR: 85 (22 May 2020 07:59) (76 - 87)  BP: 140/83 (22 May 2020 07:59) (125/68 - 198/97)  BP(mean): --  RR: 20 (22 May 2020 05:06) (20 - 20)  SpO2: --    orthostatics (5/21)  159/93 > 98/61    Examination:  A&O x 2 (person, place)  Bradykinetic, Bradyphrenic, masked facies   Cogwheel rigidity noted in UE R>L   resting tremor noted  intact VA, VFF.  EOMI w/o nystagmus, skew or reported double vision.  PERRL.   Facial sensation normal V1 - 3, no facial asymmetry.     Hearing grossly intact b/l.  Palate elevates midline.  Tongue and uvula midline.   Muscle strength: 5/5 UE; 5/5 LE.  No observable drift.  Sensation Intact to light touch in all extremities.  Gait deferred     Labs:  CBC Full  -  ( 22 May 2020 06:48 )  WBC Count : 5.76 K/uL  RBC Count : 3.99 M/uL  Hemoglobin : 12.4 g/dL  Hematocrit : 35.9 %  Platelet Count - Automated : 213 K/uL  Mean Cell Volume : 90.0 fL  Mean Cell Hemoglobin : 31.1 pg  Mean Cell Hemoglobin Concentration : 34.5 g/dL  Auto Neutrophil # : 4.02 K/uL  Auto Lymphocyte # : 1.01 K/uL  Auto Monocyte # : 0.50 K/uL  Auto Eosinophil # : 0.17 K/uL  Auto Basophil # : 0.04 K/uL  Auto Neutrophil % : 69.8 %  Auto Lymphocyte % : 17.5 %  Auto Monocyte % : 8.7 %  Auto Eosinophil % : 3.0 %  Auto Basophil % : 0.7 %    05-22    142  |  104  |  21<H>  ----------------------------<  99  4.5   |  25  |  0.9    Ca    9.0      22 May 2020 06:48  Mg     1.3     05-22    TPro  6.7  /  Alb  3.9  /  TBili  0.5  /  DBili  x   /  AST  22  /  ALT  <5  /  AlkPhos  100  05-22    LIVER FUNCTIONS - ( 22 May 2020 06:48 )  Alb: 3.9 g/dL / Pro: 6.7 g/dL / ALK PHOS: 100 U/L / ALT: <5 U/L / AST: 22 U/L / GGT: x           Creatine Kinase, Serum (05.22.20 @ 06:48)  Creatine Kinase, Serum: 187 U/L

## 2020-05-22 NOTE — BEHAVIORAL HEALTH ASSESSMENT NOTE - SUMMARY
Mr Javed is a 64 year old man with no history of a psychiatric illness but has Parkinsons' s disease who was admitted to the medical floor for the evaluation of multiple falls. Psychiatric consult was called for the medication management of his agitation as patient is on Seroquel and has been found to have orthostatic hypotension.   Patient appears confused, has visual hallucinations, has labile mood and is episodically aggressive and agitated. Given the collateral information obtained, patient’s symptoms started about 4 weeks ago and is not in keeping his baseline mental status. Since he is diagnosed with Parkinson’s disease, it is possible that patient has an underlying neurocognitive impairment upon which the delirium is superimposed.   He does not appear to be acutely psychotic, manic or depressed. He denies current suicidal ideations, intent or plan.   At this time, patient is not considered an imminent danger to himself or others and does not need inpatient psychiatric hospitalization. Patient will benefit from a thorough delirium work up given his current presentation seems to be a significant deviation from his baseline mental status. With regards to his agitation, aggression and mood lability, the recommendation is verbal redirection, behavioral interventions and environmental modifications to help his orientation and attempt reassurance. Given his diagnosis of Parkinson’s disease, high potency antipsychotics like Haldol will predispose to EPS especially Neuroleptic malignant Syndrome and worsen his Parkinson symptoms  making Seroquel the preferred antipsychotic to manage his agitation.  In this case however, patient has orthostatic hypotension that can be worsened with the use of Seroquel. In this situation a risk versus benefit of the use of antipsychotic will need to be discussed with patient and his family especially given the black box warning of the risk of stroke and potential death in elderly patient’s with dementia. We would recommend further brain imaging as per neurology. Upon discharge, patient can follow up with his outpatient psychiatrist at his residence and the use of Celexa in the management of agitation in Dementia can be considered . Mr Javed is a 64 year old man with no history of a psychiatric illness but has Parkinsons' s disease who was admitted to the medical floor for the evaluation of multiple falls. Psychiatric consult was called for the medication management of his agitation as patient is on Seroquel and has been found to have orthostatic hypotension.   Patient appears confused, has visual hallucinations, has labile mood and is episodically aggressive and agitated. Given the collateral information obtained, patient’s symptoms started about 4 weeks ago and is not in keeping his baseline mental status. Since he is diagnosed with Parkinson’s disease, it is possible that patient has an underlying neurocognitive impairment upon which the delirium is superimposed.   He does not appear to be acutely psychotic, manic or depressed. He denies current suicidal ideations, intent or plan.   At this time, patient is not considered an imminent danger to himself or others and does not need inpatient psychiatric hospitalization. Patient will benefit from a thorough delirium work up given his current presentation seems to be a significant deviation from his baseline mental status. With regards to his agitation, aggression and mood lability, the recommendation is verbal redirection, behavioral interventions and environmental modifications to help his orientation and attempt reassurance. We also recommend speaking to the doctor who started patient's Sinemet to asses the onset of the hallucination with when patient's sinemet was started. Given his diagnosis of Parkinson’s disease, high potency antipsychotics like Haldol will predispose to EPS especially Neuroleptic malignant Syndrome and worsen his Parkinson symptoms  making Seroquel the preferred antipsychotic to manage his agitation.  In this case however, patient has orthostatic hypotension that can be worsened with the use of Seroquel. In this situation a risk versus benefit of the use of antipsychotic will need to be discussed with patient and his family especially given the black box warning of the risk of stroke and potential death in elderly patient’s with dementia. We would recommend further brain imaging as per neurology. Upon discharge, patient can follow up with his outpatient psychiatrist at his residence and the use of Celexa in the management of agitation in Dementia can be considered . For now , gabapentin may be considered for anxiety and agitation.

## 2020-05-22 NOTE — BEHAVIORAL HEALTH ASSESSMENT NOTE - RISK ASSESSMENT
Low Acute Suicide Risk Risk factors for suicide in this patient are his age, his chronic , debilitating medical problems however patient has no history of a psychiatric illness , has no past suicide attempts or current suicidal ideations
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
## 2020-05-22 NOTE — PROGRESS NOTE ADULT - ASSESSMENT
64M history of Parkinson's disease (w/ mild dementia), DM2, and HTN who was BIBEMS from [facility] for multiple falls on the day of presentation. CT head and cervical spine negative. Trauma workup revealing R lateral 7th rib fracture. Vitals significant for orthostatic hypotension.  On neurological exam patient bradykinetic, bradyphrenic with cogwheel rigidity and resting tremor.      Recommendations:   - continue cardiac monitoring  - sinemet to 25/100 mg q6   - droxidopa 100mg TID, check orthostatics 24hrs after starting and daily  - continue compression stocking, abdominal binder   - start Nuplazid 10mg PO qhs for agitation/hallucinations   - Haldol 1 mg PRN as needed for agitation 64M history of Parkinson's disease (w/ mild dementia), DM2, and HTN who was BIBEMS from [facility] for multiple falls on the day of presentation. CT head and cervical spine negative. Trauma workup revealing R lateral 7th rib fracture. Vitals significant for orthostatic hypotension.  On neurological exam patient bradykinetic, bradyphrenic with cogwheel rigidity and resting tremor.      Recommendations:   - continue cardiac monitoring  - sinemet to 25/100 mg q6   - droxidopa 100mg TID, check orthostatics 24hrs after starting and daily  - continue compression stocking, abdominal binder   - start Nuplazid 10mg PO qhs for agitation/hallucinations   - Haldol 1 mg PRN as needed for agitation

## 2020-05-22 NOTE — PROGRESS NOTE ADULT - SUBJECTIVE AND OBJECTIVE BOX
Patient is a 64y old  Male who presents with a chief complaint of Fall (20 May 2020 11:40)    HPI:  Patient is a 64 year old gentleman with a history of Parkinson's disease (w/ mild dementia), DM2, and HTN who was BIBEMS from [facility] for multiple falls on the day of presentation. Per patient history, he had 3 falls today. The first two were early in the morning, the last one was in the evening. He states that he had been standing for a few minutes before each fall and suddenly felt lightheaded/"woozy". He fell back and hit his head all three times. He denies any preceding confusion, chest pain, shortness of breath but does not endorse feeling as if his heart was racing throughout the day. He states after the fall he was able to get back up right away with no loss of consciousness or post-fall confusion. He denies any associated urinary/fecal incontinence. He states that he has been feeling palpitations intermittently for the past few months. ROS negative for any fevers/chills, vision changes, sore throat/runny nose, chest pain, abdominal pain, diarrhea/constipation. Patient reports has recently stared walking with a walker for stability for the past few months. Per [facility], patient has been having more frequent falls over the last few days and after two falls today they decided to have him sent in for reevaluation. They have also noticed a decline in his mental status where has been more confused and forgetful recently. [place] corroborates that there is no documentation of LOC or incontinence with falls today.    Patient initially presented to [facility]. BP on arrival was 90s/50s, HR 96, with lactate of 6.8; point of care ultrasound demonstrated small pericardial effusion. He was transferred to [place] for further workup. His BP on presentation to [facility] was 146/84, HR 94, lactate 2.2 after receiving 1L of LR. Trauma workup was negative except for non-displaced fracture of R lateral 7th rib. Patient was cleared by trauma service and will be admitted to medicine for further workup of recurrent falls. (18 May 2020 23:10)    PAST MEDICAL & SURGICAL HISTORY:  Mild dementia  Parkinson's disease  Diabetes 1.5, managed as type 2  No significant past surgical history    patient seen and examined independently on morning rounds    no overnight events--today was aggressive with unit staff-                PE:  GEN-NAD, AAOx2, +mild tremor, +cogwheel rigidity  PULM- Clear to auscultation bilaterally, fair air entry  CVS- +s1/s2 RRR no murmurs  GI- soft NT obese ND +bs, no rebound, no guarding  EXT- motor 5/5 bilateral ue and le        Labs:                        12.4   5.76  )-----------( 213      ( 22 May 2020 06:48 )             35.9     CBC Full  -  ( 22 May 2020 06:48 )  WBC Count : 5.76 K/uL  RBC Count : 3.99 M/uL  Hemoglobin : 12.4 g/dL  Hematocrit : 35.9 %  Platelet Count - Automated : 213 K/uL  Mean Cell Volume : 90.0 fL  Mean Cell Hemoglobin : 31.1 pg  Mean Cell Hemoglobin Concentration : 34.5 g/dL  Auto Neutrophil # : 4.02 K/uL  Auto Lymphocyte # : 1.01 K/uL  Auto Monocyte # : 0.50 K/uL  Auto Eosinophil # : 0.17 K/uL  Auto Basophil # : 0.04 K/uL  Auto Neutrophil % : 69.8 %  Auto Lymphocyte % : 17.5 %  Auto Monocyte % : 8.7 %  Auto Eosinophil % : 3.0 %  Auto Basophil % : 0.7 %      05-22    142  |  104  |  21<H>  ----------------------------<  99  4.5   |  25  |  0.9    Ca    9.0      22 May 2020 06:48  Mg     1.3     05-22    TPro  6.7  /  Alb  3.9  /  TBili  0.5  /  DBili  x   /  AST  22  /  ALT  <5  /  AlkPhos  100  05-22      Microbiology:      Vital Signs Last 24 Hrs  T(C): 37.3 (22 May 2020 13:02), Max: 37.3 (22 May 2020 13:02)  T(F): 99.2 (22 May 2020 13:02), Max: 99.2 (22 May 2020 13:02)  HR: 83 (22 May 2020 13:02) (76 - 85)  BP: 122/78 (22 May 2020 13:02) (122/78 - 198/97)  BP(mean): --  RR: 19 (22 May 2020 13:02) (19 - 20)  SpO2: --    I&O's Summary    21 May 2020 07:01  -  22 May 2020 07:00  --------------------------------------------------------  IN: 300 mL / OUT: 0 mL / NET: 300 mL    22 May 2020 07:01  -  22 May 2020 15:20  --------------------------------------------------------  IN: 400 mL / OUT: 200 mL / NET: 200 mL

## 2020-05-22 NOTE — BEHAVIORAL HEALTH ASSESSMENT NOTE - NSBHCHARTREVIEWVS_PSY_A_CORE FT
Vital Signs Last 24 Hrs  T(C): 37.3 (22 May 2020 13:02), Max: 37.3 (22 May 2020 13:02)  T(F): 99.2 (22 May 2020 13:02), Max: 99.2 (22 May 2020 13:02)  HR: 83 (22 May 2020 13:02) (76 - 85)  BP: 122/78 (22 May 2020 13:02) (122/78 - 198/97)  BP(mean): --  RR: 19 (22 May 2020 13:02) (19 - 20)  SpO2: --

## 2020-05-22 NOTE — BEHAVIORAL HEALTH ASSESSMENT NOTE - NSBHCONSULTMEDS_PSY_A_CORE FT
None for now , may consider Celexa for agitation in dementia on outpatient basis None for now , may consider Celexa for agitation in dementia on outpatient basis    Consider Gabapentin in 3 divided doses for anxiety and agitation for now , please discuss with family

## 2020-05-22 NOTE — PROGRESS NOTE ADULT - ASSESSMENT
a/p:    #mechanical fall- severe orthostatics (>100 mmhg drop in systolic bp upon standing) with worsening PD symptoms and mild LUBNA  -cont gentle ivf hydration (also monitor daily ck--improving rhabdomyolysis from fall---ck 1800--->187 today)  -fall appears mechanical --fall precautions and PT reeval   -neurology following  -cont droxydopa and monitor orthostasis daily- if remains severely orthostatic after 48 hrs will increase dose   -slow position change- compression stocking and abdominal binder  -increase dose sinemet 25/100 q6hr  -ECHO  -monitor electrolytes   -covid negative/ bcx negative x 2 (no underlying infectious etiology)    #Parkinsons Disease  -neurology eval appreciated  -ct head noncontrast   -continue sinemet---increased dose 2/2 worsening symptoms  -fall precautions  -psych consult to assist with agitation/hallucinations/medication regimen    #hypomagnesemia--suppl mg and f/u electrolytes    #DVT/GI ppx  guarded prognosis  FULL CODE--d/w daughter Mandi and will find living will and read through and d/w brother but for now remains full code-

## 2020-05-22 NOTE — PROGRESS NOTE ADULT - ASSESSMENT
64 year old gentleman with a history of Parkinson's disease (w/ mild dementia), DM2, and HTN who was BIBEMS from [facility] for multiple falls on the day of presentation, associated with a subacute decline in mental and functional status per nursing home report. Admitted for  recurrent  Falls for to be orthostatic Posistive likely secondary to dysautonomia pt is on timolol eye drops. Symptoms are asociated with takes Florinef 0.1mg PO.  MAy benifit for midodrine. CT head and cervical spine negative. Trauma workup revealing R lateral 7th rib fracture. Vitals significant for orthostatic hypotension.  On neurological exam patient bradykinetic, bradyphrenic with cogwheel rigidity and resting tremor.  s/p 3 L NS, BUN and LUBNA improved        IMPRESSION  Recurrent Falls  complicated  by  Acute R lateral 7th rib fracture  Sever Orthostatic Hypotension  Secondary to Autonomic Dysfunction  HO Parkinson Dementia wit Psychotic Features   HO HTN   HO DM2        Plan    -Continue  sinemet to 25/100 mg q6   - Remains Orthostatic  -Holding  fludrocortisone, midodrine   -Continue  droxidopa 100mg TID for neurogenic If still orthostatic  will increase to 200mg TID   - orthostatics 24hrs   - continue compression stocking, abdominal binder   - continue with  compression stocking and abdominal binder   - Continue with  aspirin and statin  - Holding  Seroquel   -Start Nuplazid 10mg PO qhs for agitation/hallucinations (unable to start inpatient)  -haldol 1 mg PRN as needed for agitation  -track CK daily last level 351   - Continue  Bowel Regimen   - Family confirming legal documents        Electrolyte Imbalances: Correct as needed  []  Hyponatremia   /   Hypernatremia  []   []  Hypokalemia   /   Hyperkalemia  []   []  Hypochlorhydria   /    Hypochlorhydria  []   []  Hypomagnesemia   /   Hypermagnesemia  []   []  Hypophosphatemia   /   Hyperphosphatemia  []       GI ppx:                                   [] Not indicated   /   [] Pantoprazole 40mg PO Daily    DVT ppx:  [] Not indicated / [x] Heparin 5000mg SubQ / [] Lovenox 40mg SubQ / [] SCDs    Feeds:   [x] PO  |  [] IVF    Activity:  [X] Assisatnce needed   [X] Increase as Tolerated  /  [] OOB w/ assist  /  [] Bed Rest    Weight 88.5kg      DISPO:  Patient to be discharged when condition(s) optimized.  [ ] From Home     [ x] NH/SNF   [ ] 4A Rehab  [ ] Detox Clinic  [X] Plan Discussion with patient and/or family.  [X] Discussed Case and Plan with the Medical Attending.    CODE STATUS  [X] FULL   /    [] DNR

## 2020-05-22 NOTE — BEHAVIORAL HEALTH ASSESSMENT NOTE - NSBHCHARTREVIEWLAB_PSY_A_CORE FT
12.4   5.76  )-----------( 213      ( 22 May 2020 06:48 )             35.9    05-22    142  |  104  |  21<H>  ----------------------------<  99  4.5   |  25  |  0.9    Ca    9.0      22 May 2020 06:48  Mg     1.3     05-22    TPro  6.7  /  Alb  3.9  /  TBili  0.5  /  DBili  x   /  AST  22  /  ALT  <5  /  AlkPhos  100  05-22

## 2020-05-23 LAB
ANION GAP SERPL CALC-SCNC: 14 MMOL/L — SIGNIFICANT CHANGE UP (ref 7–14)
BASOPHILS # BLD AUTO: 0.04 K/UL — SIGNIFICANT CHANGE UP (ref 0–0.2)
BASOPHILS NFR BLD AUTO: 0.9 % — SIGNIFICANT CHANGE UP (ref 0–1)
BUN SERPL-MCNC: 24 MG/DL — HIGH (ref 10–20)
CALCIUM SERPL-MCNC: 8.9 MG/DL — SIGNIFICANT CHANGE UP (ref 8.5–10.1)
CHLORIDE SERPL-SCNC: 104 MMOL/L — SIGNIFICANT CHANGE UP (ref 98–110)
CO2 SERPL-SCNC: 23 MMOL/L — SIGNIFICANT CHANGE UP (ref 17–32)
CREAT SERPL-MCNC: 0.9 MG/DL — SIGNIFICANT CHANGE UP (ref 0.7–1.5)
EOSINOPHIL # BLD AUTO: 0.17 K/UL — SIGNIFICANT CHANGE UP (ref 0–0.7)
EOSINOPHIL NFR BLD AUTO: 3.7 % — SIGNIFICANT CHANGE UP (ref 0–8)
GLUCOSE BLDC GLUCOMTR-MCNC: 115 MG/DL — HIGH (ref 70–99)
GLUCOSE BLDC GLUCOMTR-MCNC: 80 MG/DL — SIGNIFICANT CHANGE UP (ref 70–99)
GLUCOSE BLDC GLUCOMTR-MCNC: 92 MG/DL — SIGNIFICANT CHANGE UP (ref 70–99)
GLUCOSE BLDC GLUCOMTR-MCNC: 98 MG/DL — SIGNIFICANT CHANGE UP (ref 70–99)
GLUCOSE SERPL-MCNC: 82 MG/DL — SIGNIFICANT CHANGE UP (ref 70–99)
HCT VFR BLD CALC: 31.1 % — LOW (ref 42–52)
HGB BLD-MCNC: 11 G/DL — LOW (ref 14–18)
IMM GRANULOCYTES NFR BLD AUTO: 0.2 % — SIGNIFICANT CHANGE UP (ref 0.1–0.3)
LYMPHOCYTES # BLD AUTO: 1.46 K/UL — SIGNIFICANT CHANGE UP (ref 1.2–3.4)
LYMPHOCYTES # BLD AUTO: 31.6 % — SIGNIFICANT CHANGE UP (ref 20.5–51.1)
MAGNESIUM SERPL-MCNC: 1.7 MG/DL — LOW (ref 1.8–2.4)
MCHC RBC-ENTMCNC: 32 PG — HIGH (ref 27–31)
MCHC RBC-ENTMCNC: 35.4 G/DL — SIGNIFICANT CHANGE UP (ref 32–37)
MCV RBC AUTO: 90.4 FL — SIGNIFICANT CHANGE UP (ref 80–94)
MONOCYTES # BLD AUTO: 0.4 K/UL — SIGNIFICANT CHANGE UP (ref 0.1–0.6)
MONOCYTES NFR BLD AUTO: 8.7 % — SIGNIFICANT CHANGE UP (ref 1.7–9.3)
NEUTROPHILS # BLD AUTO: 2.54 K/UL — SIGNIFICANT CHANGE UP (ref 1.4–6.5)
NEUTROPHILS NFR BLD AUTO: 54.9 % — SIGNIFICANT CHANGE UP (ref 42.2–75.2)
NRBC # BLD: 0 /100 WBCS — SIGNIFICANT CHANGE UP (ref 0–0)
PLATELET # BLD AUTO: 200 K/UL — SIGNIFICANT CHANGE UP (ref 130–400)
POTASSIUM SERPL-MCNC: 3.8 MMOL/L — SIGNIFICANT CHANGE UP (ref 3.5–5)
POTASSIUM SERPL-SCNC: 3.8 MMOL/L — SIGNIFICANT CHANGE UP (ref 3.5–5)
RBC # BLD: 3.44 M/UL — LOW (ref 4.7–6.1)
RBC # FLD: 12.3 % — SIGNIFICANT CHANGE UP (ref 11.5–14.5)
SODIUM SERPL-SCNC: 141 MMOL/L — SIGNIFICANT CHANGE UP (ref 135–146)
WBC # BLD: 4.62 K/UL — LOW (ref 4.8–10.8)
WBC # FLD AUTO: 4.62 K/UL — LOW (ref 4.8–10.8)

## 2020-05-23 PROCEDURE — 99233 SBSQ HOSP IP/OBS HIGH 50: CPT

## 2020-05-23 RX ORDER — MAGNESIUM OXIDE 400 MG ORAL TABLET 241.3 MG
400 TABLET ORAL
Refills: 0 | Status: DISCONTINUED | OUTPATIENT
Start: 2020-05-23 | End: 2020-05-27

## 2020-05-23 RX ORDER — HALOPERIDOL DECANOATE 100 MG/ML
5 INJECTION INTRAMUSCULAR ONCE
Refills: 0 | Status: COMPLETED | OUTPATIENT
Start: 2020-05-23 | End: 2020-05-23

## 2020-05-23 RX ADMIN — DROXIDOPA 100 MILLIGRAM(S): 100 CAPSULE ORAL at 06:06

## 2020-05-23 RX ADMIN — LATANOPROST 1 DROP(S): 0.05 SOLUTION/ DROPS OPHTHALMIC; TOPICAL at 22:34

## 2020-05-23 RX ADMIN — DROXIDOPA 100 MILLIGRAM(S): 100 CAPSULE ORAL at 18:06

## 2020-05-23 RX ADMIN — CARBIDOPA AND LEVODOPA 1 TABLET(S): 25; 100 TABLET ORAL at 05:17

## 2020-05-23 RX ADMIN — DROXIDOPA 100 MILLIGRAM(S): 100 CAPSULE ORAL at 13:40

## 2020-05-23 RX ADMIN — Medication 81 MILLIGRAM(S): at 13:24

## 2020-05-23 RX ADMIN — SENNA PLUS 2 TABLET(S): 8.6 TABLET ORAL at 22:34

## 2020-05-23 RX ADMIN — HALOPERIDOL DECANOATE 5 MILLIGRAM(S): 100 INJECTION INTRAMUSCULAR at 00:21

## 2020-05-23 RX ADMIN — CARBIDOPA AND LEVODOPA 1 TABLET(S): 25; 100 TABLET ORAL at 13:24

## 2020-05-23 RX ADMIN — Medication 1 MILLIGRAM(S): at 14:26

## 2020-05-23 RX ADMIN — HEPARIN SODIUM 5000 UNIT(S): 5000 INJECTION INTRAVENOUS; SUBCUTANEOUS at 22:34

## 2020-05-23 RX ADMIN — PIMAVANSERIN TARTRATE 10 MILLIGRAM(S): 10 TABLET, COATED ORAL at 22:35

## 2020-05-23 RX ADMIN — Medication 1 DROP(S): at 22:34

## 2020-05-23 RX ADMIN — ATORVASTATIN CALCIUM 20 MILLIGRAM(S): 80 TABLET, FILM COATED ORAL at 22:34

## 2020-05-23 RX ADMIN — CARBIDOPA AND LEVODOPA 1 TABLET(S): 25; 100 TABLET ORAL at 18:06

## 2020-05-23 RX ADMIN — HEPARIN SODIUM 5000 UNIT(S): 5000 INJECTION INTRAVENOUS; SUBCUTANEOUS at 05:17

## 2020-05-23 RX ADMIN — FAMOTIDINE 20 MILLIGRAM(S): 10 INJECTION INTRAVENOUS at 13:24

## 2020-05-23 NOTE — PROGRESS NOTE ADULT - SUBJECTIVE AND OBJECTIVE BOX
MAYRA GARDNER  64y Male    INTERVAL HPI/OVERNIGHT EVENTS:    Pt sleeping comfortably - arousable.    T(F): 97.8 (20 @ 04:29), Max: 99.2 (20 @ 13:02)  HR: 68 (20 @ 04:29) (68 - 83)  BP: 152/89 (20 @ 04:29) (122/78 - 152/89)  RR: 18 (20 @ 04:29) (18 - 19)  SpO2: 98% (20 @ 19:35) (98% - 98%) on RA    I&O's Summary    22 May 2020 07:01  -  23 May 2020 07:00  --------------------------------------------------------  IN: 400 mL / OUT: 200 mL / NET: 200 mL      Daily Weight in k.6 (23 May 2020 04:29)    CAPILLARY BLOOD GLUCOSE      POCT Blood Glucose.: 98 mg/dL (23 May 2020 11:24)  POCT Blood Glucose.: 92 mg/dL (23 May 2020 07:28)  POCT Blood Glucose.: 95 mg/dL (22 May 2020 21:30)  POCT Blood Glucose.: 114 mg/dL (22 May 2020 16:49)        PHYSICAL EXAM:  GENERAL: NAD  HEAD:  Normocephalic  EYES:  conjunctiva and sclera clear  ENMT: Moist mucous membranes  NECK: Supple, No JVD  NERVOUS SYSTEM:  sleepy but arousable  CHEST/LUNG: Clear to percussion bilaterally; No rales, rhonchi, wheezing  HEART: Regular rate and rhythm  ABDOMEN: Soft, Nontender, Nondistended  EXTREMITIES:   No edema, COREY stockings in place  SKIN: No rashes     Consultant(s) Notes Reviewed:  [x ] YES  [ ] NO  Care Discussed with Consultants/Other Providers [ x] YES  [ ] NO    MEDICATIONS  (STANDING):  aspirin enteric coated 81 milliGRAM(s) Oral daily  atorvastatin 20 milliGRAM(s) Oral at bedtime  carbidopa/levodopa  25/100 1 Tablet(s) Oral every 6 hours  droxidopa 100 milliGRAM(s) Oral three times a day  famotidine  Oral Tab/Cap - Peds 20 milliGRAM(s) Oral daily  heparin   Injectable 5000 Unit(s) SubCutaneous every 8 hours  insulin lispro (HumaLOG) corrective regimen sliding scale   SubCutaneous three times a day before meals  latanoprost 0.005% Ophthalmic Solution 1 Drop(s) Both EYES at bedtime  pimavanserin Tablet 10 milliGRAM(s) Oral at bedtime  senna 2 Tablet(s) Oral at bedtime  timolol 0.5% Solution 1 Drop(s) Both EYES at bedtime    MEDICATIONS  (PRN):  acetaminophen   Tablet .. 650 milliGRAM(s) Oral every 6 hours PRN Mild Pain (1 - 3), Moderate Pain (4 - 6)    Telemetry reviewed    LABS:                        11.0   4.62  )-----------( 200      ( 23 May 2020 06:00 )             31.1     05-    141  |  104  |  24<H>  ----------------------------<  82  3.8   |  23  |  0.9    Ca    8.9      23 May 2020 06:00  Mg     1.7     -    TPro  6.7  /  Alb  3.9  /  TBili  0.5  /  DBili  x   /  AST  22  /  ALT  <5  /  AlkPhos  100  05-      CARDIAC MARKERS ( 22 May 2020 06:48 )  x     / x     / 187 U/L / x     / x              RADIOLOGY & ADDITIONAL TESTS:    Imaging or report Personally Reviewed:  [ ] YES  [ ] NO    < from: CT Angio Chest Dissection Protocol (20 @ 19:33) >  IMPRESSION: No evidence of aortic aneurysm or dissection.    There is mild deformity of the right lateral seventh rib compatible with a nondisplaced fracture.         < end of copied text >        Case discussed with resident

## 2020-05-23 NOTE — PROGRESS NOTE ADULT - ASSESSMENT
63 y/o man with PMH of Parkinson's disease (w/ mild dementia), DM2, and HTN who was BIBEMS from [facility] for multiple falls on the day of presentation, associated with a subacute decline in mental and functional status per nursing home report.    1. s/p multiple falls likely due to severe orthostatic hypotension   (>100 mmHg drop in systolic bp upon standing) with worsening PD symptoms and LUBNA  - s/p IV hydration with resolution of LUBNA and rhabdomyolysis  - Neuro f/u appreciated  - now on droxidopa and check orthostatics daily  - if remains severely orthostatic will increase the dose   - fall precautions  - physical therapy  - slow position change  - continue compression stockings and abdominal binder  - guarded prognosis    2. Parkinson's Disease with altered mental status  - sinemet dose increased  - Psych f/u appreciated  - pimavanserin started on 5/22 PM and monitor response    3. DM type 2 - controlled    4. DVT prophylaxis    FULL CODE status

## 2020-05-23 NOTE — PROGRESS NOTE ADULT - SUBJECTIVE AND OBJECTIVE BOX
LENGTH OF HOSPITAL STAY: 5d      CHIEF COMPLAINT:   Patient is a 64y old  Male who presents with a chief complaint of Fall (23 May 2020 12:48)      OVER Past 24hrs:  The patient was seen and examined at bedside there were  is s/p IV ativan and sleeping at bedside. Arousal to mild stimulus.           PAST MEDICAL & SURGICAL HISTORY  PAST MEDICAL & SURGICAL HISTORY:  Mild dementia  Parkinson's disease  Diabetes 1.5, managed as type 2  No significant past surgical history        REVIEW OF SYSTEMS  Unable  to obtain    ALLERGIES:  No Known Allergies    MEDICATIONS:  STANDING MEDICATIONS  aspirin enteric coated 81 milliGRAM(s) Oral daily  atorvastatin 20 milliGRAM(s) Oral at bedtime  carbidopa/levodopa  25/100 1 Tablet(s) Oral every 6 hours  droxidopa 100 milliGRAM(s) Oral three times a day  famotidine  Oral Tab/Cap - Peds 20 milliGRAM(s) Oral daily  heparin   Injectable 5000 Unit(s) SubCutaneous every 8 hours  insulin lispro (HumaLOG) corrective regimen sliding scale   SubCutaneous three times a day before meals  latanoprost 0.005% Ophthalmic Solution 1 Drop(s) Both EYES at bedtime  magnesium oxide 400 milliGRAM(s) Oral three times a day with meals  pimavanserin Tablet 10 milliGRAM(s) Oral at bedtime  senna 2 Tablet(s) Oral at bedtime  timolol 0.5% Solution 1 Drop(s) Both EYES at bedtime    PRN MEDICATIONS  acetaminophen   Tablet .. 650 milliGRAM(s) Oral every 6 hours PRN    VITALS:   T(F): 96.5  HR: 69  BP: 194/99  RR: 18  SpO2: --    PHYSICAL EXAM:  General: No acute distress.   interactive, nonfocal. Elderly   male with abdominal binder in bed    HEENT: Pupils, reactive to light     PULM: Clear to auscultation bilaterally, no significant sputum production.    CVS: Regular rate and rhythm, no murmurs, rubs, or gallops.    GI: Soft, nondistended, nontender, no masses.    MSK: No edema, nontender. Silver stockings on LE, Cogwheel rigidity     SKIN: Warm and well perfused, no rashes noted.    PSYCH: AAO1-2    NEURO: Non  focal     LABS:                        11.0   4.62  )-----------( 200      ( 23 May 2020 06:00 )             31.1     05-23    141  |  104  |  24<H>  ----------------------------<  82  3.8   |  23  |  0.9    Ca    8.9      23 May 2020 06:00  Mg     1.7     05-    TPro  6.7  /  Alb  3.9  /  TBili  0.5  /  DBili  x   /  AST  22  /  ALT  <5  /  AlkPhos  100  05-      Urinalysis Basic - ( 22 May 2020 22:03 )    Color: Yellow / Appearance: Clear / S.022 / pH: x  Gluc: x / Ketone: Trace  / Bili: Negative / Urobili: 3 mg/dL   Blood: x / Protein: Trace / Nitrite: Negative   Leuk Esterase: Negative / RBC: x / WBC x   Sq Epi: x / Non Sq Epi: x / Bacteria: x            CARDIAC MARKERS ( 22 May 2020 06:48 )  x     / x     / 187 U/L / x     / x LENGTH OF HOSPITAL STAY: 5d      CHIEF COMPLAINT:   Patient is a 64y old  Male who presents with a chief complaint of Fall (23 May 2020 12:48)      OVER Past 24hrs:  The patient was seen and examined at bedside there were  is s/p IV ativan and sleeping at bedside. Arousal to mild stimulus.           PAST MEDICAL & SURGICAL HISTORY  PAST MEDICAL & SURGICAL HISTORY:  Mild dementia  Parkinson's disease  Diabetes 1.5, managed as type 2  No significant past surgical history        REVIEW OF SYSTEMS  Unable  to obtain    ALLERGIES:  No Known Allergies    MEDICATIONS:  STANDING MEDICATIONS  aspirin enteric coated 81 milliGRAM(s) Oral daily  atorvastatin 20 milliGRAM(s) Oral at bedtime  carbidopa/levodopa  25/100 1 Tablet(s) Oral every 6 hours  droxidopa 100 milliGRAM(s) Oral three times a day  famotidine  Oral Tab/Cap - Peds 20 milliGRAM(s) Oral daily  heparin   Injectable 5000 Unit(s) SubCutaneous every 8 hours  insulin lispro (HumaLOG) corrective regimen sliding scale   SubCutaneous three times a day before meals  latanoprost 0.005% Ophthalmic Solution 1 Drop(s) Both EYES at bedtime  magnesium oxide 400 milliGRAM(s) Oral three times a day with meals  pimavanserin Tablet 10 milliGRAM(s) Oral at bedtime  senna 2 Tablet(s) Oral at bedtime  timolol 0.5% Solution 1 Drop(s) Both EYES at bedtime    PRN MEDICATIONS  acetaminophen   Tablet .. 650 milliGRAM(s) Oral every 6 hours PRN    VITALS:   T(F): 96.5  HR: 69  BP: 194/99  RR: 18  SpO2: --    PHYSICAL EXAM:  General: No acute distress.  . Elderly   male with abdominal binder in bed    HEENT: Pupils, reactive to light     PULM: Clear to auscultation bilaterally, no significant sputum production.    CVS: Regular rate and rhythm, no murmurs, rubs, or gallops.    GI: Soft, nondistended, nontender, no masses.    MSK: No edema, nontender. Silver stockings on LE, Cogwheel rigidity     SKIN: Warm and well perfused, no rashes noted.    PSYCH: AAO1-2    NEURO: Non  focal     LABS:                        11.0   4.62  )-----------( 200      ( 23 May 2020 06:00 )             31.1     05-23    141  |  104  |  24<H>  ----------------------------<  82  3.8   |  23  |  0.9    Ca    8.9      23 May 2020 06:00  Mg     1.7         TPro  6.7  /  Alb  3.9  /  TBili  0.5  /  DBili  x   /  AST  22  /  ALT  <5  /  AlkPhos  100        Urinalysis Basic - ( 22 May 2020 22:03 )    Color: Yellow / Appearance: Clear / S.022 / pH: x  Gluc: x / Ketone: Trace  / Bili: Negative / Urobili: 3 mg/dL   Blood: x / Protein: Trace / Nitrite: Negative   Leuk Esterase: Negative / RBC: x / WBC x   Sq Epi: x / Non Sq Epi: x / Bacteria: x            CARDIAC MARKERS ( 22 May 2020 06:48 )  x     / x     / 187 U/L / x     / x

## 2020-05-23 NOTE — PROGRESS NOTE ADULT - ASSESSMENT
64 year old gentleman with a history of Parkinson's disease (w/ mild dementia), DM2, and HTN who was BIBEMS from [facility] for multiple falls on the day of presentation, associated with a subacute decline in mental and functional status per nursing home report. Admitted for  recurrent  Falls for to be orthostatic Posistive likely secondary to dysautonomia pt is on timolol eye drops. Symptoms are asociated with takes Florinef 0.1mg PO.  MAy benifit for midodrine. CT head and cervical spine negative. Trauma workup revealing R lateral 7th rib fracture. Vitals significant for orthostatic hypotension.  On neurological exam patient bradykinetic, bradyphrenic with cogwheel rigidity and resting tremor.  s/p 3 L NS, BUN and LUBNA improved        IMPRESSION  Recurrent Falls  complicated  by  Acute R lateral 7th rib fracture  Sever Orthostatic Hypotension  Secondary to Autonomic Dysfunction  HO Parkinson Dementia wit Psychotic Features   HO HTN   HO DM2        Plan    -Continue  sinemet to 25/100 mg q6   - Remains Orthostatic  -Holding  fludrocortisone, midodrine   -Continue  droxidopa 100mg TID for neurogenic If still orthostatic  will increase to 200mg TID   - orthostatics 24hrs   - continue compression stocking, abdominal binder   - continue with  compression stocking and abdominal binder   - Continue with  aspirin and statin  - Please give   Seroquel  for agitation avoid ativan and haldol   -Start Nuplazid 10mg PO qhs for agitation/hallucinations (unable to start inpatient)  - track CK daily last level 351   - Continue  Bowel Regimen   - Family confirming legal documents        Electrolyte Imbalances: Correct as needed  []  Hyponatremia   /   Hypernatremia  []   []  Hypokalemia   /   Hyperkalemia  []   []  Hypochlorhydria   /    Hypochlorhydria  []   []  Hypomagnesemia   /   Hypermagnesemia  []   []  Hypophosphatemia   /   Hyperphosphatemia  []       GI ppx:                                   [] Not indicated   /   [] Pantoprazole 40mg PO Daily    DVT ppx:  [] Not indicated / [x] Heparin 5000mg SubQ / [] Lovenox 40mg SubQ / [] SCDs    Feeds:   [x] PO  |  [] IVF    Activity:  [X] Assisatnce needed   [X] Increase as Tolerated  /  [] OOB w/ assist  /  [] Bed Rest    Weight 88.5kg      DISPO:  Patient to be discharged when condition(s) optimized.  [ ] From Home     [ x] NH/SNF   [ ] 4A Rehab  [ ] Detox Clinic  [X] Plan Discussion with patient and/or family.  [X] Discussed Case and Plan with the Medical Attending.    CODE STATUS  [X] FULL   /    [] DNR

## 2020-05-24 LAB
ANION GAP SERPL CALC-SCNC: 16 MMOL/L — HIGH (ref 7–14)
BASOPHILS # BLD AUTO: 0.05 K/UL — SIGNIFICANT CHANGE UP (ref 0–0.2)
BASOPHILS NFR BLD AUTO: 0.8 % — SIGNIFICANT CHANGE UP (ref 0–1)
BUN SERPL-MCNC: 25 MG/DL — HIGH (ref 10–20)
CALCIUM SERPL-MCNC: 9.2 MG/DL — SIGNIFICANT CHANGE UP (ref 8.5–10.1)
CHLORIDE SERPL-SCNC: 105 MMOL/L — SIGNIFICANT CHANGE UP (ref 98–110)
CO2 SERPL-SCNC: 19 MMOL/L — SIGNIFICANT CHANGE UP (ref 17–32)
CREAT SERPL-MCNC: 1.1 MG/DL — SIGNIFICANT CHANGE UP (ref 0.7–1.5)
CULTURE RESULTS: SIGNIFICANT CHANGE UP
CULTURE RESULTS: SIGNIFICANT CHANGE UP
EOSINOPHIL # BLD AUTO: 0.19 K/UL — SIGNIFICANT CHANGE UP (ref 0–0.7)
EOSINOPHIL NFR BLD AUTO: 2.9 % — SIGNIFICANT CHANGE UP (ref 0–8)
GLUCOSE BLDC GLUCOMTR-MCNC: 109 MG/DL — HIGH (ref 70–99)
GLUCOSE BLDC GLUCOMTR-MCNC: 139 MG/DL — HIGH (ref 70–99)
GLUCOSE BLDC GLUCOMTR-MCNC: 142 MG/DL — HIGH (ref 70–99)
GLUCOSE BLDC GLUCOMTR-MCNC: 84 MG/DL — SIGNIFICANT CHANGE UP (ref 70–99)
GLUCOSE SERPL-MCNC: 93 MG/DL — SIGNIFICANT CHANGE UP (ref 70–99)
HCT VFR BLD CALC: 37.8 % — LOW (ref 42–52)
HGB BLD-MCNC: 13 G/DL — LOW (ref 14–18)
IMM GRANULOCYTES NFR BLD AUTO: 0.5 % — HIGH (ref 0.1–0.3)
LYMPHOCYTES # BLD AUTO: 0.88 K/UL — LOW (ref 1.2–3.4)
LYMPHOCYTES # BLD AUTO: 13.4 % — LOW (ref 20.5–51.1)
MAGNESIUM SERPL-MCNC: 1.8 MG/DL — SIGNIFICANT CHANGE UP (ref 1.8–2.4)
MCHC RBC-ENTMCNC: 31.3 PG — HIGH (ref 27–31)
MCHC RBC-ENTMCNC: 34.4 G/DL — SIGNIFICANT CHANGE UP (ref 32–37)
MCV RBC AUTO: 90.9 FL — SIGNIFICANT CHANGE UP (ref 80–94)
MONOCYTES # BLD AUTO: 0.57 K/UL — SIGNIFICANT CHANGE UP (ref 0.1–0.6)
MONOCYTES NFR BLD AUTO: 8.7 % — SIGNIFICANT CHANGE UP (ref 1.7–9.3)
NEUTROPHILS # BLD AUTO: 4.86 K/UL — SIGNIFICANT CHANGE UP (ref 1.4–6.5)
NEUTROPHILS NFR BLD AUTO: 73.7 % — SIGNIFICANT CHANGE UP (ref 42.2–75.2)
NRBC # BLD: 0 /100 WBCS — SIGNIFICANT CHANGE UP (ref 0–0)
PLATELET # BLD AUTO: 202 K/UL — SIGNIFICANT CHANGE UP (ref 130–400)
POTASSIUM SERPL-MCNC: 5.5 MMOL/L — HIGH (ref 3.5–5)
POTASSIUM SERPL-SCNC: 5.5 MMOL/L — HIGH (ref 3.5–5)
RBC # BLD: 4.16 M/UL — LOW (ref 4.7–6.1)
RBC # FLD: 12.6 % — SIGNIFICANT CHANGE UP (ref 11.5–14.5)
SODIUM SERPL-SCNC: 140 MMOL/L — SIGNIFICANT CHANGE UP (ref 135–146)
SPECIMEN SOURCE: SIGNIFICANT CHANGE UP
SPECIMEN SOURCE: SIGNIFICANT CHANGE UP
WBC # BLD: 6.58 K/UL — SIGNIFICANT CHANGE UP (ref 4.8–10.8)
WBC # FLD AUTO: 6.58 K/UL — SIGNIFICANT CHANGE UP (ref 4.8–10.8)

## 2020-05-24 PROCEDURE — 99233 SBSQ HOSP IP/OBS HIGH 50: CPT

## 2020-05-24 RX ADMIN — PIMAVANSERIN TARTRATE 10 MILLIGRAM(S): 10 TABLET, COATED ORAL at 22:46

## 2020-05-24 RX ADMIN — DROXIDOPA 100 MILLIGRAM(S): 100 CAPSULE ORAL at 11:33

## 2020-05-24 RX ADMIN — CARBIDOPA AND LEVODOPA 1 TABLET(S): 25; 100 TABLET ORAL at 01:21

## 2020-05-24 RX ADMIN — CARBIDOPA AND LEVODOPA 1 TABLET(S): 25; 100 TABLET ORAL at 11:32

## 2020-05-24 RX ADMIN — HEPARIN SODIUM 5000 UNIT(S): 5000 INJECTION INTRAVENOUS; SUBCUTANEOUS at 22:14

## 2020-05-24 RX ADMIN — MAGNESIUM OXIDE 400 MG ORAL TABLET 400 MILLIGRAM(S): 241.3 TABLET ORAL at 17:03

## 2020-05-24 RX ADMIN — Medication 81 MILLIGRAM(S): at 11:32

## 2020-05-24 RX ADMIN — Medication 1 DROP(S): at 22:16

## 2020-05-24 RX ADMIN — DROXIDOPA 100 MILLIGRAM(S): 100 CAPSULE ORAL at 06:01

## 2020-05-24 RX ADMIN — LATANOPROST 1 DROP(S): 0.05 SOLUTION/ DROPS OPHTHALMIC; TOPICAL at 22:15

## 2020-05-24 RX ADMIN — MAGNESIUM OXIDE 400 MG ORAL TABLET 400 MILLIGRAM(S): 241.3 TABLET ORAL at 08:04

## 2020-05-24 RX ADMIN — FAMOTIDINE 20 MILLIGRAM(S): 10 INJECTION INTRAVENOUS at 11:32

## 2020-05-24 RX ADMIN — CARBIDOPA AND LEVODOPA 1 TABLET(S): 25; 100 TABLET ORAL at 05:38

## 2020-05-24 RX ADMIN — HEPARIN SODIUM 5000 UNIT(S): 5000 INJECTION INTRAVENOUS; SUBCUTANEOUS at 05:38

## 2020-05-24 RX ADMIN — SENNA PLUS 2 TABLET(S): 8.6 TABLET ORAL at 22:15

## 2020-05-24 RX ADMIN — ATORVASTATIN CALCIUM 20 MILLIGRAM(S): 80 TABLET, FILM COATED ORAL at 22:15

## 2020-05-24 RX ADMIN — CARBIDOPA AND LEVODOPA 1 TABLET(S): 25; 100 TABLET ORAL at 17:03

## 2020-05-24 RX ADMIN — HEPARIN SODIUM 5000 UNIT(S): 5000 INJECTION INTRAVENOUS; SUBCUTANEOUS at 13:59

## 2020-05-24 RX ADMIN — DROXIDOPA 100 MILLIGRAM(S): 100 CAPSULE ORAL at 17:03

## 2020-05-24 RX ADMIN — MAGNESIUM OXIDE 400 MG ORAL TABLET 400 MILLIGRAM(S): 241.3 TABLET ORAL at 11:32

## 2020-05-24 NOTE — PROGRESS NOTE ADULT - SUBJECTIVE AND OBJECTIVE BOX
KENDRAMAYRA  64y Male    INTERVAL HPI/OVERNIGHT EVENTS:    Pt is awake - has no complaints.  Attempting to remove his telemetry lead sticker  Tele - no events and can be discontinued.    T(F): 96.4 (20 @ 05:07), Max: 96.5 (20 @ 12:55)  HR: 81 (20 @ 12:20) (69 - 81)  BP: 156/85 (20 @ 12:20) (91/58 - 194/99)  RR: 18 (20 @ 05:07) (18 - 18)  SpO2: --    I&O's Summary    24 May 2020 07:01  -  24 May 2020 12:26  --------------------------------------------------------  IN: 50 mL / OUT: 400 mL / NET: -350 mL      Daily Weight in k.7 (24 May 2020 05:07)    CAPILLARY BLOOD GLUCOSE      POCT Blood Glucose.: 139 mg/dL (24 May 2020 11:37)  POCT Blood Glucose.: 84 mg/dL (24 May 2020 07:41)  POCT Blood Glucose.: 80 mg/dL (23 May 2020 21:31)  POCT Blood Glucose.: 115 mg/dL (23 May 2020 16:31)        PHYSICAL EXAM:  GENERAL: NAD  HEAD:  Normocephalic  EYES:  conjunctiva and sclera clear  ENMT: Moist mucous membranes  NECK: Supple, No JVD  NERVOUS SYSTEM: awake, alert, fair concentration, follows a few commands  CHEST/LUNG: Clear to percussion bilaterally; No rales, rhonchi, wheezing  HEART: Regular rate and rhythm  ABDOMEN: Soft, Nontender, Nondistended  EXTREMITIES:  No edema       Consultant(s) Notes Reviewed:  [x ] YES  [ ] NO  Care Discussed with Consultants/Other Providers [ x] YES  [ ] NO    MEDICATIONS  (STANDING):  aspirin enteric coated 81 milliGRAM(s) Oral daily  atorvastatin 20 milliGRAM(s) Oral at bedtime  carbidopa/levodopa  25/100 1 Tablet(s) Oral every 6 hours  droxidopa 100 milliGRAM(s) Oral three times a day  famotidine  Oral Tab/Cap - Peds 20 milliGRAM(s) Oral daily  heparin   Injectable 5000 Unit(s) SubCutaneous every 8 hours  insulin lispro (HumaLOG) corrective regimen sliding scale   SubCutaneous three times a day before meals  latanoprost 0.005% Ophthalmic Solution 1 Drop(s) Both EYES at bedtime  magnesium oxide 400 milliGRAM(s) Oral three times a day with meals  pimavanserin Tablet 10 milliGRAM(s) Oral at bedtime  senna 2 Tablet(s) Oral at bedtime  timolol 0.5% Solution 1 Drop(s) Both EYES at bedtime    MEDICATIONS  (PRN):  acetaminophen   Tablet .. 650 milliGRAM(s) Oral every 6 hours PRN Mild Pain (1 - 3), Moderate Pain (4 - 6)        LABS:                        13.0   6.58  )-----------( 202      ( 24 May 2020 05:32 )             37.8     05-24    140  |  105  |  25<H>  ----------------------------<  93  5.5 - hemolyzed<H>   |  19  |  1.1    Ca    9.2      24 May 2020 05:32  Mg     1.8     05-24                  Case discussed with resident    Care discussed with pt's family

## 2020-05-24 NOTE — PROGRESS NOTE ADULT - ASSESSMENT
65 y/o man with PMH of Parkinson's disease (w/ mild dementia), DM2, and HTN who was BIBEMS from [facility] for multiple falls on the day of presentation, associated with a subacute decline in mental and functional status per nursing home report.    1. s/p multiple falls likely due to severe orthostatic hypotension   (>100 mmHg drop in systolic bp upon standing) with worsening PD symptoms and LUBNA  - s/p IV hydration with resolution of LUBNA and rhabdomyolysis  - Neuro f/u appreciated  - now on droxidopa and check orthostatics daily  - if remains severely orthostatic will increase the dose   - fall precautions  - physical therapy  - slow position change  - continue compression stockings and abdominal binder  - guarded prognosis    2. Parkinson's Disease with altered mental status (psychosis)  - sinemet dose increased  - Psych and Neuro f/u appreciated  - pimavanserin started on 5/22 PM and monitor response  - avoid haldol and ativan    3. DM type 2 - controlled    4. DVT prophylaxis    FULL CODE status       PROGRESS NOTE HANDOFF    Pending: orthostatics daily    Family discussion: spoke to pt's daughter Mandi today    Disposition: from [facility]

## 2020-05-25 LAB
ANION GAP SERPL CALC-SCNC: 12 MMOL/L — SIGNIFICANT CHANGE UP (ref 7–14)
BASOPHILS # BLD AUTO: 0.04 K/UL — SIGNIFICANT CHANGE UP (ref 0–0.2)
BASOPHILS NFR BLD AUTO: 0.6 % — SIGNIFICANT CHANGE UP (ref 0–1)
BUN SERPL-MCNC: 22 MG/DL — HIGH (ref 10–20)
CALCIUM SERPL-MCNC: 9.3 MG/DL — SIGNIFICANT CHANGE UP (ref 8.5–10.1)
CHLORIDE SERPL-SCNC: 103 MMOL/L — SIGNIFICANT CHANGE UP (ref 98–110)
CO2 SERPL-SCNC: 25 MMOL/L — SIGNIFICANT CHANGE UP (ref 17–32)
CREAT SERPL-MCNC: 0.9 MG/DL — SIGNIFICANT CHANGE UP (ref 0.7–1.5)
EOSINOPHIL # BLD AUTO: 0.16 K/UL — SIGNIFICANT CHANGE UP (ref 0–0.7)
EOSINOPHIL NFR BLD AUTO: 2.4 % — SIGNIFICANT CHANGE UP (ref 0–8)
GLUCOSE BLDC GLUCOMTR-MCNC: 107 MG/DL — HIGH (ref 70–99)
GLUCOSE BLDC GLUCOMTR-MCNC: 108 MG/DL — HIGH (ref 70–99)
GLUCOSE BLDC GLUCOMTR-MCNC: 114 MG/DL — HIGH (ref 70–99)
GLUCOSE BLDC GLUCOMTR-MCNC: 121 MG/DL — HIGH (ref 70–99)
GLUCOSE SERPL-MCNC: 110 MG/DL — HIGH (ref 70–99)
HCT VFR BLD CALC: 36.4 % — LOW (ref 42–52)
HGB BLD-MCNC: 12.7 G/DL — LOW (ref 14–18)
IMM GRANULOCYTES NFR BLD AUTO: 0.1 % — SIGNIFICANT CHANGE UP (ref 0.1–0.3)
LYMPHOCYTES # BLD AUTO: 1.11 K/UL — LOW (ref 1.2–3.4)
LYMPHOCYTES # BLD AUTO: 16.6 % — LOW (ref 20.5–51.1)
MAGNESIUM SERPL-MCNC: 1.6 MG/DL — LOW (ref 1.8–2.4)
MCHC RBC-ENTMCNC: 31.2 PG — HIGH (ref 27–31)
MCHC RBC-ENTMCNC: 34.9 G/DL — SIGNIFICANT CHANGE UP (ref 32–37)
MCV RBC AUTO: 89.4 FL — SIGNIFICANT CHANGE UP (ref 80–94)
MONOCYTES # BLD AUTO: 0.56 K/UL — SIGNIFICANT CHANGE UP (ref 0.1–0.6)
MONOCYTES NFR BLD AUTO: 8.4 % — SIGNIFICANT CHANGE UP (ref 1.7–9.3)
NEUTROPHILS # BLD AUTO: 4.79 K/UL — SIGNIFICANT CHANGE UP (ref 1.4–6.5)
NEUTROPHILS NFR BLD AUTO: 71.9 % — SIGNIFICANT CHANGE UP (ref 42.2–75.2)
NRBC # BLD: 0 /100 WBCS — SIGNIFICANT CHANGE UP (ref 0–0)
PLATELET # BLD AUTO: 214 K/UL — SIGNIFICANT CHANGE UP (ref 130–400)
POTASSIUM SERPL-MCNC: 3.8 MMOL/L — SIGNIFICANT CHANGE UP (ref 3.5–5)
POTASSIUM SERPL-SCNC: 3.8 MMOL/L — SIGNIFICANT CHANGE UP (ref 3.5–5)
RBC # BLD: 4.07 M/UL — LOW (ref 4.7–6.1)
RBC # FLD: 12.2 % — SIGNIFICANT CHANGE UP (ref 11.5–14.5)
SODIUM SERPL-SCNC: 140 MMOL/L — SIGNIFICANT CHANGE UP (ref 135–146)
WBC # BLD: 6.67 K/UL — SIGNIFICANT CHANGE UP (ref 4.8–10.8)
WBC # FLD AUTO: 6.67 K/UL — SIGNIFICANT CHANGE UP (ref 4.8–10.8)

## 2020-05-25 PROCEDURE — 99233 SBSQ HOSP IP/OBS HIGH 50: CPT

## 2020-05-25 RX ORDER — HYDRALAZINE HCL 50 MG
25 TABLET ORAL THREE TIMES A DAY
Refills: 0 | Status: DISCONTINUED | OUTPATIENT
Start: 2020-05-25 | End: 2020-05-25

## 2020-05-25 RX ORDER — AMLODIPINE BESYLATE 2.5 MG/1
5 TABLET ORAL AT BEDTIME
Refills: 0 | Status: DISCONTINUED | OUTPATIENT
Start: 2020-05-25 | End: 2020-05-26

## 2020-05-25 RX ORDER — AMLODIPINE BESYLATE 2.5 MG/1
5 TABLET ORAL DAILY
Refills: 0 | Status: DISCONTINUED | OUTPATIENT
Start: 2020-05-25 | End: 2020-05-25

## 2020-05-25 RX ORDER — MAGNESIUM SULFATE 500 MG/ML
2 VIAL (ML) INJECTION ONCE
Refills: 0 | Status: COMPLETED | OUTPATIENT
Start: 2020-05-25 | End: 2020-05-25

## 2020-05-25 RX ADMIN — ATORVASTATIN CALCIUM 20 MILLIGRAM(S): 80 TABLET, FILM COATED ORAL at 21:17

## 2020-05-25 RX ADMIN — HEPARIN SODIUM 5000 UNIT(S): 5000 INJECTION INTRAVENOUS; SUBCUTANEOUS at 05:57

## 2020-05-25 RX ADMIN — DROXIDOPA 100 MILLIGRAM(S): 100 CAPSULE ORAL at 17:34

## 2020-05-25 RX ADMIN — SENNA PLUS 2 TABLET(S): 8.6 TABLET ORAL at 21:16

## 2020-05-25 RX ADMIN — DROXIDOPA 100 MILLIGRAM(S): 100 CAPSULE ORAL at 05:56

## 2020-05-25 RX ADMIN — Medication 50 GRAM(S): at 09:31

## 2020-05-25 RX ADMIN — AMLODIPINE BESYLATE 5 MILLIGRAM(S): 2.5 TABLET ORAL at 06:47

## 2020-05-25 RX ADMIN — AMLODIPINE BESYLATE 5 MILLIGRAM(S): 2.5 TABLET ORAL at 21:16

## 2020-05-25 RX ADMIN — PIMAVANSERIN TARTRATE 10 MILLIGRAM(S): 10 TABLET, COATED ORAL at 21:19

## 2020-05-25 RX ADMIN — Medication 1 DROP(S): at 21:18

## 2020-05-25 RX ADMIN — DROXIDOPA 100 MILLIGRAM(S): 100 CAPSULE ORAL at 13:38

## 2020-05-25 RX ADMIN — MAGNESIUM OXIDE 400 MG ORAL TABLET 400 MILLIGRAM(S): 241.3 TABLET ORAL at 12:51

## 2020-05-25 RX ADMIN — CARBIDOPA AND LEVODOPA 1 TABLET(S): 25; 100 TABLET ORAL at 05:56

## 2020-05-25 RX ADMIN — MAGNESIUM OXIDE 400 MG ORAL TABLET 400 MILLIGRAM(S): 241.3 TABLET ORAL at 17:33

## 2020-05-25 RX ADMIN — CARBIDOPA AND LEVODOPA 1 TABLET(S): 25; 100 TABLET ORAL at 17:33

## 2020-05-25 RX ADMIN — HEPARIN SODIUM 5000 UNIT(S): 5000 INJECTION INTRAVENOUS; SUBCUTANEOUS at 13:00

## 2020-05-25 RX ADMIN — CARBIDOPA AND LEVODOPA 1 TABLET(S): 25; 100 TABLET ORAL at 12:51

## 2020-05-25 RX ADMIN — CARBIDOPA AND LEVODOPA 1 TABLET(S): 25; 100 TABLET ORAL at 23:24

## 2020-05-25 RX ADMIN — MAGNESIUM OXIDE 400 MG ORAL TABLET 400 MILLIGRAM(S): 241.3 TABLET ORAL at 07:58

## 2020-05-25 RX ADMIN — Medication 81 MILLIGRAM(S): at 12:51

## 2020-05-25 RX ADMIN — CARBIDOPA AND LEVODOPA 1 TABLET(S): 25; 100 TABLET ORAL at 00:24

## 2020-05-25 RX ADMIN — HEPARIN SODIUM 5000 UNIT(S): 5000 INJECTION INTRAVENOUS; SUBCUTANEOUS at 21:17

## 2020-05-25 RX ADMIN — LATANOPROST 1 DROP(S): 0.05 SOLUTION/ DROPS OPHTHALMIC; TOPICAL at 21:18

## 2020-05-25 RX ADMIN — FAMOTIDINE 20 MILLIGRAM(S): 10 INJECTION INTRAVENOUS at 12:51

## 2020-05-25 RX ADMIN — Medication 25 MILLIGRAM(S): at 06:47

## 2020-05-25 NOTE — PROGRESS NOTE ADULT - ASSESSMENT
63 y/o man with PMH of Parkinson's disease (w/ mild dementia), DM2, and HTN who was BIBEMS from [facility] for multiple falls on the day of presentation, associated with a subacute decline in mental and functional status per nursing home report.    1. s/p multiple falls likely due to severe orthostatic hypotension   (>100 mmHg drop in systolic bp upon standing) with worsening PD symptoms and LUBNA  - s/p IV hydration with resolution of LUBNA and rhabdomyolysis  - Neuro f/u appreciated  - now on droxidopa and check orthostatics daily  - if remains severely orthostatic will increase the dose but now limited by supine HTN  - fall precautions  - physical therapy  - slow position change  - continue compression stockings and abdominal binder  - guarded prognosis    2. Parkinson's Disease with altered mental status (psychosis)  - sinemet dose increased  - Psych and Neuro f/u appreciated  - pimavanserin started on 5/22 PM and monitor response  - avoid haldol and ativan  - Neuro f/u    3. DM type 2 - controlled    4. Supine HTN  amlodipine 5mg at night and monitor  increase to 10mg if SBP remains elevated    5. DVT prophylaxis    FULL CODE status       PROGRESS NOTE HANDOFF    Pending: orthostatics daily, monitor BP, Neuro f/u    Disposition: from [place] 63 y/o man with PMH of Parkinson's disease (w/ mild dementia), DM2, and HTN who was BIBEMS from [facility] for multiple falls on the day of presentation, associated with a subacute decline in mental and functional status per nursing home report.    1. s/p multiple falls likely due to severe orthostatic hypotension   (>100 mmHg drop in systolic bp upon standing) with worsening PD symptoms and LUBNA  - s/p IV hydration with resolution of LUBNA and rhabdomyolysis  - Neuro f/u appreciated  - now on droxidopa and check orthostatics daily  - if remains severely orthostatic will increase the dose but now limited by supine HTN  - fall precautions  - physical therapy  - slow position change  - continue compression stockings and abdominal binder  - guarded prognosis    2. Parkinson's Disease with altered mental status (psychosis)  - sinemet dose increased  - Psych and Neuro f/u appreciated  - pimavanserin started on 5/22 PM and monitor response  - avoid haldol and ativan  - Neuro f/u    3. DM type 2 - controlled    4. Supine HTN  amlodipine 5mg at night and monitor  increase to 10mg if SBP remains elevated    5. DVT prophylaxis    6. Hypomagnesemia - repleted IV and PO today    FULL CODE status       PROGRESS NOTE HANDOFF    Pending: orthostatics daily, monitor BP, Neuro f/u    Disposition: from [facility]

## 2020-05-25 NOTE — PROGRESS NOTE ADULT - SUBJECTIVE AND OBJECTIVE BOX
MAYRA GARDNER  64y Male    INTERVAL HPI/OVERNIGHT EVENTS:    Pt is confused and has visual hallucinations.    T(F): 98.2 (20 @ 13:04), Max: 98.2 (20 @ 13:04)  HR: 82 (20 @ 13:04) (72 - 89)  BP: 168/77 (20 @ 13:04) (120/71 - 207/106)  RR: 18 (20 @ 13:04) (18 - 20)  SpO2: 97% (20 @ 19:35) (97% - 97%) on RA    I&O's Summary    24 May 2020 07:01  -  25 May 2020 07:00  --------------------------------------------------------  IN: 690 mL / OUT: 500 mL / NET: 190 mL      CAPILLARY BLOOD GLUCOSE      POCT Blood Glucose.: 114 mg/dL (25 May 2020 12:45)  POCT Blood Glucose.: 121 mg/dL (25 May 2020 07:30)  POCT Blood Glucose.: 109 mg/dL (24 May 2020 21:58)  POCT Blood Glucose.: 142 mg/dL (24 May 2020 16:37)        PHYSICAL EXAM:  GENERAL: NAD  HEAD:  Normocephalic  EYES:  conjunctiva and sclera clear  ENMT: Moist mucous membranes  NECK: Supple  NERVOUS SYSTEM:  Alert, awake, thinks he is at a , knows the  is   CHEST/LUNG: Clear to percussion bilaterally; No rales, rhonchi, wheezing  HEART: Regular rate and rhythm; + systolic murmur  ABDOMEN: Soft, Nontender, Nondistended; Bowel sounds present  EXTREMITIES:   No edema    Consultant(s) Notes Reviewed:  [x ] YES  [ ] NO  Care Discussed with Consultants/Other Providers [ x] YES  [ ] NO    MEDICATIONS  (STANDING):  amLODIPine   Tablet 5 milliGRAM(s) Oral at bedtime  aspirin enteric coated 81 milliGRAM(s) Oral daily  atorvastatin 20 milliGRAM(s) Oral at bedtime  carbidopa/levodopa  25/100 1 Tablet(s) Oral every 6 hours  droxidopa 100 milliGRAM(s) Oral three times a day  famotidine  Oral Tab/Cap - Peds 20 milliGRAM(s) Oral daily  heparin   Injectable 5000 Unit(s) SubCutaneous every 8 hours  insulin lispro (HumaLOG) corrective regimen sliding scale   SubCutaneous three times a day before meals  latanoprost 0.005% Ophthalmic Solution 1 Drop(s) Both EYES at bedtime  magnesium oxide 400 milliGRAM(s) Oral three times a day with meals  pimavanserin Tablet 10 milliGRAM(s) Oral at bedtime  senna 2 Tablet(s) Oral at bedtime  timolol 0.5% Solution 1 Drop(s) Both EYES at bedtime    MEDICATIONS  (PRN):  acetaminophen   Tablet .. 650 milliGRAM(s) Oral every 6 hours PRN Mild Pain (1 - 3), Moderate Pain (4 - 6)      LABS:                        12.7   6.67  )-----------( 214      ( 25 May 2020 05:34 )             36.4         140  |  103  |  22<H>  ----------------------------<  110<H>  3.8   |  25  |  0.9    Ca    9.3      25 May 2020 05:34  Mg     1.6     05-25              Case discussed with resident

## 2020-05-26 LAB
ANION GAP SERPL CALC-SCNC: 12 MMOL/L — SIGNIFICANT CHANGE UP (ref 7–14)
BASOPHILS # BLD AUTO: 0.05 K/UL — SIGNIFICANT CHANGE UP (ref 0–0.2)
BASOPHILS NFR BLD AUTO: 0.9 % — SIGNIFICANT CHANGE UP (ref 0–1)
BUN SERPL-MCNC: 19 MG/DL — SIGNIFICANT CHANGE UP (ref 10–20)
CALCIUM SERPL-MCNC: 9 MG/DL — SIGNIFICANT CHANGE UP (ref 8.5–10.1)
CHLORIDE SERPL-SCNC: 104 MMOL/L — SIGNIFICANT CHANGE UP (ref 98–110)
CO2 SERPL-SCNC: 25 MMOL/L — SIGNIFICANT CHANGE UP (ref 17–32)
CREAT SERPL-MCNC: 0.7 MG/DL — SIGNIFICANT CHANGE UP (ref 0.7–1.5)
EOSINOPHIL # BLD AUTO: 0.16 K/UL — SIGNIFICANT CHANGE UP (ref 0–0.7)
EOSINOPHIL NFR BLD AUTO: 2.9 % — SIGNIFICANT CHANGE UP (ref 0–8)
GLUCOSE BLDC GLUCOMTR-MCNC: 111 MG/DL — HIGH (ref 70–99)
GLUCOSE BLDC GLUCOMTR-MCNC: 129 MG/DL — HIGH (ref 70–99)
GLUCOSE BLDC GLUCOMTR-MCNC: 98 MG/DL — SIGNIFICANT CHANGE UP (ref 70–99)
GLUCOSE SERPL-MCNC: 100 MG/DL — HIGH (ref 70–99)
HCT VFR BLD CALC: 34.6 % — LOW (ref 42–52)
HGB BLD-MCNC: 12.4 G/DL — LOW (ref 14–18)
IMM GRANULOCYTES NFR BLD AUTO: 0.4 % — HIGH (ref 0.1–0.3)
LYMPHOCYTES # BLD AUTO: 1.11 K/UL — LOW (ref 1.2–3.4)
LYMPHOCYTES # BLD AUTO: 20.1 % — LOW (ref 20.5–51.1)
MAGNESIUM SERPL-MCNC: 1.7 MG/DL — LOW (ref 1.8–2.4)
MCHC RBC-ENTMCNC: 32.4 PG — HIGH (ref 27–31)
MCHC RBC-ENTMCNC: 35.8 G/DL — SIGNIFICANT CHANGE UP (ref 32–37)
MCV RBC AUTO: 90.3 FL — SIGNIFICANT CHANGE UP (ref 80–94)
MONOCYTES # BLD AUTO: 0.6 K/UL — SIGNIFICANT CHANGE UP (ref 0.1–0.6)
MONOCYTES NFR BLD AUTO: 10.9 % — HIGH (ref 1.7–9.3)
NEUTROPHILS # BLD AUTO: 3.58 K/UL — SIGNIFICANT CHANGE UP (ref 1.4–6.5)
NEUTROPHILS NFR BLD AUTO: 64.8 % — SIGNIFICANT CHANGE UP (ref 42.2–75.2)
NRBC # BLD: 0 /100 WBCS — SIGNIFICANT CHANGE UP (ref 0–0)
PLATELET # BLD AUTO: 207 K/UL — SIGNIFICANT CHANGE UP (ref 130–400)
POTASSIUM SERPL-MCNC: 3.9 MMOL/L — SIGNIFICANT CHANGE UP (ref 3.5–5)
POTASSIUM SERPL-SCNC: 3.9 MMOL/L — SIGNIFICANT CHANGE UP (ref 3.5–5)
RBC # BLD: 3.83 M/UL — LOW (ref 4.7–6.1)
RBC # FLD: 12.5 % — SIGNIFICANT CHANGE UP (ref 11.5–14.5)
SODIUM SERPL-SCNC: 141 MMOL/L — SIGNIFICANT CHANGE UP (ref 135–146)
WBC # BLD: 5.52 K/UL — SIGNIFICANT CHANGE UP (ref 4.8–10.8)
WBC # FLD AUTO: 5.52 K/UL — SIGNIFICANT CHANGE UP (ref 4.8–10.8)

## 2020-05-26 PROCEDURE — 99233 SBSQ HOSP IP/OBS HIGH 50: CPT

## 2020-05-26 PROCEDURE — 99221 1ST HOSP IP/OBS SF/LOW 40: CPT

## 2020-05-26 PROCEDURE — 99232 SBSQ HOSP IP/OBS MODERATE 35: CPT

## 2020-05-26 RX ORDER — PYRIDOSTIGMINE BROMIDE 60 MG/5ML
360 SOLUTION ORAL DAILY
Refills: 0 | Status: DISCONTINUED | OUTPATIENT
Start: 2020-05-26 | End: 2020-05-27

## 2020-05-26 RX ORDER — PYRIDOSTIGMINE BROMIDE 60 MG/5ML
180 SOLUTION ORAL AT BEDTIME
Refills: 0 | Status: DISCONTINUED | OUTPATIENT
Start: 2020-05-26 | End: 2020-05-26

## 2020-05-26 RX ORDER — DROXIDOPA 100 MG/1
100 CAPSULE ORAL
Refills: 0 | Status: DISCONTINUED | OUTPATIENT
Start: 2020-05-26 | End: 2020-05-27

## 2020-05-26 RX ORDER — BACITRACIN ZINC 500 UNIT/G
1 OINTMENT IN PACKET (EA) TOPICAL DAILY
Refills: 0 | Status: COMPLETED | OUTPATIENT
Start: 2020-05-26 | End: 2020-05-30

## 2020-05-26 RX ORDER — AMLODIPINE BESYLATE 2.5 MG/1
5 TABLET ORAL
Refills: 0 | Status: DISCONTINUED | OUTPATIENT
Start: 2020-05-26 | End: 2020-05-27

## 2020-05-26 RX ORDER — MAGNESIUM SULFATE 500 MG/ML
2 VIAL (ML) INJECTION ONCE
Refills: 0 | Status: COMPLETED | OUTPATIENT
Start: 2020-05-26 | End: 2020-05-26

## 2020-05-26 RX ADMIN — Medication 81 MILLIGRAM(S): at 11:51

## 2020-05-26 RX ADMIN — HEPARIN SODIUM 5000 UNIT(S): 5000 INJECTION INTRAVENOUS; SUBCUTANEOUS at 05:43

## 2020-05-26 RX ADMIN — CARBIDOPA AND LEVODOPA 1 TABLET(S): 25; 100 TABLET ORAL at 05:43

## 2020-05-26 RX ADMIN — Medication 1 DROP(S): at 22:16

## 2020-05-26 RX ADMIN — MAGNESIUM OXIDE 400 MG ORAL TABLET 400 MILLIGRAM(S): 241.3 TABLET ORAL at 11:51

## 2020-05-26 RX ADMIN — AMLODIPINE BESYLATE 5 MILLIGRAM(S): 2.5 TABLET ORAL at 16:41

## 2020-05-26 RX ADMIN — DROXIDOPA 100 MILLIGRAM(S): 100 CAPSULE ORAL at 11:57

## 2020-05-26 RX ADMIN — Medication 1 APPLICATION(S): at 11:52

## 2020-05-26 RX ADMIN — SENNA PLUS 2 TABLET(S): 8.6 TABLET ORAL at 22:17

## 2020-05-26 RX ADMIN — MAGNESIUM OXIDE 400 MG ORAL TABLET 400 MILLIGRAM(S): 241.3 TABLET ORAL at 16:40

## 2020-05-26 RX ADMIN — ATORVASTATIN CALCIUM 20 MILLIGRAM(S): 80 TABLET, FILM COATED ORAL at 22:17

## 2020-05-26 RX ADMIN — MAGNESIUM OXIDE 400 MG ORAL TABLET 400 MILLIGRAM(S): 241.3 TABLET ORAL at 07:49

## 2020-05-26 RX ADMIN — HEPARIN SODIUM 5000 UNIT(S): 5000 INJECTION INTRAVENOUS; SUBCUTANEOUS at 22:17

## 2020-05-26 RX ADMIN — DROXIDOPA 100 MILLIGRAM(S): 100 CAPSULE ORAL at 05:44

## 2020-05-26 RX ADMIN — CARBIDOPA AND LEVODOPA 1 TABLET(S): 25; 100 TABLET ORAL at 23:02

## 2020-05-26 RX ADMIN — FAMOTIDINE 20 MILLIGRAM(S): 10 INJECTION INTRAVENOUS at 11:51

## 2020-05-26 RX ADMIN — LATANOPROST 1 DROP(S): 0.05 SOLUTION/ DROPS OPHTHALMIC; TOPICAL at 22:16

## 2020-05-26 RX ADMIN — HEPARIN SODIUM 5000 UNIT(S): 5000 INJECTION INTRAVENOUS; SUBCUTANEOUS at 14:36

## 2020-05-26 RX ADMIN — Medication 50 GRAM(S): at 08:35

## 2020-05-26 RX ADMIN — CARBIDOPA AND LEVODOPA 1 TABLET(S): 25; 100 TABLET ORAL at 16:41

## 2020-05-26 RX ADMIN — DROXIDOPA 100 MILLIGRAM(S): 100 CAPSULE ORAL at 16:40

## 2020-05-26 RX ADMIN — CARBIDOPA AND LEVODOPA 1 TABLET(S): 25; 100 TABLET ORAL at 11:51

## 2020-05-26 NOTE — DIETITIAN INITIAL EVALUATION ADULT. - OTHER INFO
Pt admitted d/t fall, hypotension, and pericardial effusion. Pt has Parkinson's with mild dementia. Pt with AMS, having hallucinations. On 1:1 sit throughout this admission. No BMs recorded since admission, pt is on a bowel regimen. Po intake varies % throughout LOS per EMR. Per pt daughter, pt has a good appetite at baseline. Per daughter, pt had experienced wt loss over the last few years d/t forgetting to eat. However, since in assisted living facility, pt has put wt back on. Per daughter, last known UBW is within the 200 lbs range. Current admission wt ranging from 195-213 lbs throughout LOS per EMR which is consistent with reported wt hx. NKFA/intolerances. No food preferences r/t culture/Taoist. Pt takes MVI and lutein supplements pta. Also, sometimes drinks premier protein supplements pta when appetite is poor. Interested in oral nutrition supplements during this admission as well. Pt does not typically have any issues chewing/swallowing, however pt daughter notes that pt coughing sometimes after eating and is unsure if it is a swallowing related issues. Pt is tolerating current diet order/texture per EMR.

## 2020-05-26 NOTE — DIETITIAN INITIAL EVALUATION ADULT. - ADD RECOMMEND
continue current diet order, add Glucerna supplement BID, encourage po intake, provide assistance with meals PRN continue current diet order, add Glucerna supplement BID, encourage po intake, provide assistance with meals PRN. Recs discussed with Dr Dickinson [phone].

## 2020-05-26 NOTE — PROGRESS NOTE ADULT - SUBJECTIVE AND OBJECTIVE BOX
MAYRA GARDNER  64y  Male  ***My note supersedes ALL resident notes that I sign.  My corrections for their notes are in my note.***    I can be reached directly on [id number]. My office number is [phone]. My personal cell number is [phone].    INTERVAL EVENTS: Here for f/u of severe Parkinson's Dz w/ dementia. Pt is cooperative, but he is confused. He speaks softly, but is understandable for the most part.  Pt is on 1:1 sit to avoid getting up OOB and falling down.    T(F): 97.9 (05-26-20 @ 12:14), Max: 98.2 (05-25-20 @ 13:04)  HR: 75 (05-26-20 @ 12:14) (74 - 85)  BP: 133/63 (05-26-20 @ 12:14) (133/63 - 168/77)  RR: 20 (05-26-20 @ 12:14) (18 - 20)  SpO2: 95% (05-26-20 @ 08:00) (95% - 95%)    Gen: NAD; at baseline confusion; can follow commands  HEENT: PERRL, EOMI, mouth clr, nose clr  Neck: no nodes, no JVD, thyroid nl  lungs: clr  hrt: s1 s2 rrr + 2/6 sys murmur c/w AS  abd: soft, NT/ND, no HS megaly  ext: no edema, no c/c  neuro: aa ox2, cn intact, can move all 4 ext; slight tremor rt hand    LABS:                        12.4    (    90.3   5.52  )-----------( ---------      207      ( 26 May 2020 06:30 )             34.6    (    12.5     141   (   104   (   100      05-26-20 @ 06:30  ----------------------               3.9   (   25   (   19                             -----                        0.7  Ca  9.0   Mg  1.7    P   --     CAPILLARY BLOOD GLUCOSE  POCT Blood Glucose.: 129 (05-26-20 @ 11:19)  POCT Blood Glucose.: 98 (05-26-20 @ 07:25)  POCT Blood Glucose.: 108 (05-25-20 @ 20:53)  POCT Blood Glucose.: 107 (05-25-20 @ 17:01)  POCT Blood Glucose.: 114 (05-25-20 @ 12:45)  POCT Blood Glucose.: 121 (05-25-20 @ 07:30)  POCT Blood Glucose.: 109 (05-24-20 @ 21:58)  POCT Blood Glucose.: 142 (05-24-20 @ 16:37)    RADIOLOGY & ADDITIONAL TESTS:  < from: CT Angio Chest Dissection Protocol (05.18.20 @ 19:33) >  IMPRESSION: No evidence of aortic aneurysm or dissection.    There is mild deformity of the right lateral seventh rib compatible with a nondisplaced fracture.     < end of copied text >      MEDICATIONS:    acetaminophen   Tablet .. 650 milliGRAM(s) Oral every 6 hours PRN  amLODIPine   Tablet 5 milliGRAM(s) Oral <User Schedule>  aspirin enteric coated 81 milliGRAM(s) Oral daily  atorvastatin 20 milliGRAM(s) Oral at bedtime  BACItracin   Ointment 1 Application(s) Topical daily  carbidopa/levodopa  25/100 1 Tablet(s) Oral every 6 hours  droxidopa 100 milliGRAM(s) Oral three times a day  famotidine  Oral Tab/Cap - Peds 20 milliGRAM(s) Oral daily  heparin   Injectable 5000 Unit(s) SubCutaneous every 8 hours  insulin lispro (HumaLOG) corrective regimen sliding scale   SubCutaneous three times a day before meals  latanoprost 0.005% Ophthalmic Solution 1 Drop(s) Both EYES at bedtime  magnesium oxide 400 milliGRAM(s) Oral three times a day with meals  pyridostigmine 180 milliGRAM(s) Oral at bedtime  senna 2 Tablet(s) Oral at bedtime  timolol 0.5% Solution 1 Drop(s) Both EYES at bedtime

## 2020-05-26 NOTE — CONSULT NOTE ADULT - ATTENDING COMMENTS
65yo male with PMHx of DM, HTN, parkinson's dementia, multiple falls transferred from [facility] as trauma alert after mechanical fall this morning, +head trauma, no LOC, +aspirin. Complains of pain of the back of his head and right chest wall. Primary survey intact, FAST demonstrates trace pericardial effusion. Secondary survey significant for mild right sided lower rib tenderness. Labs significant for lactate 6.8, creatinine 2.0, troponin 0.02, BNP 1002. CT pan scan pending. Recommend resuscitation with repeat labs to evaluate LUBNA and elevated lactate. Elevated BNP and troponemia suggestive of CHF, likely etiology of pericardial effusion over traumatic etiology. If imaging is negative for traumatic findings, will clear from Trauma with dispo as per ED. patient evaluated within 4 hours of arrival, I spoke with ER attending from [facility].
mycotic nails debrided to pts tolerance  wound care as above
I have personally seen and examined this patient.  I have fully participated in the care of this patient.  I have reviewed all pertinent clinical information, including history, physical exam, plan and note.   I have reviewed all pertinent clinical information and reviewed all relevant imaging and diagnostic studies personally.  64M history of Parkinson's disease (w/ mild dementia), DM2, and HTN p/w recurrent falls ? worsening parkinsons or severe orthostasis likely combination of both.  Recommendations as above.  Agree with above assessment except as noted.
agree with above

## 2020-05-26 NOTE — CONSULT NOTE ADULT - SUBJECTIVE AND OBJECTIVE BOX
S : MAYRA GARDNER is a 64y year old Male seen at bedside with a chief complaint of   painful thickened, dystrophic, ingrowing  and long toenails digits 1-5 bilaterally  and preventative foot examination. Patient is medically managed  by Medicine/Hospitalists.  Patient denies any history o f trauma to both feet.  Patient has no other pedal complaints.  Patient is experiencing pain while standing, walking and in shoe gear.       PMH: Mild dementia  Parkinson's disease  Diabetes 1.5, managed as type 2   PAST MEDICAL & SURGICAL HISTORY:  Mild dementia  Parkinson's disease  Diabetes 1.5, managed as type 2  No significant past surgical history    PSH:No significant past surgical history    Allergies:No Known Allergies      Labs:                        12.7   6.67  )-----------( 214      ( 25 May 2020 05:34 )             36.4       WBC Trend  6.67 Date (05-25 @ 05:34)  6.58 Date (05-24 @ 05:32)  4.62<L> Date (05-23 @ 06:00)  5.76 Date (05-22 @ 06:48)  5.53 Date (05-21 @ 04:54)  6.77 Date (05-19 @ 05:37)  9.75 Date (05-18 @ 17:00)  8.49 Date (05-06 @ 11:10)      Chem  05-25    140  |  103  |  22<H>  ----------------------------<  110<H>  3.8   |  25  |  0.9    Ca    9.3      25 May 2020 05:34  Mg     1.6     05-25        T(F): 97.4 (05-26-20 @ 04:13), Max: 98.2 (05-25-20 @ 13:04)  HR: 74 (05-26-20 @ 04:13) (74 - 89)  BP: 137/62 (05-26-20 @ 04:13) (120/71 - 168/77)  RR: 18 (05-26-20 @ 04:13) (18 - 18)  SpO2: 95% (05-25-20 @ 19:15) (95% - 95%)  Wt(kg): --    O:   DERM:  Skin warm, dry and supple bilateral.  No open lesions or inter-digital macerations noted bilateral.   Toenails 1-5 Right and Left feet thickened, elongated, discolored, and dystrophic with subungual debris. There is pain upon palpation of all fungal and ingrowing nails 1-5 bilaterally.   new nail growth left hallux with dry blood noted, stable  VASC: Dorsalis Pedis and Posterior Tibial pulses 0/4.  Capillary re-fill time less than 3 seconds digits 1-5 bilateral.  cold to touch. varicose veins b/l LE  NEURO: Protective sensation intact to the level of the digits bilateral.  MUSK: hallux limitus b/l hallux     A:   1. Bilateral dystrophic toenails consistent with  Onychomycosis.   2. Pain from Elongated nails, ingrowing, incurvated,  and dystrophic nails upon palpation of nails.  3. left hallux superficial laceration nail bed, stable  P:   Discussed diagnosis and treatment with patient  Aseptic debridement and curretage  of all fungal and ingrowing  nails 1-5 bilateral with sterile nail nipper.  applied bandage left hallux   local wound care: wash with soap and water, apply bacitracin and bandage left hallux for 5 days  Attending updated.   Please re-consult as needed.  05-26-20 @ 06:57

## 2020-05-26 NOTE — DIETITIAN INITIAL EVALUATION ADULT. - RD TO REMAIN AVAILABLE
yes/INTERVENTION: meals and snacks, medical food supplements, coordination of care. ME: RD to monitor diet order, energy intake, body composition, NFPF, glucose profile

## 2020-05-26 NOTE — PROGRESS NOTE ADULT - ASSESSMENT
65 y/o man with PMH of Parkinson's disease (w/ dementia), DM2, and HTN who was BIBEMS from [facility] for multiple falls on the day of presentation, associated with a subacute decline in mental and functional status per Asst Arely report.    # s/p multiple falls likely due to severe orthostatic hypotension and PD (Shy-Drager syndrome)  s/p IV hydration with resolution of LUBNA and rhabdomyolysis  spoke w/ Neuro:  d/c HS dose of droxidopa  cont Mestinon ER 180mg po HS  check orthostatics daily in AM -> I am more concerned w/ clinical response than the actual numbers of the BP  fall precautions (so pt is on 1:1 for now)  f/u physical therapy  continue compression stockings and abdominal binder    # Parkinson's Disease with dementia (prob Lewy-Body)   cont sinemet   d/w Neuro: inc pimavanserin to 34mg po q24  avoid haldol and ativan    # hypomagnesemia  cont w/ oral repletion (decr to q12)    # DM type 2 - controlled off meds  can d/c insulin  can d/c FS cont diabetic diet    # Supine HTN  adjusting droxidopa w/ neuro  amlodipine 5mg at night   cont asa 81mg q24    # DLD on statin    # glaucoma: cont gtts    # normo anemia (mild) of chr dz    # DVT ppx: change hep sc to LMWH q24    # GI ppx: on pepcid    # Hypomagnesemia - repleted IV and PO today    # FULL CODE - will have to have on-going eval for this, zoya if he worsens    Dispo: f/u neuro re tx of orthostatic BP and dementia; will eventually need to d/c 1:1; f/u HTN; d/c FS; limit labs  eventually will need prob LT placement in NH (dementia unit) -> f/u CM  ideally, need HCP from family 65 y/o man with PMH of Parkinson's disease (w/ dementia), DM2, and HTN who was BIBEMS from [facility] for multiple falls on the day of presentation, associated with a subacute decline in mental and functional status per Asst Arely report.    # s/p multiple falls likely due to severe orthostatic hypotension and PD (Shy-Drager syndrome)  s/p IV hydration with resolution of LUBNA and rhabdomyolysis  spoke w/ Neuro:  d/c HS dose of droxidopa  cont Mestinon ER 180mg po HS  check orthostatics daily in AM -> I am more concerned w/ clinical response than the actual numbers of the BP  fall precautions (so pt is on 1:1 for now)  f/u physical therapy  continue compression stockings and abdominal binder    # Parkinson's Disease with dementia (prob Lewy-Body)   cont sinemet   d/w Neuro: inc pimavanserin to 34mg po q24  avoid haldol and ativan    # hypomagnesemia  cont w/ oral repletion (decr to q12)    # DM type 2 - controlled off meds  can d/c insulin  can d/c FS cont diabetic diet    # Supine HTN  adjusting droxidopa w/ neuro  amlodipine 5mg at night   cont asa 81mg q24    # DLD on statin    # glaucoma: cont gtts    # normo anemia (mild) of chr dz    # DVT ppx: change hep sc to LMWH q24    # GI ppx: on pepcid    # Hypomagnesemia - repleted IV and PO today    # FULL CODE - will have to have on-going eval for this, zoya if he worsens    # Spoke w/ dtrMandi, about dx and plan; she is agreeable to long-term placement in SNF upon D/C; I told CM will talk to her further about this    Dispo: f/u neuro re tx of orthostatic BP and dementia; will eventually need to d/c 1:1; f/u HTN; d/c FS; limit labs  eventually will need prob LT placement in NH (dementia unit) -> f/u CM  ideally, need HCP from family

## 2020-05-26 NOTE — PROGRESS NOTE ADULT - ASSESSMENT
65 y/o man with PMH of Parkinson's disease (w/ mild dementia), DM2, and HTN who was BIBEMS from [facility] for multiple falls on the day of presentation, associated with a subacute decline in mental and functional status per nursing home report.    # s/p multiple falls / hypertensive orthostatic hypotension  -severely orthostatic today  - now on droxidopa   - continue compression stockings and abdominal binder ( will start today )  -f/u PT  -on amlodipine at night         # Parkinson's Disease with altered mental status (psychosis)  - sinemet dose increased  - Psych and Neuro f/u appreciated  - pimavanserin started on 5/22 PM and monitor response  - avoid haldol and ativan  - Neuro f/u for medication adjust    #s/p IV hydration with resolution of LUBNA and rhabdomyolysis     DM type 2 - controlled                                                                                                                                                                                                                                                                                                       FULL CODE status   [facility] now 65 y/o man with PMH of Parkinson's disease (w/ mild dementia), DM2, and HTN who was BIBEMS from [facility] for multiple falls on the day of presentation, associated with a subacute decline in mental and functional status per nursing home report.    # s/p multiple falls / hypertensive orthostatic hypotension  -severely orthostatic today  - now on droxidopa   - continue compression stockings and abdominal binder ( will start today )  -f/u PT  -on amlodipine at night         # Parkinson's Disease with altered mental status (psychosis)  - sinemet,  pimavanserin increased to 34 qhs, trial of pyridostigmine cr 180qhs   - avoid haldol and ativan  - Neuro f/u     #s/p IV hydration with resolution of LUBNA and rhabdomyolysis     DM type 2 - controlled                                                                                                                                                                                                                                                                                                       FULL CODE status   from [facility] for now

## 2020-05-26 NOTE — PROGRESS NOTE ADULT - ASSESSMENT
64M history of Parkinson's disease (w/ mild dementia), DM2, and HTN who was BIBEMS from [facility] for multiple falls on the day of presentation. CT head and cervical spine negative. Trauma workup revealing R lateral 7th rib fracture. Vitals significant for orthostatic hypotension.  On neurological exam patient bradykinetic, bradyphrenic with cogwheel rigidity and resting tremor.      Recommendations:     -c/w sinemet to 25/100 mg q6   - decrease droxidopa to 100mg BID (stop nightime dose)  - continue compression stocking, abdominal binder   - increase Nuplazid 10mg PO qhs to 34 mg po qhs for agitation/hallucinations         attending attestation to follow 64M history of Parkinson's disease (w/ mild dementia), DM2, and HTN who was BIBEMS from [facility] for multiple falls on the day of presentation. CT head and cervical spine negative. Trauma workup revealing R lateral 7th rib fracture. Vitals significant for orthostatic hypotension.  On neurological exam patient bradykinetic, bradyphrenic with cogwheel rigidity and resting tremor.      Recommendations:     -c/w sinemet to 25/100 mg q6   - decrease droxidopa to 100mg 2x/day(stop nighttime dose; one in morning and one in afternoon)  - continue compression stocking, abdominal binder   - increase Nuplazid 10mg PO qhs to 34 mg po qhs for agitation/hallucinations   - Trial of pyridostigmine  QHS  - Continue monitoring orthostatics  - Sleep with HOB at 30 degrees to avoid supine hypertension          attending attestation to follow

## 2020-05-26 NOTE — PROGRESS NOTE ADULT - SUBJECTIVE AND OBJECTIVE BOX
Neurology Progress Note    Interval History:  Patient continues to have severe orthostatic hypotension. Additionally, nursing staff continue to report moments of agitation throughout the day. Currently patient has 1:1 sitter at bedside.     HPI:  Patient is a 64 year old gentleman with a history of Parkinson's disease (w/ mild dementia), DM2, and HTN who was BIBEMS from [facility] for multiple falls on the day of presentation. Per patient history, he had 3 falls today. The first two were early in the morning, the last one was in the evening. He states that he had been standing for a few minutes before each fall and suddenly felt lightheaded/"woozy". He fell back and hit his head all three times. He denies any preceding confusion, chest pain, shortness of breath but does not endorse feeling as if his heart was racing throughout the day. He states after the fall he was able to get back up right away with no loss of consciousness or post-fall confusion. He denies any associated urinary/fecal incontinence. He states that he has been feeling palpitations intermittently for the past few months. ROS negative for any fevers/chills, vision changes, sore throat/runny nose, chest pain, abdominal pain, diarrhea/constipation. Patient reports has recently stared walking with a walker for stability for the past few months. Per [facility], patient has been having more frequent falls over the last few days and after two falls today they decided to have him sent in for reevaluation. They have also noticed a decline in his mental status where has been more confused and forgetful recently. Col[place] corroborates that there is no documentation of LOC or incontinence with falls today.    Patient initially presented to [facility]. BP on arrival was 90s/50s, HR 96, with lactate of 6.8; point of care ultrasound demonstrated small pericardial effusion. He was transferred to [place] for further workup. His BP on presentation to [place] was 146/84, HR 94, lactate 2.2 after receiving 1L of LR. Trauma workup was negative except for non-displaced fracture of R lateral 7th rib. Patient was cleared by trauma service and will be admitted to medicine for further workup of recurrent falls. (18 May 2020 23:10)      PAST MEDICAL & SURGICAL HISTORY:  Mild dementia  Parkinson's disease  Diabetes 1.5, managed as type 2  No significant past surgical history          Medications:  acetaminophen   Tablet .. 650 milliGRAM(s) Oral every 6 hours PRN  amLODIPine   Tablet 5 milliGRAM(s) Oral <User Schedule>  aspirin enteric coated 81 milliGRAM(s) Oral daily  atorvastatin 20 milliGRAM(s) Oral at bedtime  BACItracin   Ointment 1 Application(s) Topical daily  carbidopa/levodopa  25/100 1 Tablet(s) Oral every 6 hours  droxidopa 100 milliGRAM(s) Oral three times a day  famotidine  Oral Tab/Cap - Peds 20 milliGRAM(s) Oral daily  heparin   Injectable 5000 Unit(s) SubCutaneous every 8 hours  insulin lispro (HumaLOG) corrective regimen sliding scale   SubCutaneous three times a day before meals  latanoprost 0.005% Ophthalmic Solution 1 Drop(s) Both EYES at bedtime  magnesium oxide 400 milliGRAM(s) Oral three times a day with meals  senna 2 Tablet(s) Oral at bedtime  timolol 0.5% Solution 1 Drop(s) Both EYES at bedtime      Vital Signs Last 24 Hrs  T(C): 36.3 (26 May 2020 04:13), Max: 36.8 (25 May 2020 13:04)  T(F): 97.4 (26 May 2020 04:13), Max: 98.2 (25 May 2020 13:04)  HR: 74 (26 May 2020 04:13) (74 - 85)  BP: 137/62 (26 May 2020 04:13) (137/62 - 168/77)  BP(mean): --  RR: 18 (26 May 2020 04:13) (18 - 18)  SpO2: 95% (26 May 2020 08:00) (95% - 95%)    Neurological Exam:   A&O x 2 (person, place ([facility], president: Irma)   Bradykinetic, Bradyphrenic, masked facies   Cogwheel rigidity noted in UE R>L   resting tremor noted  intact VA, VFF.  EOMI w/o nystagmus, skew or reported double vision.  PERRL.   Facial sensation normal V1 - 3, no facial asymmetry.     Hearing grossly intact b/l.  Palate elevates midline.  Tongue and uvula midline.   Muscle strength: 5/5 UE; 5/5 LE.  No observable drift.  Sensation Intact to light touch in all extremities.  Gait deferred     Labs:  CBC Full  -  ( 26 May 2020 06:30 )  WBC Count : 5.52 K/uL  RBC Count : 3.83 M/uL  Hemoglobin : 12.4 g/dL  Hematocrit : 34.6 %  Platelet Count - Automated : 207 K/uL  Mean Cell Volume : 90.3 fL  Mean Cell Hemoglobin : 32.4 pg  Mean Cell Hemoglobin Concentration : 35.8 g/dL  Auto Neutrophil # : 3.58 K/uL  Auto Lymphocyte # : 1.11 K/uL  Auto Monocyte # : 0.60 K/uL  Auto Eosinophil # : 0.16 K/uL  Auto Basophil # : 0.05 K/uL  Auto Neutrophil % : 64.8 %  Auto Lymphocyte % : 20.1 %  Auto Monocyte % : 10.9 %  Auto Eosinophil % : 2.9 %  Auto Basophil % : 0.9 %        141  |  104  |  19  ----------------------------<  100<H>  3.9   |  25  |  0.7    Ca    9.0      26 May 2020 06:30  Mg     1.7     05        Orthostatics 5.26:  Standin/75 HR: 79  Sittin/55 HR: 81

## 2020-05-26 NOTE — PROGRESS NOTE ADULT - SUBJECTIVE AND OBJECTIVE BOX
MAYRA GARDNER  64y  Male      Patient is a 64y old  Male who presents with a chief complaint of Fall (26 May 2020 06:56)      INTERVAL HPI/OVERNIGHT EVENTS:  no acute events overnight, pt is AO*2 this AM, orthostatics check this AM, psoitive symptomatic on standing       Vital Signs Last 24 Hrs  T(C): 36.3 (26 May 2020 04:13), Max: 36.8 (25 May 2020 13:04)  T(F): 97.4 (26 May 2020 04:13), Max: 98.2 (25 May 2020 13:04)  HR: 74 (26 May 2020 04:13) (74 - 85)  BP: 137/62 (26 May 2020 04:13) (137/62 - 168/77), orthostatic positive     RR: 18 (26 May 2020 04:13) (18 - 18)  SpO2: 95% (25 May 2020 19:15) (95% - 95%)    PHYSICAL EXAM:  General: No acute distress.  . Elderly   male with abdominal binder in bed  HEENT: Pupils, reactive to light   PULM: Clear to auscultation bilaterally, no significant sputum production.  CVS: Regular rate and rhythm, no murmurs, rubs, or gallops.  GI: Soft, nondistended, nontender, no masses.  MSK: No edema, nontender. Silver stockings on LE, Cogwheel rigidity   SKIN: Warm and well perfused, no rashes noted.  PSYCH: AAO1-2  NEURO: Nn  focal       LABS:                        12.4   5.52  )-----------( 207      ( 26 May 2020 06:30 )             34.6     05-26    141  |  104  |  19  ----------------------------<  100<H>  3.9   |  25  |  0.7    Ca    9.0      26 May 2020 06:30  Mg     1.7     05-26            CAPILLARY BLOOD GLUCOSE      POCT Blood Glucose.: 98 mg/dL (26 May 2020 07:25)  POCT Blood Glucose.: 108 mg/dL (25 May 2020 20:53)  POCT Blood Glucose.: 107 mg/dL (25 May 2020 17:01)  POCT Blood Glucose.: 114 mg/dL (25 May 2020 12:45)

## 2020-05-27 LAB
ALBUMIN SERPL ELPH-MCNC: 3.7 G/DL — SIGNIFICANT CHANGE UP (ref 3.5–5.2)
ALP SERPL-CCNC: 108 U/L — SIGNIFICANT CHANGE UP (ref 30–115)
ALT FLD-CCNC: 8 U/L — SIGNIFICANT CHANGE UP (ref 0–41)
ANION GAP SERPL CALC-SCNC: 11 MMOL/L — SIGNIFICANT CHANGE UP (ref 7–14)
AST SERPL-CCNC: 20 U/L — SIGNIFICANT CHANGE UP (ref 0–41)
BASOPHILS # BLD AUTO: 0.05 K/UL — SIGNIFICANT CHANGE UP (ref 0–0.2)
BASOPHILS NFR BLD AUTO: 1 % — SIGNIFICANT CHANGE UP (ref 0–1)
BILIRUB SERPL-MCNC: 0.4 MG/DL — SIGNIFICANT CHANGE UP (ref 0.2–1.2)
BUN SERPL-MCNC: 21 MG/DL — HIGH (ref 10–20)
CALCIUM SERPL-MCNC: 9.2 MG/DL — SIGNIFICANT CHANGE UP (ref 8.5–10.1)
CHLORIDE SERPL-SCNC: 102 MMOL/L — SIGNIFICANT CHANGE UP (ref 98–110)
CO2 SERPL-SCNC: 26 MMOL/L — SIGNIFICANT CHANGE UP (ref 17–32)
CREAT SERPL-MCNC: 0.8 MG/DL — SIGNIFICANT CHANGE UP (ref 0.7–1.5)
EOSINOPHIL # BLD AUTO: 0.17 K/UL — SIGNIFICANT CHANGE UP (ref 0–0.7)
EOSINOPHIL NFR BLD AUTO: 3.3 % — SIGNIFICANT CHANGE UP (ref 0–8)
GLUCOSE BLDC GLUCOMTR-MCNC: 117 MG/DL — HIGH (ref 70–99)
GLUCOSE BLDC GLUCOMTR-MCNC: 182 MG/DL — HIGH (ref 70–99)
GLUCOSE SERPL-MCNC: 111 MG/DL — HIGH (ref 70–99)
HCT VFR BLD CALC: 33.2 % — LOW (ref 42–52)
HGB BLD-MCNC: 12 G/DL — LOW (ref 14–18)
IMM GRANULOCYTES NFR BLD AUTO: 0.2 % — SIGNIFICANT CHANGE UP (ref 0.1–0.3)
LYMPHOCYTES # BLD AUTO: 1.12 K/UL — LOW (ref 1.2–3.4)
LYMPHOCYTES # BLD AUTO: 21.9 % — SIGNIFICANT CHANGE UP (ref 20.5–51.1)
MAGNESIUM SERPL-MCNC: 2 MG/DL — SIGNIFICANT CHANGE UP (ref 1.8–2.4)
MCHC RBC-ENTMCNC: 32.7 PG — HIGH (ref 27–31)
MCHC RBC-ENTMCNC: 36.1 G/DL — SIGNIFICANT CHANGE UP (ref 32–37)
MCV RBC AUTO: 90.5 FL — SIGNIFICANT CHANGE UP (ref 80–94)
MONOCYTES # BLD AUTO: 0.48 K/UL — SIGNIFICANT CHANGE UP (ref 0.1–0.6)
MONOCYTES NFR BLD AUTO: 9.4 % — HIGH (ref 1.7–9.3)
NEUTROPHILS # BLD AUTO: 3.29 K/UL — SIGNIFICANT CHANGE UP (ref 1.4–6.5)
NEUTROPHILS NFR BLD AUTO: 64.2 % — SIGNIFICANT CHANGE UP (ref 42.2–75.2)
NRBC # BLD: 0 /100 WBCS — SIGNIFICANT CHANGE UP (ref 0–0)
PLATELET # BLD AUTO: 216 K/UL — SIGNIFICANT CHANGE UP (ref 130–400)
POTASSIUM SERPL-MCNC: 3.8 MMOL/L — SIGNIFICANT CHANGE UP (ref 3.5–5)
POTASSIUM SERPL-SCNC: 3.8 MMOL/L — SIGNIFICANT CHANGE UP (ref 3.5–5)
PROT SERPL-MCNC: 6.5 G/DL — SIGNIFICANT CHANGE UP (ref 6–8)
RBC # BLD: 3.67 M/UL — LOW (ref 4.7–6.1)
RBC # FLD: 12.5 % — SIGNIFICANT CHANGE UP (ref 11.5–14.5)
SODIUM SERPL-SCNC: 139 MMOL/L — SIGNIFICANT CHANGE UP (ref 135–146)
WBC # BLD: 5.12 K/UL — SIGNIFICANT CHANGE UP (ref 4.8–10.8)
WBC # FLD AUTO: 5.12 K/UL — SIGNIFICANT CHANGE UP (ref 4.8–10.8)

## 2020-05-27 PROCEDURE — 99232 SBSQ HOSP IP/OBS MODERATE 35: CPT

## 2020-05-27 RX ORDER — MAGNESIUM OXIDE 400 MG ORAL TABLET 241.3 MG
400 TABLET ORAL DAILY
Refills: 0 | Status: DISCONTINUED | OUTPATIENT
Start: 2020-05-27 | End: 2020-05-30

## 2020-05-27 RX ORDER — QUETIAPINE FUMARATE 200 MG/1
25 TABLET, FILM COATED ORAL AT BEDTIME
Refills: 0 | Status: DISCONTINUED | OUTPATIENT
Start: 2020-05-27 | End: 2020-05-30

## 2020-05-27 RX ORDER — PYRIDOSTIGMINE BROMIDE 60 MG/5ML
180 SOLUTION ORAL AT BEDTIME
Refills: 0 | Status: DISCONTINUED | OUTPATIENT
Start: 2020-05-27 | End: 2020-05-30

## 2020-05-27 RX ORDER — DROXIDOPA 100 MG/1
200 CAPSULE ORAL
Refills: 0 | Status: DISCONTINUED | OUTPATIENT
Start: 2020-05-27 | End: 2020-05-30

## 2020-05-27 RX ORDER — AMLODIPINE BESYLATE 2.5 MG/1
2.5 TABLET ORAL
Refills: 0 | Status: DISCONTINUED | OUTPATIENT
Start: 2020-05-27 | End: 2020-05-28

## 2020-05-27 RX ORDER — ENOXAPARIN SODIUM 100 MG/ML
40 INJECTION SUBCUTANEOUS DAILY
Refills: 0 | Status: DISCONTINUED | OUTPATIENT
Start: 2020-05-28 | End: 2020-05-30

## 2020-05-27 RX ADMIN — Medication 81 MILLIGRAM(S): at 11:50

## 2020-05-27 RX ADMIN — SENNA PLUS 2 TABLET(S): 8.6 TABLET ORAL at 21:58

## 2020-05-27 RX ADMIN — HEPARIN SODIUM 5000 UNIT(S): 5000 INJECTION INTRAVENOUS; SUBCUTANEOUS at 13:02

## 2020-05-27 RX ADMIN — FAMOTIDINE 20 MILLIGRAM(S): 10 INJECTION INTRAVENOUS at 11:50

## 2020-05-27 RX ADMIN — Medication 1 DROP(S): at 21:57

## 2020-05-27 RX ADMIN — Medication 1: at 11:50

## 2020-05-27 RX ADMIN — CARBIDOPA AND LEVODOPA 1 TABLET(S): 25; 100 TABLET ORAL at 17:02

## 2020-05-27 RX ADMIN — ATORVASTATIN CALCIUM 20 MILLIGRAM(S): 80 TABLET, FILM COATED ORAL at 21:57

## 2020-05-27 RX ADMIN — DROXIDOPA 200 MILLIGRAM(S): 100 CAPSULE ORAL at 17:02

## 2020-05-27 RX ADMIN — PYRIDOSTIGMINE BROMIDE 360 MILLIGRAM(S): 60 SOLUTION ORAL at 11:50

## 2020-05-27 RX ADMIN — HEPARIN SODIUM 5000 UNIT(S): 5000 INJECTION INTRAVENOUS; SUBCUTANEOUS at 05:46

## 2020-05-27 RX ADMIN — Medication 1 APPLICATION(S): at 11:51

## 2020-05-27 RX ADMIN — DROXIDOPA 100 MILLIGRAM(S): 100 CAPSULE ORAL at 11:51

## 2020-05-27 RX ADMIN — CARBIDOPA AND LEVODOPA 1 TABLET(S): 25; 100 TABLET ORAL at 11:51

## 2020-05-27 RX ADMIN — MAGNESIUM OXIDE 400 MG ORAL TABLET 400 MILLIGRAM(S): 241.3 TABLET ORAL at 11:50

## 2020-05-27 RX ADMIN — LATANOPROST 1 DROP(S): 0.05 SOLUTION/ DROPS OPHTHALMIC; TOPICAL at 21:58

## 2020-05-27 RX ADMIN — CARBIDOPA AND LEVODOPA 1 TABLET(S): 25; 100 TABLET ORAL at 05:46

## 2020-05-27 RX ADMIN — PYRIDOSTIGMINE BROMIDE 180 MILLIGRAM(S): 60 SOLUTION ORAL at 22:00

## 2020-05-27 RX ADMIN — MAGNESIUM OXIDE 400 MG ORAL TABLET 400 MILLIGRAM(S): 241.3 TABLET ORAL at 08:46

## 2020-05-27 NOTE — PROGRESS NOTE ADULT - ASSESSMENT
65 y/o man with PMH of Parkinson's disease (w/ mild dementia), DM2, and HTN who was BIBEMS from [facility] for multiple falls on the day of presentation, associated with a subacute decline in mental and functional status per nursing home report.    # s/p multiple falls / hypertensive orthostatic hypotension  -severely orthostatic today  - now on droxidopa   - continue compression stockings and abdominal binder ( will start today )  -f/u PT  -on amlodipine at night ( decrease to 2.5)          # Parkinson's Disease with altered mental status (psychosis)  - sinemet,  pimavanserin increased to 34 qhs, trial of pyridostigmine cr 180qhs   - avoid haldol and ativan  - Neuro f/u     #s/p IV hydration with resolution of LUBNA and rhabdomyolysis     DM type 2 - controlled                                                                                                                                                                                                                                                                                                       FULL CODE status   from [place] acute possible d/c 24h to 48 hour 63 y/o man with PMH of Parkinson's disease (w/ mild dementia), DM2, and HTN who was BIBEMS from [facility] for multiple falls on the day of presentation, associated with a subacute decline in mental and functional status per nursing home report.    # s/p multiple falls / hypertensive orthostatic hypotension  -severely orthostatic today  - now on droxidopa   - continue compression stockings and abdominal binder ( will start today )  -f/u PT  -on amlodipine at night ( decrease to 2.5)          # Parkinson's Disease with altered mental status (psychosis)  - sinemet,  pimavanserin increased to 34 qhs, trial of pyridostigmine cr 180qhs, increase carbidopa 200mg AM and 2pm  - avoid haldol and ativan  - Neuro f/u     #s/p IV hydration with resolution of LUBNA and rhabdomyolysis     DM type 2 - controlled                                                                                                                                                                                                                                                                                                       FULL CODE status   from [place] acute possible d/c 24h to 48 hour

## 2020-05-27 NOTE — PROGRESS NOTE ADULT - ASSESSMENT
64M history of Parkinson's disease (w/ mild dementia), DM2, and HTN who was BIBEMS from [facility] for multiple falls on the day of presentation. CT head and cervical spine negative. Trauma workup revealing R lateral 7th rib fracture. Vitals significant for orthostatic hypotension.  Remains confused , but no focal findings on exam, no acute events     Bp lying 144/94  Bp sitting 88/58    Recommendations:   - Continue Sinemet 25/100 Q6  - Increase Droxidopa 200 mg in AM/Afternoon (not at bedtime)  - Continue Nuplazid 34 mg QHS  - Continue pyridostigmine CR 180mg QHS  -Strict fall safety precautions

## 2020-05-27 NOTE — PROGRESS NOTE ADULT - ASSESSMENT
64M history of Parkinson's disease (w/ mild dementia), DM2, and HTN who was BIBEMS from [facility] for multiple falls on the day of presentation. CT head and cervical spine negative. Trauma workup revealing R lateral 7th rib fracture. Vitals significant for orthostatic hypotension.  On neurological exam patient bradykinetic, bradyphrenic with cogwheel rigidity and resting tremor.      Recommendations:     - Continue Sinemet 25/100 Q6  - Continue Droxidopa 100 mg AM/PM (not at bedtime)  - Continue Nuplazid 34 mg QHS  - Continue pyridostigmine  - Orthostatics 64M history of Parkinson's disease (w/ mild dementia), DM2, and HTN who was BIBEMS from [facility] for multiple falls on the day of presentation. CT head and cervical spine negative. Trauma workup revealing R lateral 7th rib fracture. Vitals significant for orthostatic hypotension.  On neurological exam patient bradykinetic, bradyphrenic with cogwheel rigidity and resting tremor.      Recommendations:     - Continue Sinemet 25/100 Q6  - Increase Droxidopa 200 mg in AM/Afternoon (not at bedtime)  - Continue Nuplazid 34 mg QHS  - Continue pyridostigmine CR 180mg QHS  - Orthostatics vitals

## 2020-05-27 NOTE — PROGRESS NOTE ADULT - SUBJECTIVE AND OBJECTIVE BOX
Neurology Follow up note  Patient examined at bedside he is awake alert and calm currently as per RN he becomes restless occasionally and  tries to get oob , no episodes of agitation, no acute overnight events.          HPI:  64 year old gentleman with a history of Parkinson's disease (w/ mild dementia), DM2, and HTN who was BIBEMS from [facility] for multiple falls on the day of presentation. Per patient history, he had 3 falls today. The first two were early in the morning, the last one was in the evening. He states that he had been standing for a few minutes before each fall and suddenly felt lightheaded/"woozy". He fell back and hit his head all three times. He denies any preceding confusion, chest pain, shortness of breath but does not endorse feeling as if his heart was racing throughout the day. He states after the fall he was able to get back up right away with no loss of consciousness or post-fall confusion. He denies any associated urinary/fecal incontinence.  Patient reports has recently stared walking with a walker for stability for the past few months. Per [facility], patient has been having more frequent falls over the last few days and after two falls today they decided to have him sent in for reevaluation. They have also noticed a decline in his mental status where has been more confused and forgetful recently. [place] corroborates that there is no documentation of LOC or incontinence with falls today.Patient initially presented to [facility]. BP on arrival was 90s/50s, HR 96, with lactate of 6.8; point of care ultrasound demonstrated small pericardial effusion. He was transferred to [facility] for further workup. His BP on presentation to [facility] was 146/84, HR 94, lactate 2.2 after receiving 1L of LR. Trauma workup was negative except for non-displaced fracture of R lateral 7th rib. Patient was cleared by trauma service and will be admitted to medicine for further workup of recurrent falls.             Vital Signs Last 24 Hrs  T(C): 36.3 (27 May 2020 19:57), Max: 36.3 (27 May 2020 19:57)  T(F): 97.3 (27 May 2020 19:57), Max: 97.3 (27 May 2020 19:57)  HR: 76 (27 May 2020 19:57) (75 - 83)  BP: 131/64 (27 May 2020 19:57) (121/86 - 154/85)  BP(mean): --  RR: 18 (27 May 2020 19:57) (18 - 18)  SpO2: 98% (27 May 2020 05:31) (98% - 98%)          Neurological Exam:   Mental status: Patient is alert /awake oriented to his name , follows commands, masked facies   Cranial nerves: Grossly Intact  Bradykinetic, Bradyphrenic,   Cogwheel rigidity noted in UE R>L   No resting tremor noted.  .   Muscle strength: 5/5 UE; 5/5 LE.  No observable drift.  Sensation Intact to light touch in all extremities.  Gait deferred         Medications  acetaminophen   Tablet .. 650 milligrams Oral every 6 hours PRN  Amlodipine   Tablet 2.5 milligram Oral <User Schedule>  aspirin enteric coated 81 milligrams Oral daily  atorvastatin 20 milligrams Oral at bedtime  Bacitracin   Ointment 1 Application(s) Topical daily  carbidopa/levodopa  25/100 1 Tablet(s) Oral every 6 hours  droxidopa 200 milligram Oral <User Schedule>  famotidine  Oral Tab/Cap - Peds 20 milligram Oral daily  latanoprost 0.005% Ophthalmic Solution 1 Drop(s) Both EYES at bedtime  magnesium oxide 400 milligrams Oral daily  pyridostigmine  milligrams Oral at bedtime  Quetiapine 25 milligrams Oral at bedtime PRN  senna 2 Tablet(s) Oral at bedtime  timolol 0.5% Solution 1 Drop(s) Both EYES at bedtime      Lab  Magnesium, Serum in AM (05.27.20 @ 06:11)    Magnesium, Serum: 2.0 mg/dL    Comprehensive Metabolic Panel in AM (05.27.20 @ 06:11)    Sodium, Serum: 139 mmol/L    Potassium, Serum: 3.8 mmol/L    Chloride, Serum: 102 mmol/L    Carbon Dioxide, Serum: 26 mmol/L    Anion Gap, Serum: 11 mmol/L    Blood Urea Nitrogen, Serum: 21 mg/dL    Creatinine, Serum: 0.8 mg/dL    Glucose, Serum: 111 mg/dL    Calcium, Total Serum: 9.2 mg/dL    Protein Total, Serum: 6.5 g/dL    Albumin, Serum: 3.7 g/dL    Bilirubin Total, Serum: 0.4 mg/dL    Alkaline Phosphatase, Serum: 108 U/L    Aspartate Aminotransferase (AST/SGOT): 20 U/L    Alanine Aminotransferase (ALT/SGPT): 8 U/L    eGFR if Non : 94: Interpretative comment    Vitamin B12, Serum in AM (05.19.20 @ 05:37)    Vitamin B12, Serum: 461 pg/mL    Folate, Serum in AM (05.19.20 @ 05:37)    Folate, Serum: 11.5 ng/mL    Thyroid Stimulating Hormone, Serum in AM (05.19.20 @ 05:37)    Thyroid Stimulating Hormone, Serum: 1.76 uIU/mL        Radiology

## 2020-05-27 NOTE — PROGRESS NOTE ADULT - SUBJECTIVE AND OBJECTIVE BOX
MAYRA GARDNER  64y  Male      Patient is a 64y old  Male who presents with a chief complaint of Fall (27 May 2020 06:36)      INTERVAL HPI/OVERNIGHT EVENTS:  no acute events overnight , pt still agitated, he slept well at night but his mental status still waxes and wanes, he was trying to get out of bed this AM.    Vital Signs Last 24 Hrs  T(C): 35.6 (27 May 2020 05:31), Max: 36.6 (26 May 2020 12:14)  T(F): 96.1 (27 May 2020 05:31), Max: 97.9 (26 May 2020 12:14)  HR: 75 (27 May 2020 05:31) (75 - 80)  BP: 121/86 (27 May 2020 05:31) (118/65 - 133/63)  BP(mean): --  RR: 18 (27 May 2020 05:31) (18 - 20)  SpO2: 98% (27 May 2020 05:31) (98% - 98%)    PHYSICAL EXAM:  General: No acute distress.  . abdominal binder in bed  HEENT: Pupils, reactive to light   PULM: Clear to auscultation bilaterally, no significant sputum production.  CVS: Regular rate and rhythm, no murmurs, rubs, or gallops.  GI: Soft, nondistended, nontender, no masses.  MSK: No edema, nontender. Silver stockings on LE, Cogwheel rigidity   SKIN: Warm and well perfused, no rashes noted.  PSYCH: AAO1-2, confused   NEURO: Nn  focal     LABS:                        12.0   5.12  )-----------( 216      ( 27 May 2020 06:11 )             33.2     05-27    139  |  102  |  21<H>  ----------------------------<  111<H>  3.8   |  26  |  0.8    Ca    9.2      27 May 2020 06:11  Mg     2.0     05-27    TPro  6.5  /  Alb  3.7  /  TBili  0.4  /  DBili  x   /  AST  20  /  ALT  8   /  AlkPhos  108  05-27          CAPILLARY BLOOD GLUCOSE      POCT Blood Glucose.: 117 mg/dL (27 May 2020 07:30)  POCT Blood Glucose.: 111 mg/dL (26 May 2020 16:34)  POCT Blood Glucose.: 129 mg/dL (26 May 2020 11:19)      RADIOLOGY & ADDITIONAL TESTS:    Imaging Personally Reviewed:  [ ] YES  [ ] NO MAYRA GARDNER  64y  Male      Patient is a 64y old  Male who presents with a chief complaint of Fall (27 May 2020 06:36)      INTERVAL HPI/OVERNIGHT EVENTS:  no acute events overnight , pt still agitated, he slept well at night but his mental status still waxes and wanes, he was trying to get out of bed this AM.    Vital Signs Last 24 Hrs  T(C): 35.6 (27 May 2020 05:31), Max: 36.6 (26 May 2020 12:14)  T(F): 96.1 (27 May 2020 05:31), Max: 97.9 (26 May 2020 12:14)  HR: 75 (27 May 2020 05:31) (75 - 80)  BP: 121/86 (27 May 2020 05:31) (118/65 - 133/63)  BP(mean): --  RR: 18 (27 May 2020 05:31) (18 - 20)  SpO2: 98% (27 May 2020 05:31) (98% - 98%)    PHYSICAL EXAM:  General: No acute distress.  . abdominal binder in bed, femoral compression stocking  HEENT: Pupils, reactive to light   PULM: Clear to auscultation bilaterally, no significant sputum production.  CVS: Regular rate and rhythm, no murmurs, rubs, or gallops.  GI: Soft, nondistended, nontender, no masses.  MSK: No edema, nontender. Silver stockings on LE, Cogwheel rigidity   SKIN: Warm and well perfused, no rashes noted.  PSYCH: AAO1-2, confused   NEURO: Nn  focal     LABS:                        12.0   5.12  )-----------( 216      ( 27 May 2020 06:11 )             33.2     05-27    139  |  102  |  21<H>  ----------------------------<  111<H>  3.8   |  26  |  0.8    Ca    9.2      27 May 2020 06:11  Mg     2.0     05-27    TPro  6.5  /  Alb  3.7  /  TBili  0.4  /  DBili  x   /  AST  20  /  ALT  8   /  AlkPhos  108  05-27          CAPILLARY BLOOD GLUCOSE      POCT Blood Glucose.: 117 mg/dL (27 May 2020 07:30)  POCT Blood Glucose.: 111 mg/dL (26 May 2020 16:34)  POCT Blood Glucose.: 129 mg/dL (26 May 2020 11:19)      RADIOLOGY & ADDITIONAL TESTS:    Imaging Personally Reviewed:  [ ] YES  [ ] NO

## 2020-05-27 NOTE — PROGRESS NOTE ADULT - SUBJECTIVE AND OBJECTIVE BOX
Neurology Progress Note    Interval History:      HPI:  Patient is a 64 year old gentleman with a history of Parkinson's disease (w/ mild dementia), DM2, and HTN who was BIBEMS from [facility] for multiple falls on the day of presentation. Per patient history, he had 3 falls today. The first two were early in the morning, the last one was in the evening. He states that he had been standing for a few minutes before each fall and suddenly felt lightheaded/"woozy". He fell back and hit his head all three times. He denies any preceding confusion, chest pain, shortness of breath but does not endorse feeling as if his heart was racing throughout the day. He states after the fall he was able to get back up right away with no loss of consciousness or post-fall confusion. He denies any associated urinary/fecal incontinence. He states that he has been feeling palpitations intermittently for the past few months. ROS negative for any fevers/chills, vision changes, sore throat/runny nose, chest pain, abdominal pain, diarrhea/constipation. Patient reports has recently stared walking with a walker for stability for the past few months. Per [facility], patient has been having more frequent falls over the last few days and after two falls today they decided to have him sent in for reevaluation. They have also noticed a decline in his mental status where has been more confused and forgetful recently. [place] corroborates that there is no documentation of LOC or incontinence with falls today.    Patient initially presented to [facility]. BP on arrival was 90s/50s, HR 96, with lactate of 6.8; point of care ultrasound demonstrated small pericardial effusion. He was transferred to [facility] for further workup. His BP on presentation to [facility] was 146/84, HR 94, lactate 2.2 after receiving 1L of LR. Trauma workup was negative except for non-displaced fracture of R lateral 7th rib. Patient was cleared by trauma service and will be admitted to medicine for further workup of recurrent falls. (18 May 2020 23:10)    Neurological Exam:   A&O x 2 (person, place ([facility], president: Irma)   Bradykinetic, Bradyphrenic, masked facies   Cogwheel rigidity noted in UE R>L   resting tremor noted  intact VA, VFF.  EOMI w/o nystagmus, skew or reported double vision.  PERRL.   Facial sensation normal V1 - 3, no facial asymmetry.     Hearing grossly intact b/l.  Palate elevates midline.  Tongue and uvula midline.   Muscle strength: 5/5 UE; 5/5 LE.  No observable drift.  Sensation Intact to light touch in all extremities.  Gait deferred     Orthostatics 5.26:  Standin/75 HR: 79  Sittin/55 HR: 81

## 2020-05-27 NOTE — PROGRESS NOTE ADULT - ASSESSMENT
63 y/o man with PMH of Parkinson's disease (w/ dementia), DM2, and HTN who was BIBEMS from [facility] for multiple falls on the day of presentation, associated with a subacute decline in mental and functional status per Asst Arely report.    # s/p multiple falls likely due to severe orthostatic hypotension and PD (Shy-Drager syndrome)  s/p IV hydration with resolution of LUBNA and rhabdomyolysis  spoke w/ Neuro:  d/c HS dose of droxidopa, but make it 200mg at 8am and 200mg at 4pm  cont Mestinon ER 180mg po HS  check orthostatics daily in AM -> I am more concerned w/ clinical response than the actual numbers of the BP  fall precautions  f/u physical therapy  continue compression stockings and abdominal binder    # Parkinson's Disease with dementia (prob Lewy-Body)   cont sinemet 25/100 po q6  d/w Neuro: c/w pimavanserin to 34mg po q24  avoid haldol and ativan  ask psych about use of trazadone 25mg hs or seroquel 25mg hs as sleep aid for nighttime agitation/sundowning     # hypomagnesemia  cont w/ oral repletion (decr to q24)    # DM type 2 - controlled off meds  can d/c insulin  can d/c FS  cont diabetic diet    # Supine HTN  adjusting droxidopa w/ neuro  decr amlodipine 2.5mg at night   cont asa 81mg q24    # DLD on statin    # glaucoma: cont gtts    # normo anemia (mild) of chr dz    # DVT ppx: change hep sc to LMWH q24    # GI ppx: on pepcid    # FULL CODE - will have to have on-going eval for this, zoya if he worsens    # prev spoke w/ dtr, Mandi, about dx and plan; she is agreeable to long-term placement in SNF upon D/C;    Dispo: f/u neuro re tx of orthostatic BP; f/u psych re tx of evening dementia; f/u HTN; d/c FS; limit labs  eventually will need prob LT placement in NH (dementia unit) -> f/u CM (poss start as STR, then convert to LT? - PT eval)  ideally, also need HCP from family

## 2020-05-27 NOTE — PROGRESS NOTE ADULT - ATTENDING COMMENTS
Patient seen and examined with resident and history, notes, labs, vitals and medications reviewed by me personally.  Patient is confused and believes he is a student in the hospital  His exam shows bradykinesia and he has atrophy in LE b/l    Plan as above which was discussed with attending Dr. Alan
Patient seen and examined and agree with above except as noted.  Patients history, notes, meds, labs, imaging and vitals reviewed personally.  Patient calm this morning no events overnight.  Asking about his breakfast   NO resting tremor  Increased tone in UE > LE    Plan as above (follow up in 2-4 weeks in neurology clinic)
Patient seen and examined in chair outside of room.  Reviewed notes, labs, meds, vitals.  Blood pressure this morning better then prior 2 mornings.  NO orthostatics checked this morning.  Still somewhat confused, talking on tangents speech is dysarhtric, no focality on motor exam    Plan as above

## 2020-05-27 NOTE — PROGRESS NOTE ADULT - SUBJECTIVE AND OBJECTIVE BOX
MAYRA GARDNER  64y  Male  ***My note supersedes ALL resident notes that I sign.  My corrections for their notes are in my note.***    I can be reached directly on [email or web address]. My office number is [phone]. My personal cell number is [phone].    INTERVAL EVENTS: Here for f/u of Parkinson's Dz. Pt had a little agitation last night, but RN managed w/ reassurance. Pt remains off 1:1. He is calm today. At breakfast on his own.    T(F): 96.8 (05-27-20 @ 12:41), Max: 96.8 (05-27-20 @ 12:41)  HR: 80 (05-27-20 @ 12:41) (75 - 80)  BP: 154/85 (05-27-20 @ 12:41) (118/65 - 154/85)  RR: 18 (05-27-20 @ 12:41) (18 - 18)  SpO2: 98% (05-27-20 @ 05:31) (98% - 98%)    Gen: NAD; at baseline confusion; can follow commands  HEENT: PERRL, EOMI, mouth clr, nose clr  Neck: no nodes, no JVD, thyroid nl  lungs: clr  hrt: s1 s2 rrr + 2/6 sys murmur c/w AS  abd: soft, NT/ND, no HS megaly  ext: no edema, no c/c  neuro: aa ox2, cn intact, can move all 4 ext; slight tremor rt hand    LABS:                        12.0    (    90.5   5.12  )-----------( ---------      216      ( 27 May 2020 06:11 )             33.2    (    12.5     139   (   102   (   111      05-27-20 @ 06:11  ----------------------               3.8   (   26   (   21                             -----                        0.8  Ca  9.2   Mg  2.0    P   --     LFT  6.5  (  0.4  (  20       05-27-20 @ 06:11  -------------------------  3.7  (  108  (  8    RADIOLOGY & ADDITIONAL TESTS:      MEDICATIONS:    acetaminophen   Tablet .. 650 milliGRAM(s) Oral every 6 hours PRN  amLODIPine   Tablet 2.5 milliGRAM(s) Oral <User Schedule>  aspirin enteric coated 81 milliGRAM(s) Oral daily  atorvastatin 20 milliGRAM(s) Oral at bedtime  BACItracin   Ointment 1 Application(s) Topical daily  carbidopa/levodopa  25/100 1 Tablet(s) Oral every 6 hours  famotidine  Oral Tab/Cap - Peds 20 milliGRAM(s) Oral daily  heparin   Injectable 5000 Unit(s) SubCutaneous every 8 hours  insulin lispro (HumaLOG) corrective regimen sliding scale   SubCutaneous three times a day before meals  latanoprost 0.005% Ophthalmic Solution 1 Drop(s) Both EYES at bedtime  magnesium oxide 400 milliGRAM(s) Oral three times a day with meals  pyridostigmine  milliGRAM(s) Oral at bedtime  senna 2 Tablet(s) Oral at bedtime  timolol 0.5% Solution 1 Drop(s) Both EYES at bedtime

## 2020-05-28 ENCOUNTER — TRANSCRIPTION ENCOUNTER (OUTPATIENT)
Age: 65
End: 2020-05-28

## 2020-05-28 LAB
ALBUMIN SERPL ELPH-MCNC: 3.9 G/DL — SIGNIFICANT CHANGE UP (ref 3.5–5.2)
ALP SERPL-CCNC: 112 U/L — SIGNIFICANT CHANGE UP (ref 30–115)
ALT FLD-CCNC: 6 U/L — SIGNIFICANT CHANGE UP (ref 0–41)
ANION GAP SERPL CALC-SCNC: 14 MMOL/L — SIGNIFICANT CHANGE UP (ref 7–14)
AST SERPL-CCNC: 19 U/L — SIGNIFICANT CHANGE UP (ref 0–41)
BASOPHILS # BLD AUTO: 0.03 K/UL — SIGNIFICANT CHANGE UP (ref 0–0.2)
BASOPHILS NFR BLD AUTO: 0.4 % — SIGNIFICANT CHANGE UP (ref 0–1)
BILIRUB SERPL-MCNC: 0.5 MG/DL — SIGNIFICANT CHANGE UP (ref 0.2–1.2)
BUN SERPL-MCNC: 23 MG/DL — HIGH (ref 10–20)
CALCIUM SERPL-MCNC: 9.8 MG/DL — SIGNIFICANT CHANGE UP (ref 8.5–10.1)
CHLORIDE SERPL-SCNC: 104 MMOL/L — SIGNIFICANT CHANGE UP (ref 98–110)
CO2 SERPL-SCNC: 24 MMOL/L — SIGNIFICANT CHANGE UP (ref 17–32)
CREAT SERPL-MCNC: 1.2 MG/DL — SIGNIFICANT CHANGE UP (ref 0.7–1.5)
EOSINOPHIL # BLD AUTO: 0.14 K/UL — SIGNIFICANT CHANGE UP (ref 0–0.7)
EOSINOPHIL NFR BLD AUTO: 1.8 % — SIGNIFICANT CHANGE UP (ref 0–8)
GLUCOSE BLDC GLUCOMTR-MCNC: 109 MG/DL — HIGH (ref 70–99)
GLUCOSE BLDC GLUCOMTR-MCNC: 116 MG/DL — HIGH (ref 70–99)
GLUCOSE SERPL-MCNC: 109 MG/DL — HIGH (ref 70–99)
HCT VFR BLD CALC: 38.1 % — LOW (ref 42–52)
HGB BLD-MCNC: 13.4 G/DL — LOW (ref 14–18)
IMM GRANULOCYTES NFR BLD AUTO: 0.5 % — HIGH (ref 0.1–0.3)
LYMPHOCYTES # BLD AUTO: 0.88 K/UL — LOW (ref 1.2–3.4)
LYMPHOCYTES # BLD AUTO: 11.2 % — LOW (ref 20.5–51.1)
MAGNESIUM SERPL-MCNC: 2 MG/DL — SIGNIFICANT CHANGE UP (ref 1.8–2.4)
MCHC RBC-ENTMCNC: 32.4 PG — HIGH (ref 27–31)
MCHC RBC-ENTMCNC: 35.2 G/DL — SIGNIFICANT CHANGE UP (ref 32–37)
MCV RBC AUTO: 92 FL — SIGNIFICANT CHANGE UP (ref 80–94)
MONOCYTES # BLD AUTO: 0.81 K/UL — HIGH (ref 0.1–0.6)
MONOCYTES NFR BLD AUTO: 10.3 % — HIGH (ref 1.7–9.3)
NEUTROPHILS # BLD AUTO: 5.98 K/UL — SIGNIFICANT CHANGE UP (ref 1.4–6.5)
NEUTROPHILS NFR BLD AUTO: 75.8 % — HIGH (ref 42.2–75.2)
NRBC # BLD: 0 /100 WBCS — SIGNIFICANT CHANGE UP (ref 0–0)
PLATELET # BLD AUTO: 228 K/UL — SIGNIFICANT CHANGE UP (ref 130–400)
POTASSIUM SERPL-MCNC: 4.2 MMOL/L — SIGNIFICANT CHANGE UP (ref 3.5–5)
POTASSIUM SERPL-SCNC: 4.2 MMOL/L — SIGNIFICANT CHANGE UP (ref 3.5–5)
PROT SERPL-MCNC: 6.7 G/DL — SIGNIFICANT CHANGE UP (ref 6–8)
RBC # BLD: 4.14 M/UL — LOW (ref 4.7–6.1)
RBC # FLD: 12.9 % — SIGNIFICANT CHANGE UP (ref 11.5–14.5)
SARS-COV-2 RNA SPEC QL NAA+PROBE: SIGNIFICANT CHANGE UP
SODIUM SERPL-SCNC: 142 MMOL/L — SIGNIFICANT CHANGE UP (ref 135–146)
WBC # BLD: 7.88 K/UL — SIGNIFICANT CHANGE UP (ref 4.8–10.8)
WBC # FLD AUTO: 7.88 K/UL — SIGNIFICANT CHANGE UP (ref 4.8–10.8)

## 2020-05-28 PROCEDURE — 99233 SBSQ HOSP IP/OBS HIGH 50: CPT

## 2020-05-28 PROCEDURE — 99232 SBSQ HOSP IP/OBS MODERATE 35: CPT

## 2020-05-28 RX ORDER — SODIUM CHLORIDE 9 MG/ML
1000 INJECTION, SOLUTION INTRAVENOUS
Refills: 0 | Status: DISCONTINUED | OUTPATIENT
Start: 2020-05-28 | End: 2020-05-29

## 2020-05-28 RX ORDER — SENNA PLUS 8.6 MG/1
2 TABLET ORAL
Qty: 0 | Refills: 0 | DISCHARGE
Start: 2020-05-28

## 2020-05-28 RX ORDER — MIDODRINE HYDROCHLORIDE 2.5 MG/1
5 TABLET ORAL
Refills: 0 | Status: DISCONTINUED | OUTPATIENT
Start: 2020-05-28 | End: 2020-05-30

## 2020-05-28 RX ORDER — QUETIAPINE FUMARATE 200 MG/1
0 TABLET, FILM COATED ORAL
Qty: 0 | Refills: 0 | DISCHARGE

## 2020-05-28 RX ORDER — PYRIDOSTIGMINE BROMIDE 60 MG/5ML
1 SOLUTION ORAL
Qty: 0 | Refills: 0 | DISCHARGE
Start: 2020-05-28

## 2020-05-28 RX ORDER — MAGNESIUM OXIDE 400 MG ORAL TABLET 241.3 MG
1 TABLET ORAL
Qty: 0 | Refills: 0 | DISCHARGE
Start: 2020-05-28

## 2020-05-28 RX ORDER — ACETAMINOPHEN 500 MG
650 TABLET ORAL EVERY 6 HOURS
Refills: 0 | Status: DISCONTINUED | OUTPATIENT
Start: 2020-05-28 | End: 2020-05-28

## 2020-05-28 RX ORDER — SODIUM CHLORIDE 9 MG/ML
500 INJECTION, SOLUTION INTRAVENOUS ONCE
Refills: 0 | Status: DISCONTINUED | OUTPATIENT
Start: 2020-05-28 | End: 2020-05-28

## 2020-05-28 RX ORDER — QUETIAPINE FUMARATE 200 MG/1
2 TABLET, FILM COATED ORAL
Qty: 0 | Refills: 0 | DISCHARGE

## 2020-05-28 RX ORDER — DOCUSATE SODIUM 100 MG
0 CAPSULE ORAL
Qty: 0 | Refills: 0 | DISCHARGE

## 2020-05-28 RX ORDER — QUETIAPINE FUMARATE 200 MG/1
1 TABLET, FILM COATED ORAL
Qty: 0 | Refills: 0 | DISCHARGE
Start: 2020-05-28

## 2020-05-28 RX ORDER — HALOPERIDOL DECANOATE 100 MG/ML
0 INJECTION INTRAMUSCULAR
Qty: 0 | Refills: 0 | DISCHARGE

## 2020-05-28 RX ADMIN — CARBIDOPA AND LEVODOPA 1 TABLET(S): 25; 100 TABLET ORAL at 00:39

## 2020-05-28 RX ADMIN — ENOXAPARIN SODIUM 40 MILLIGRAM(S): 100 INJECTION SUBCUTANEOUS at 11:33

## 2020-05-28 RX ADMIN — Medication 81 MILLIGRAM(S): at 11:33

## 2020-05-28 RX ADMIN — QUETIAPINE FUMARATE 25 MILLIGRAM(S): 200 TABLET, FILM COATED ORAL at 16:19

## 2020-05-28 RX ADMIN — Medication 1 DROP(S): at 21:12

## 2020-05-28 RX ADMIN — ATORVASTATIN CALCIUM 20 MILLIGRAM(S): 80 TABLET, FILM COATED ORAL at 21:11

## 2020-05-28 RX ADMIN — FAMOTIDINE 20 MILLIGRAM(S): 10 INJECTION INTRAVENOUS at 11:33

## 2020-05-28 RX ADMIN — PYRIDOSTIGMINE BROMIDE 180 MILLIGRAM(S): 60 SOLUTION ORAL at 21:11

## 2020-05-28 RX ADMIN — LATANOPROST 1 DROP(S): 0.05 SOLUTION/ DROPS OPHTHALMIC; TOPICAL at 21:12

## 2020-05-28 RX ADMIN — CARBIDOPA AND LEVODOPA 1 TABLET(S): 25; 100 TABLET ORAL at 06:01

## 2020-05-28 RX ADMIN — DROXIDOPA 200 MILLIGRAM(S): 100 CAPSULE ORAL at 16:19

## 2020-05-28 RX ADMIN — CARBIDOPA AND LEVODOPA 1 TABLET(S): 25; 100 TABLET ORAL at 11:33

## 2020-05-28 RX ADMIN — CARBIDOPA AND LEVODOPA 1 TABLET(S): 25; 100 TABLET ORAL at 17:22

## 2020-05-28 RX ADMIN — Medication 1 APPLICATION(S): at 13:10

## 2020-05-28 RX ADMIN — MAGNESIUM OXIDE 400 MG ORAL TABLET 400 MILLIGRAM(S): 241.3 TABLET ORAL at 11:33

## 2020-05-28 RX ADMIN — DROXIDOPA 200 MILLIGRAM(S): 100 CAPSULE ORAL at 12:48

## 2020-05-28 RX ADMIN — DROXIDOPA 200 MILLIGRAM(S): 100 CAPSULE ORAL at 13:11

## 2020-05-28 RX ADMIN — SENNA PLUS 2 TABLET(S): 8.6 TABLET ORAL at 21:11

## 2020-05-28 NOTE — PROGRESS NOTE ADULT - SUBJECTIVE AND OBJECTIVE BOX
MAYRA GARDNER  64y  Male      Patient is a 64y old  Male who presents with a chief complaint of Fall (27 May 2020 22:49)      INTERVAL HPI/OVERNIGHT EVENTS:  no acute events overnight, pt did not sleep at night although he was not agitated, this AM he was rapid for unresponsiveness likely 2ry to orthostatic hypotensino          Vital Signs Last 24 Hrs  T(C): 36.4 (28 May 2020 04:55), Max: 36.4 (28 May 2020 04:55)  T(F): 97.5 (28 May 2020 04:55), Max: 97.5 (28 May 2020 04:55)  HR: 76 (28 May 2020 05:55) (76 - 83)  BP: 116/60 (28 May 2020 07:07) (97/59 - 154/85)  RR: 18 (28 May 2020 07:07) (18 - 18)  SpO2: 97% (28 May 2020 05:55) (97% - 97%)    PHYSICAL EXAM:  General: No acute distress. abdominal binder in bed, femoral compression stocking  HEENT: Pupils, reactive to light   PULM: Clear to auscultation bilaterally, no significant sputum production.  CVS: Regular rate and rhythm, no murmurs, rubs, or gallops.  GI: Soft, nondistended, nontender, no masses.  MSK: No edema, nontender. Silver stockings on LE, Cogwheel rigidity   SKIN: Warm and well perfused, no rashes noted.  PSYCH: AAO1-2, confused   NEURO: Nn  focal           LABS:                        13.4   7.88  )-----------( 228      ( 28 May 2020 06:30 )             38.1     05-28    142  |  104  |  23<H>  ----------------------------<  109<H>  4.2   |  24  |  1.2    Ca    9.8      28 May 2020 06:30  Mg     2.0     05-28    TPro  6.7  /  Alb  3.9  /  TBili  0.5  /  DBili  x   /  AST  19  /  ALT  6   /  AlkPhos  112  05-28          CAPILLARY BLOOD GLUCOSE      POCT Blood Glucose.: 116 mg/dL (28 May 2020 10:32)  POCT Blood Glucose.: 109 mg/dL (28 May 2020 07:27)  POCT Blood Glucose.: 182 mg/dL (27 May 2020 11:41)      RADIOLOGY & ADDITIONAL TESTS:    Imaging Personally Reviewed:  [ ] YES  [ ] NO

## 2020-05-28 NOTE — PROGRESS NOTE ADULT - ASSESSMENT
65 y/o man with PMH of Parkinson's disease (w/ mild dementia), DM2, and HTN who was BIBEMS from [facility] for multiple falls on the day of presentation, associated with a subacute decline in mental and functional status per nursing home report.    # s/p multiple falls / hypertensive orthostatic hypotension  - continue compression stockings and abdominal binder   -f/u PT  -d/c amlopdipine    # Parkinson's Disease with altered mental status (psychosis)  - sinemet,  pimavanserin increased to 34 qhs, trial of pyridostigmine cr 180qhs, increase carbidopa 200mg AM and 4pm  - avoid haldol and ativan  - Neuro f/u     #s/p IV hydration with resolution of LUBNA and rhabdomyolysis     DM type 2 - controlled                                                                                                                                                                                                                                                                                                       FULL CODE status   from [facility]

## 2020-05-28 NOTE — DISCHARGE NOTE PROVIDER - HOSPITAL COURSE
65 y/o man with PMH of Parkinson's disease (w/ mild dementia), DM2, and HTN who was BIBEMS from [facility] for multiple falls on the day of presentation, associated with a subacute decline in mental and functional status per nursing home, likely 2ry to orthostatic hypotension 2ry to parkinson disease with hypertension. pt still having positive orthostatics with slight improvement s/p fluid hydration, compression stocking and abdominal binder. pt seen by neurology for AMS and parkinson medication adjusted, will c/w with sinemet 25/100 q6, and pyridostigmine 180mg daily , and droxidoopa 200mg at 8am and 4 pm. 63 y/o man with PMH of Parkinson's disease (w/ mild dementia), DM2, and HTN who was BIBEMS from [facility] for multiple falls on the day of presentation, associated with a subacute decline in mental and functional status per nursing home, likely 2ry to orthostatic hypotension 2ry to parkinson disease with hypertension. pt still having positive orthostatics with slight improvement s/p fluid hydration, compression stocking and abdominal binder, droxidoopa 200mg at 8am and 4 pm and midodrine if systolic BP <150 at 8am and 12am, pt seen by neurology for AMS and Parkinson medication adjusted, will c/w with sinemet 25/100 q6, and pyridostigmine 180mg daily.

## 2020-05-28 NOTE — DISCHARGE NOTE PROVIDER - NSDCMRMEDTOKEN_GEN_ALL_CORE_FT
Aspir 81 oral delayed release tablet: 1 tab(s) orally once a day  atorvastatin 20 mg oral tablet: 1 tab(s) orally once a day  carbidopa-levodopa 25 mg-100 mg oral tablet: 1 tab(s) orally 3 times a day  famotidine 20 mg oral tablet: orally once a day  fludrocortisone 0.1 mg oral tablet: 1 tab(s) orally once a day  glipiZIDE 5 mg oral tablet: 1 tab(s) orally once a day  Lutein 20 mg oral capsule: 1 cap(s) orally once a day  magnesium oxide 400 mg (241.3 mg elemental magnesium) oral tablet: 1 tab(s) orally once a day  metFORMIN 1000 mg oral tablet: 1 tab(s) orally 2 times a day  PreserVision AREDS 2 oral capsule: orally 2 times a day  pyridostigmine 180 mg oral tablet, extended release: 1 tab(s) orally once a day (at bedtime)  QUEtiapine 25 mg oral tablet: 1 tab(s) orally once a day (at bedtime), As needed, agitation  senna oral tablet: 2 tab(s) orally once a day (at bedtime)  timolol-latanoprost 0.5%-0.005% ophthalmic solution: to each affected eye once a day (at bedtime) Aspir 81 oral delayed release tablet: 1 tab(s) orally once a day  atorvastatin 20 mg oral tablet: 1 tab(s) orally once a day  carbidopa-levodopa 25 mg-100 mg oral tablet: 1 tab(s) orally every 6 hours  droxidopa 100 mg oral capsule: 2 cap(s) orally   famotidine 20 mg oral tablet: orally once a day  magnesium oxide 400 mg (241.3 mg elemental magnesium) oral tablet: 1 tab(s) orally once a day  midodrine 5 mg oral tablet: 1 tab(s) at 8am, 12 noon orally , As needed, give if SBP &lt;150, hold if &gt;150  pimavanserin 34 mg oral capsule: 1 cap(s) orally once a day  pyridostigmine 180 mg oral tablet, extended release: 1 tab(s) orally once a day (at bedtime)  QUEtiapine 25 mg oral tablet: 1 tab(s) orally once a day (at bedtime), As needed, agitation  senna oral tablet: 2 tab(s) orally once a day (at bedtime)  timolol-latanoprost 0.5%-0.005% ophthalmic solution: to each affected eye once a day (at bedtime)

## 2020-05-28 NOTE — DISCHARGE NOTE PROVIDER - CARE PROVIDER_API CALL
Vinh De Souza  EEG/EPILEPSY  [address]  [place] [address]  Phone: [phone]  Fax: [phone]  Follow Up Time: Routine

## 2020-05-28 NOTE — PROGRESS NOTE ADULT - SUBJECTIVE AND OBJECTIVE BOX
MAYRA GARDNER  64y  Male  ***My note supersedes ALL resident notes that I sign.  My corrections for their notes are in my note.***    I can be reached directly on [employer] [id number]. My office number is [phone]. My personal cell number is [phone].    INTERVAL EVENTS: Here for f/u of orthostasis. Pt was dizzy upon standing today and BP dropped into 70s. Pt responded to fluids. He is OK in bed or chair. He did not sleep much in early night, but was not combative either. He eventually fell asleep. Behavior has been acceptable and he remains off 1:1.    T(F): 96.6 (05-28-20 @ 12:50), Max: 97.5 (05-28-20 @ 04:55)  HR: 67 (05-28-20 @ 12:50) (67 - 83)  BP: 115/54 (05-28-20 @ 12:50) (97/59 - 131/64)  RR: 18 (05-28-20 @ 12:50) (18 - 18)  SpO2: 97% (05-28-20 @ 05:55) (97% - 97%)    Gen: NAD; at baseline confusion; can follow commands  HEENT: PERRL, EOMI, mouth clr, nose clr  Neck: no nodes, no JVD, thyroid nl  lungs: clr  hrt: s1 s2 rrr + 2/6 sys murmur c/w AS  abd: soft, NT/ND, no HS megaly  ext: no edema, no c/c  neuro: aa ox2, cn intact, can move all 4 ext; slight tremor rt hand    LABS:                        13.4    (    92.0   7.88  )-----------( ---------      228      ( 28 May 2020 06:30 )             38.1    (    12.9     142   (   104   (   109      05-28-20 @ 06:30  ----------------------               4.2   (   24   (   23                             -----                        1.2  Ca  9.8   Mg  2.0    P   --     LFT  6.7  (  0.5  (  19       05-28-20 @ 06:30  -------------------------  3.9  (  112  (  6    RADIOLOGY & ADDITIONAL TESTS:      MEDICATIONS:    acetaminophen   Tablet .. 650 milliGRAM(s) Oral every 6 hours PRN  aspirin enteric coated 81 milliGRAM(s) Oral daily  atorvastatin 20 milliGRAM(s) Oral at bedtime  BACItracin   Ointment 1 Application(s) Topical daily  carbidopa/levodopa  25/100 1 Tablet(s) Oral every 6 hours  droxidopa 200 milliGRAM(s) Oral <User Schedule>  enoxaparin Injectable 40 milliGRAM(s) SubCutaneous daily  famotidine  Oral Tab/Cap - Peds 20 milliGRAM(s) Oral daily  lactated ringers. 1000 milliLiter(s) IV Continuous <Continuous>  latanoprost 0.005% Ophthalmic Solution 1 Drop(s) Both EYES at bedtime  magnesium oxide 400 milliGRAM(s) Oral daily  midodrine. 5 milliGRAM(s) Oral <User Schedule> PRN  pyridostigmine  milliGRAM(s) Oral at bedtime  QUEtiapine 25 milliGRAM(s) Oral at bedtime PRN  senna 2 Tablet(s) Oral at bedtime  timolol 0.5% Solution 1 Drop(s) Both EYES at bedtime

## 2020-05-28 NOTE — PROGRESS NOTE ADULT - ASSESSMENT
63 y/o man with PMH of Parkinson's disease (w/ dementia), DM2, and HTN who was BIBEMS from [facility] [place] for multiple falls on the day of presentation, associated with a subacute decline in mental and functional status per Asst Arely report.    # s/p multiple falls likely due to severe orthostatic hypotension and PD (Shy-Drager syndrome)  Bun and Cr were rising again -> IVFs LR 70/hr  d/c HS dose of droxidopa, and cont 200mg at 8am and 200mg at 4pm  cont Mestinon ER 180mg po HS  add midodrine 5mg po q6am and 12pm: only give if sys BP < 150, otherwise hold dose; if giving at 150 cause HTN, will lower that parameter to 140  check orthostatics daily in AM -> I am more concerned w/ clinical response than the actual numbers of the BP  fall precautions  f/u physical therapy  continue compression stockings and abdominal binder  d/c norvasc    # Parkinson's Disease with dementia (prob Lewy-Body)   cont sinemet 25/100 po q6  d/w Neuro: c/w pimavanserin to 34mg po q24  avoid haldol and ativan  seroquel 25mg hs prn nighttime agitation/sundowning     # hypomagnesemia - better  cont w/ oral repletion (q24)    # DM type 2 - controlled off meds  can d/c insulin  can d/c FS  cont diabetic diet    # on asa 81mg q24 - consider d/c    # DLD on statin    # glaucoma: cont gtts    # normo anemia (mild) of chr dz    # DVT ppx: LMWH q24    # GI ppx: on pepcid    # FULL CODE - will have to have on-going eval for this, zoya if he worsens    # spoke w/ dtr, Mandi, about dx and plan; she is agreeable to long-term placement in SNF upon D/C;    Dispo: f/u neuro re tx of orthostatic BP; give IVFs; d/c FS; limit labs  eventually will need prob LT placement in NH (dementia unit) -> f/u CM (poss start as STR, then convert to LT? - PT eval)  ideally, also need HCP from family  ongoing Adv Dir discussion w/ family as clinical situation changes

## 2020-05-28 NOTE — DISCHARGE NOTE PROVIDER - NSDCCPCAREPLAN_GEN_ALL_CORE_FT
PRINCIPAL DISCHARGE DIAGNOSIS  Diagnosis: Fall  Assessment and Plan of Treatment: you fell secondary to orthostatic hypotesnion which mean that blood pressure drop on standing. please take medication as prescribd, stay hydrated and wear the abdominal binder and compression stocking on the lower ext, follow up with neurology clinic for parkinison within the next month or as need. follow these instruction   ?Stand up slowly and give your body time to adapt. This is especially important when you get out of bed in the morning. Start by sitting up and waiting a moment. Then swing your legs over the side of the bed and wait some more. When you do stand up, make sure you have something to hold onto in case you start to feel dizzy.  ?Make sure you drink enough fluids, especially in hot weather.  ?Put blocks under the posts at the head of your bed. This will raise your head above your heart slightly.  ?Wear "compression" stockings. The ones that go to your waist are most helpful, but they can be hard to use.  ?Avoid drinking a lot of alcohol.      SECONDARY DISCHARGE DIAGNOSES  Diagnosis: Pericardial effusion  Assessment and Plan of Treatment:     Diagnosis: Hypotension  Assessment and Plan of Treatment: PRINCIPAL DISCHARGE DIAGNOSIS  Diagnosis: Fall  Assessment and Plan of Treatment: you fell secondary to orthostatic hypotesnion which mean that blood pressure drop on standing. please take medication as prescribd, stay hydrated and wear the abdominal binder and compression stocking on the lower ext, your midodrine will be given at 6AM, and 12pm only if Sysyolic blood presssure is <150, follow up with neurology clinic for parkinison within the next month or as need. follow these instruction   ?Stand up slowly and give your body time to adapt. This is especially important when you get out of bed in the morning. Start by sitting up and waiting a moment. Then swing your legs over the side of the bed and wait some more. When you do stand up, make sure you have something to hold onto in case you start to feel dizzy.  ?Make sure you drink enough fluids, especially in hot weather.  ?Put blocks under the posts at the head of your bed. This will raise your head above your heart slightly.  ?Wear "compression" stockings. The ones that go to your waist are most helpful, but they can be hard to use.  ?Avoid drinking a lot of alcohol.      SECONDARY DISCHARGE DIAGNOSES  Diagnosis: Pericardial effusion  Assessment and Plan of Treatment:     Diagnosis: Hypotension  Assessment and Plan of Treatment: PRINCIPAL DISCHARGE DIAGNOSIS  Diagnosis: Fall  Assessment and Plan of Treatment: you fell secondary to orthostatic hypotesnion which mean that blood pressure drop on standing. please take medication as prescribd, stay hydrated and wear the abdominal binder and compression stocking on the lower ext, your midodrine will be given at 6AM, and 12pm only if Sysyolic blood presssure is <150, follow up with neurology clinic for parkinison within the next month or as need. follow these instruction   ?Stand up slowly and give your body time to adapt. This is especially important when you get out of bed in the morning. Start by sitting up and waiting a moment. Then swing your legs over the side of the bed and wait some more. When you do stand up, make sure you have something to hold onto in case you start to feel dizzy.  ?Make sure you drink enough fluids, especially in hot weather.  ?Put blocks under the posts at the head of your bed. This will raise your head above your heart slightly.  ?Wear "compression" stockings. The ones that go to your waist are most helpful, but they can be hard to use.  ?Avoid drinking alcohol. PRINCIPAL DISCHARGE DIAGNOSIS  Diagnosis: Fall  Assessment and Plan of Treatment: Patient has repeated falls due to severe orthostatic hypotesnion which mean that blood pressure drop on standing. please take medication as prescribed, stay hydrated and wear the abdominal binder and compression stocking on the lower ext,   - Take midodrine will be given at 6AM, and 12pm only if Sysyolic blood presssure is <150,   - follow up with neurology clinic for parkinison within the next month or as need. follow these instruction   - Stand up slowly and give your body time to adapt. This is especially important when you get out of bed in the morning. Start by sitting up and waiting a moment. Then swing your legs over the side of the bed and wait some more. When you do stand up, make sure you have something to hold onto in case you start to feel dizzy.  -Make sure you drink enough fluids, especially in hot weather.  -Put blocks under the posts at the head of your bed. This will raise your head above your heart slightly.  -Wear "compression" stockings and abdominal binder AT ALL TIMES.   -Avoid drinking alcohol.      SECONDARY DISCHARGE DIAGNOSES  Diagnosis: LUBNA (acute kidney injury)  Assessment and Plan of Treatment: Patient had some worsening of renal function that is now improving likely due to repeat episodes of low blood pressure.   - Please get a BMP (blood work) for kidney within 1 week for further management.   - Monitor urinary status as unable to take flomax. Maldonado as needed for retention.    Diagnosis: Psychoses  Assessment and Plan of Treatment: Patient had episodes of acute agitation likely due to worsening Parkinsons.   - Please continue pimvaserin daily  - Can use seroquil ONCE A DAY for agitation at night, but avoid overusing given risk of further dropping BP.   - NO HALDOL. AVOID BENZO given dementia and likely Parkinsons vs. Lewy Body dementia

## 2020-05-28 NOTE — CHART NOTE - NSCHARTNOTEFT_GEN_A_CORE
rapid repsonse was called as pt was unresponsive after moving out of bed,  Hd stable /90 Hr 75 and fingerstick of 116, PE with no change since AM, pt was more alert after less than 2 min, able to follow command but slightly confused,    #orthostatic hypotension  -LR bolus 500 given  -abdominal binder and compression stocking

## 2020-05-29 LAB
ALBUMIN SERPL ELPH-MCNC: 3.6 G/DL — SIGNIFICANT CHANGE UP (ref 3.5–5.2)
ALP SERPL-CCNC: 106 U/L — SIGNIFICANT CHANGE UP (ref 30–115)
ALT FLD-CCNC: 8 U/L — SIGNIFICANT CHANGE UP (ref 0–41)
ANION GAP SERPL CALC-SCNC: 11 MMOL/L — SIGNIFICANT CHANGE UP (ref 7–14)
AST SERPL-CCNC: 16 U/L — SIGNIFICANT CHANGE UP (ref 0–41)
BASOPHILS # BLD AUTO: 0.04 K/UL — SIGNIFICANT CHANGE UP (ref 0–0.2)
BASOPHILS NFR BLD AUTO: 0.6 % — SIGNIFICANT CHANGE UP (ref 0–1)
BILIRUB SERPL-MCNC: 0.6 MG/DL — SIGNIFICANT CHANGE UP (ref 0.2–1.2)
BUN SERPL-MCNC: 29 MG/DL — HIGH (ref 10–20)
CALCIUM SERPL-MCNC: 9 MG/DL — SIGNIFICANT CHANGE UP (ref 8.5–10.1)
CHLORIDE SERPL-SCNC: 102 MMOL/L — SIGNIFICANT CHANGE UP (ref 98–110)
CO2 SERPL-SCNC: 23 MMOL/L — SIGNIFICANT CHANGE UP (ref 17–32)
CREAT SERPL-MCNC: 1.5 MG/DL — SIGNIFICANT CHANGE UP (ref 0.7–1.5)
EOSINOPHIL # BLD AUTO: 0.23 K/UL — SIGNIFICANT CHANGE UP (ref 0–0.7)
EOSINOPHIL NFR BLD AUTO: 3.7 % — SIGNIFICANT CHANGE UP (ref 0–8)
GLUCOSE BLDC GLUCOMTR-MCNC: 108 MG/DL — HIGH (ref 70–99)
GLUCOSE BLDC GLUCOMTR-MCNC: 111 MG/DL — HIGH (ref 70–99)
GLUCOSE BLDC GLUCOMTR-MCNC: 117 MG/DL — HIGH (ref 70–99)
GLUCOSE BLDC GLUCOMTR-MCNC: 131 MG/DL — HIGH (ref 70–99)
GLUCOSE BLDC GLUCOMTR-MCNC: 134 MG/DL — HIGH (ref 70–99)
GLUCOSE SERPL-MCNC: 94 MG/DL — SIGNIFICANT CHANGE UP (ref 70–99)
HCT VFR BLD CALC: 34.4 % — LOW (ref 42–52)
HGB BLD-MCNC: 12.3 G/DL — LOW (ref 14–18)
IMM GRANULOCYTES NFR BLD AUTO: 0.2 % — SIGNIFICANT CHANGE UP (ref 0.1–0.3)
LYMPHOCYTES # BLD AUTO: 1.02 K/UL — LOW (ref 1.2–3.4)
LYMPHOCYTES # BLD AUTO: 16.4 % — LOW (ref 20.5–51.1)
MAGNESIUM SERPL-MCNC: 1.9 MG/DL — SIGNIFICANT CHANGE UP (ref 1.8–2.4)
MCHC RBC-ENTMCNC: 32.4 PG — HIGH (ref 27–31)
MCHC RBC-ENTMCNC: 35.8 G/DL — SIGNIFICANT CHANGE UP (ref 32–37)
MCV RBC AUTO: 90.5 FL — SIGNIFICANT CHANGE UP (ref 80–94)
MONOCYTES # BLD AUTO: 0.61 K/UL — HIGH (ref 0.1–0.6)
MONOCYTES NFR BLD AUTO: 9.8 % — HIGH (ref 1.7–9.3)
NEUTROPHILS # BLD AUTO: 4.32 K/UL — SIGNIFICANT CHANGE UP (ref 1.4–6.5)
NEUTROPHILS NFR BLD AUTO: 69.3 % — SIGNIFICANT CHANGE UP (ref 42.2–75.2)
NRBC # BLD: 0 /100 WBCS — SIGNIFICANT CHANGE UP (ref 0–0)
PLATELET # BLD AUTO: 198 K/UL — SIGNIFICANT CHANGE UP (ref 130–400)
POTASSIUM SERPL-MCNC: 4.4 MMOL/L — SIGNIFICANT CHANGE UP (ref 3.5–5)
POTASSIUM SERPL-SCNC: 4.4 MMOL/L — SIGNIFICANT CHANGE UP (ref 3.5–5)
PROT SERPL-MCNC: 6.2 G/DL — SIGNIFICANT CHANGE UP (ref 6–8)
RBC # BLD: 3.8 M/UL — LOW (ref 4.7–6.1)
RBC # FLD: 13.1 % — SIGNIFICANT CHANGE UP (ref 11.5–14.5)
SODIUM SERPL-SCNC: 136 MMOL/L — SIGNIFICANT CHANGE UP (ref 135–146)
WBC # BLD: 6.23 K/UL — SIGNIFICANT CHANGE UP (ref 4.8–10.8)
WBC # FLD AUTO: 6.23 K/UL — SIGNIFICANT CHANGE UP (ref 4.8–10.8)

## 2020-05-29 PROCEDURE — 99233 SBSQ HOSP IP/OBS HIGH 50: CPT

## 2020-05-29 RX ORDER — SODIUM CHLORIDE 9 MG/ML
1000 INJECTION, SOLUTION INTRAVENOUS
Refills: 0 | Status: DISCONTINUED | OUTPATIENT
Start: 2020-05-29 | End: 2020-05-30

## 2020-05-29 RX ORDER — PIMAVANSERIN TARTRATE 10 MG/1
34 TABLET, COATED ORAL DAILY
Refills: 0 | Status: DISCONTINUED | OUTPATIENT
Start: 2020-05-29 | End: 2020-05-30

## 2020-05-29 RX ADMIN — ATORVASTATIN CALCIUM 20 MILLIGRAM(S): 80 TABLET, FILM COATED ORAL at 21:08

## 2020-05-29 RX ADMIN — CARBIDOPA AND LEVODOPA 1 TABLET(S): 25; 100 TABLET ORAL at 17:00

## 2020-05-29 RX ADMIN — FAMOTIDINE 20 MILLIGRAM(S): 10 INJECTION INTRAVENOUS at 11:19

## 2020-05-29 RX ADMIN — MIDODRINE HYDROCHLORIDE 5 MILLIGRAM(S): 2.5 TABLET ORAL at 16:55

## 2020-05-29 RX ADMIN — Medication 1 DROP(S): at 21:08

## 2020-05-29 RX ADMIN — ENOXAPARIN SODIUM 40 MILLIGRAM(S): 100 INJECTION SUBCUTANEOUS at 11:21

## 2020-05-29 RX ADMIN — SENNA PLUS 2 TABLET(S): 8.6 TABLET ORAL at 21:09

## 2020-05-29 RX ADMIN — CARBIDOPA AND LEVODOPA 1 TABLET(S): 25; 100 TABLET ORAL at 05:42

## 2020-05-29 RX ADMIN — Medication 1 APPLICATION(S): at 11:20

## 2020-05-29 RX ADMIN — DROXIDOPA 200 MILLIGRAM(S): 100 CAPSULE ORAL at 15:03

## 2020-05-29 RX ADMIN — MIDODRINE HYDROCHLORIDE 5 MILLIGRAM(S): 2.5 TABLET ORAL at 11:17

## 2020-05-29 RX ADMIN — DROXIDOPA 200 MILLIGRAM(S): 100 CAPSULE ORAL at 11:22

## 2020-05-29 RX ADMIN — LATANOPROST 1 DROP(S): 0.05 SOLUTION/ DROPS OPHTHALMIC; TOPICAL at 21:08

## 2020-05-29 RX ADMIN — QUETIAPINE FUMARATE 25 MILLIGRAM(S): 200 TABLET, FILM COATED ORAL at 00:34

## 2020-05-29 RX ADMIN — CARBIDOPA AND LEVODOPA 1 TABLET(S): 25; 100 TABLET ORAL at 11:19

## 2020-05-29 RX ADMIN — PYRIDOSTIGMINE BROMIDE 180 MILLIGRAM(S): 60 SOLUTION ORAL at 21:10

## 2020-05-29 RX ADMIN — MAGNESIUM OXIDE 400 MG ORAL TABLET 400 MILLIGRAM(S): 241.3 TABLET ORAL at 11:18

## 2020-05-29 RX ADMIN — Medication 81 MILLIGRAM(S): at 11:19

## 2020-05-29 NOTE — PROGRESS NOTE ADULT - SUBJECTIVE AND OBJECTIVE BOX
MAYRA GARDNER  64y  Male      Patient is a 64y old  Male who presents with a chief complaint of Fall (28 May 2020 16:44)      INTERVAL HPI/OVERNIGHT EVENTS:  no acute events overnight ,    Vital Signs Last 24 Hrs  T(C): 36.2 (29 May 2020 05:05), Max: 36.2 (28 May 2020 20:48)  T(F): 97.1 (29 May 2020 05:05), Max: 97.1 (28 May 2020 20:48)  HR: 72 (29 May 2020 05:05) (72 - 75)  BP: 133/84 (29 May 2020 05:05) (118/61 - 133/84)  RR: 18 (29 May 2020 05:05) (18 - 18)      PHYSICAL EXAM:          LABS:                        12.3   6.23  )-----------( 198      ( 29 May 2020 06:40 )             34.4     05-29    136  |  102  |  29<H>  ----------------------------<  94  4.4   |  23  |  1.5    Ca    9.0      29 May 2020 06:40  Mg     1.9     05-29    TPro  6.2  /  Alb  3.6  /  TBili  0.6  /  DBili  x   /  AST  16  /  ALT  8   /  AlkPhos  106  05-29          CAPILLARY BLOOD GLUCOSE      POCT Blood Glucose.: 111 mg/dL (29 May 2020 11:12)  POCT Blood Glucose.: 108 mg/dL (29 May 2020 08:12)      RADIOLOGY & ADDITIONAL TESTS:    Imaging Personally Reviewed:  [ ] YES  [ ] NO

## 2020-05-29 NOTE — PROGRESS NOTE ADULT - SUBJECTIVE AND OBJECTIVE BOX
MAYRA GARDNER  64y  Male  ***My note supersedes ALL resident notes that I sign.  My corrections for their notes are in my note.***    I can be reached directly on [employer] [id number]. My office number is [phone]. My personal cell number is [phone].    INTERVAL EVENTS: Here for of orthostasis. Pt had orthostatic episode yesterday afternoon. Not sure if pt is drinking enough. Not sure if pt is urinating well. Pt was a little agitated last night and got seroquel w/ good effect. Pt sleeping this am. Last night pt spit out some of his meds.    T(F): 97.1 (05-29-20 @ 05:05), Max: 97.1 (05-28-20 @ 20:48)  HR: 72 (05-29-20 @ 05:05) (67 - 75)  BP: 133/84 (05-29-20 @ 05:05) (115/54 - 133/84)  RR: 18 (05-29-20 @ 05:05) (18 - 18)  SpO2: --    Gen: NAD; at baseline confusion; can follow commands  HEENT: PERRL, EOMI, mouth clr, nose clr  Neck: no nodes, no JVD, thyroid nl  lungs: clr  hrt: s1 s2 rrr + 2/6 sys murmur c/w AS  abd: soft, NT/ND, no HS megaly  ext: no edema, no c/c  neuro: aa ox1-2    LABS:                        12.3    (    90.5   6.23  )-----------( ---------      198      ( 29 May 2020 06:40 )             34.4    (    13.1     136   (   102   (   94      05-29-20 @ 06:40  ----------------------               4.4   (   23   (   29                             -----                        1.5  Ca  9.0   Mg  1.9    P   --     Creatinine:   1.5 (05-29 @ 06:40) - worse  GFR (AA):     56  GFR (non AA): 48    Creatinine:   1.2 (05-28 @ 06:30)  GFR (AA):     74  GFR (non AA): 64    Creatinine:   0.8 (05-27 @ 06:11)  GFR (AA):     109  GFR (non AA): 94    Creatinine:   0.7 (05-26 @ 06:30)  GFR (AA):     116  GFR (non AA): 100    Creatinine:   0.9 (05-25 @ 05:34)  GFR (AA):     104  GFR (non AA): 90      LFT  6.2  (  0.6  (  16       05-29-20 @ 06:40  -------------------------  3.6  (  106  (  8    RADIOLOGY & ADDITIONAL TESTS:    MEDICATIONS:    acetaminophen   Tablet .. 650 milliGRAM(s) Oral every 6 hours PRN  aspirin enteric coated 81 milliGRAM(s) Oral daily  atorvastatin 20 milliGRAM(s) Oral at bedtime  BACItracin   Ointment 1 Application(s) Topical daily  carbidopa/levodopa  25/100 1 Tablet(s) Oral every 6 hours  droxidopa 200 milliGRAM(s) Oral <User Schedule>  enoxaparin Injectable 40 milliGRAM(s) SubCutaneous daily  famotidine  Oral Tab/Cap - Peds 20 milliGRAM(s) Oral daily  lactated ringers. 1000 milliLiter(s) IV Continuous <Continuous>  latanoprost 0.005% Ophthalmic Solution 1 Drop(s) Both EYES at bedtime  magnesium oxide 400 milliGRAM(s) Oral daily  midodrine. 5 milliGRAM(s) Oral <User Schedule> PRN  pyridostigmine  milliGRAM(s) Oral at bedtime  QUEtiapine 25 milliGRAM(s) Oral at bedtime PRN  senna 2 Tablet(s) Oral at bedtime  timolol 0.5% Solution 1 Drop(s) Both EYES at bedtime

## 2020-05-29 NOTE — CHART NOTE - NSCHARTNOTEFT_GEN_A_CORE
Registered Dietitian Follow-Up     Patient Profile Reviewed                           Yes [x]   No []     Nutrition History Previously Obtained        Yes [x]  No []       Pertinent Subjective Information:  -Pt sleeping very soundly, unable to wake. Spoke to PCA who reports she did not feed pt breakfast today as he appeared very sleepy and lethargic and was worried about safe PO. She offered trial of solids and liquids to pt but pt didn't open his mouth to feed. Attempted to feed again after morning rounds but same instance. Also notes that yesterday pt mainly drinking liquids and 50% or less of meals. Did not drink Glucerna yesterday.      Pertinent Medical Interventions:  1. Rapid Response 5/28: pt moved from bed to chair likely d/t orthostatic hypotension   --s/p multiple falls likely d/t  severe orthostatic hypotension and Parkinson's Disease   2. Parkinson's Disease with dementia--Neuro following   3. HypoMg--improved with PO repletion   4. T2DM, controlled      Diet order: CHO Consistent + Glucerna BID      Anthropometrics:  - Ht. 187.96cm   - Wt. 94.7kg (5/24) vs 88.5kg (5/18). wt gain unlikely accurate as no edema present and PO intake has not been optimal. likely bedscale error   - BMI  - IBW     Pertinent Lab Data: (5/29/2020) RBC 3.80, H/H 12.3/34.4, POCT 108/111, glucose 94 (WNL), BUN 29, GFR 48     Pertinent Meds: lovenox, aspirin, lactated ringers @70mL/hr, proamatine, mag-ox, atorvastatin, senna     Physical Findings:  - Appearance: sleeping soundly   - GI function: fecal incontinence with LBM 5/29   - Tubes: n/a   - Oral/Mouth cavity: consider SLP assessment for safe intake   - Skin: intact, no edema      Nutrition Requirements  Weight Used: 88.5kg (continued from RD initial assessment)     kcal: 5058-4269 (MSJ x 1.2-1.3 AF)   protein:  g (1-1.2 g/kg)   fluid: 1mL/kcal or per LIP     Nutrient Intake: 0% consumed this morning, <50% yesterday      [x] Previous Nutrition Diagnosis: inadequate energy intake             [x] Ongoing          [] Resolved     Nutrition Intervention: coordination of care, meals and snacks, medical food supplements  Recommend:  1. Consider SLP assessment if pt appears lethargic at meal times. Only feed if pt alert enough for safe feeding.   2. Consider d/c CHO Consistent restriction if FS remains <180mg/dL and pt with continued poor PO intake.   3. Increase Glucerna to TID.     Goal/Expected Outcome: Pt to tolerate >50% meals and supplements upon f/u in 4 days      Indicator/Monitoring: diet order, energy intake, nutrition related labs, body composition, NFPF

## 2020-05-29 NOTE — PROGRESS NOTE ADULT - ASSESSMENT
63 y/o man with PMH of Parkinson's disease (w/ mild dementia), DM2, and HTN who was BIBEMS from [facility] for multiple falls on the day of presentation, associated with a subacute decline in mental and functional status per nursing home report.    # s/p multiple falls / hypertensive orthostatic hypotension  - continue compression stockings and abdominal binder   -f/u PT  -d/c amlodipine    # Parkinson's Disease with altered mental status (psychosis)  - sinemet,  pimavanserin increased to 34 qhs, trial of pyridostigmine cr 180qhs, increase carbidopa 200mg AM and 4pm  - avoid haldol and ativan  - Neuro f/u   -seroquel 25mg hs prn nighttime agitation/sundowning    #s/p IV hydration with resolution of LUBNA and rhabdomyolysis, Now LUBNA again unknown etiology   -on IVF  -urine studies ordered  -s/p straight cath 400cc and 270 by himself, doubt obstruction     DM type 2 - controlled                                                                                                                                                                                                                                                                                                       FULL CODE status   from melissa acute 65 y/o man with PMH of Parkinson's disease (w/ mild dementia), DM2, and HTN who was BIBEMS from [facility] for multiple falls on the day of presentation, associated with a subacute decline in mental and functional status per nursing home report.    # s/p multiple falls / hypertensive orthostatic hypotension  - continue compression stockings and abdominal binder   -f/u PT  -d/c amlodipine    # Parkinson's Disease with altered mental status (psychosis)  - sinemet,  pimavanserin increased to 34 qhs, trial of pyridostigmine cr 180qhs, increase carbidopa 200mg AM and 4pm  - avoid haldol and ativan  - Neuro f/u   -seroquel 25mg hs prn nighttime agitation/sundowning    #s/p IV hydration with resolution of LUBNA and rhabdomyolysis, Now LUBNA again unknown etiology   -on IVF  -urine studies ordered  -s/p  400cc after bladder scan and 270 by himself, doubt obstruction     DM type 2 - controlled                                                                                                                                                                                                                                                                                                       FULL CODE status   from melissa acute

## 2020-05-29 NOTE — PROGRESS NOTE ADULT - SUBJECTIVE AND OBJECTIVE BOX
MAYRA GARDNER  64y  Male      Patient is a 64y old  Male who presents with a chief complaint of Fall (29 May 2020 13:01)      INTERVAL HPI/OVERNIGHT EVENTS:  no acute events overnight, pt is eating better today, more oriented, no acute complains    Vital Signs Last 24 Hrs  T(C): 35.6 (29 May 2020 13:28), Max: 36.2 (28 May 2020 20:48)  T(F): 96.1 (29 May 2020 13:28), Max: 97.1 (28 May 2020 20:48)  HR: 78 (29 May 2020 13:28) (72 - 78)  BP: 124/77 (29 May 2020 13:28) (118/61 - 133/84)  RR: 18 (29 May 2020 13:28) (18 - 18)    PHYSICAL EXAM:  General: No acute distress. abdominal binder in bed, femoral compression stocking  HEENT: Pupils, reactive to light   PULM: Clear to auscultation bilaterally, no significant sputum production.  CVS: Regular rate and rhythm, no murmurs, rubs, or gallops.  GI: Soft, nondistended, nontender, no masses.  MSK: No edema, nontender. Silver stockings on LE, Cogwheel rigidity   SKIN: Warm and well perfused, no rashes noted.  PSYCH: AAO1-2, confused   NEURO: Nn  focal         LABS:                        12.3   6.23  )-----------( 198      ( 29 May 2020 06:40 )             34.4     05-29    136  |  102  |  29<H>  ----------------------------<  94  4.4   |  23  |  1.5    Ca    9.0      29 May 2020 06:40  Mg     1.9     05-29    TPro  6.2  /  Alb  3.6  /  TBili  0.6  /  DBili  x   /  AST  16  /  ALT  8   /  AlkPhos  106  05-29          CAPILLARY BLOOD GLUCOSE      POCT Blood Glucose.: 111 mg/dL (29 May 2020 11:12)  POCT Blood Glucose.: 108 mg/dL (29 May 2020 08:12)      RADIOLOGY & ADDITIONAL TESTS:    Imaging Personally Reviewed:  [ ] YES  [ ] NO

## 2020-05-29 NOTE — PROGRESS NOTE ADULT - ASSESSMENT
65 y/o man with PMH of Parkinson's disease (w/ dementia), DM2, and HTN who was BIBEMS from [facility] for multiple falls on the day of presentation, associated with a subacute decline in mental and functional status per Asst Arely report.    # s/p multiple falls likely due to severe orthostatic hypotension and PD (Shy-Drager syndrome)  Bun and Cr were rising again -> cont IVFs LR 70/hr  d/c HS dose of droxidopa, and cont 200mg at 8am and 200mg at 4pm  cont Mestinon ER 180mg po HS  cont midodrine 5mg po q6am and 12pm: only give if sys BP < 150, otherwise hold dose; if giving at 150 cause HTN, will lower that parameter to 140  check orthostatics daily in AM -> I am more concerned w/ clinical response than the actual numbers of the BP  fall precautions  f/u physical therapy  continue compression stockings and abdominal binder  d/c norvasc    # LUBNA - ? pre-renal from dehydration from not drinking enough; r/o obstruction; poss ATN from hypoTN episodes; other?  Cr was 2 at start of admit, then became nl, now going back up again  place miller to r/o obstruction -> leave in place if >300cc urine found  send urine: U/A; UCr; Quinn and calc FeNa  might need renal eval if Cr >2    # Parkinson's Disease with dementia (prob Lewy-Body)   cont sinemet 25/100 po q6  d/w Neuro: c/w pimavanserin to 34mg po q24  seroquel 25mg hs prn nighttime agitation/sundowning   avoid haldol and ativan    # hypomagnesemia - better  cont w/ oral repletion (q24)    # DM type 2 - controlled off meds  can d/c insulin  can d/c FS  cont diabetic diet    # d/c asa 81mg q24 - see no reason for it    # DLD on statin    # glaucoma: cont gtts    # normo anemia (mild) of chr dz    # DVT ppx: LMWH q24    # GI ppx: on pepcid q24    # FULL CODE - Mandi will talk w/ her brother again; they are more inclined to go w/ DNR/I at this point -> they'll confirm that by tomorrow    # spoke w/ dtr, Mandi, about dx and plan; she is agreeable to long-term placement in SNF upon D/C;    Dispo: f/u orthostatic BP; f/u LUBNA; give IVFs; send urine studies; str cath to see if miller needed for obstruction  eventually will need placement in NH (dementia unit) -> f/u CM (poss start as STR, then convert to LT? - PT eval)

## 2020-05-30 ENCOUNTER — TRANSCRIPTION ENCOUNTER (OUTPATIENT)
Age: 65
End: 2020-05-30

## 2020-05-30 VITALS
RESPIRATION RATE: 18 BRPM | SYSTOLIC BLOOD PRESSURE: 168 MMHG | HEART RATE: 81 BPM | TEMPERATURE: 96 F | DIASTOLIC BLOOD PRESSURE: 82 MMHG

## 2020-05-30 LAB
ALBUMIN SERPL ELPH-MCNC: 3.4 G/DL — LOW (ref 3.5–5.2)
ALP SERPL-CCNC: 102 U/L — SIGNIFICANT CHANGE UP (ref 30–115)
ALT FLD-CCNC: <5 U/L — SIGNIFICANT CHANGE UP (ref 0–41)
ANION GAP SERPL CALC-SCNC: 11 MMOL/L — SIGNIFICANT CHANGE UP (ref 7–14)
AST SERPL-CCNC: 14 U/L — SIGNIFICANT CHANGE UP (ref 0–41)
BASOPHILS # BLD AUTO: 0.03 K/UL — SIGNIFICANT CHANGE UP (ref 0–0.2)
BASOPHILS NFR BLD AUTO: 0.6 % — SIGNIFICANT CHANGE UP (ref 0–1)
BILIRUB SERPL-MCNC: 0.6 MG/DL — SIGNIFICANT CHANGE UP (ref 0.2–1.2)
BUN SERPL-MCNC: 33 MG/DL — HIGH (ref 10–20)
CALCIUM SERPL-MCNC: 8.7 MG/DL — SIGNIFICANT CHANGE UP (ref 8.5–10.1)
CHLORIDE SERPL-SCNC: 104 MMOL/L — SIGNIFICANT CHANGE UP (ref 98–110)
CO2 SERPL-SCNC: 23 MMOL/L — SIGNIFICANT CHANGE UP (ref 17–32)
CREAT SERPL-MCNC: 1.2 MG/DL — SIGNIFICANT CHANGE UP (ref 0.7–1.5)
EOSINOPHIL # BLD AUTO: 0.19 K/UL — SIGNIFICANT CHANGE UP (ref 0–0.7)
EOSINOPHIL NFR BLD AUTO: 3.5 % — SIGNIFICANT CHANGE UP (ref 0–8)
GLUCOSE BLDC GLUCOMTR-MCNC: 103 MG/DL — HIGH (ref 70–99)
GLUCOSE BLDC GLUCOMTR-MCNC: 135 MG/DL — HIGH (ref 70–99)
GLUCOSE SERPL-MCNC: 109 MG/DL — HIGH (ref 70–99)
HCT VFR BLD CALC: 35.2 % — LOW (ref 42–52)
HGB BLD-MCNC: 12 G/DL — LOW (ref 14–18)
IMM GRANULOCYTES NFR BLD AUTO: 0.2 % — SIGNIFICANT CHANGE UP (ref 0.1–0.3)
LYMPHOCYTES # BLD AUTO: 1.13 K/UL — LOW (ref 1.2–3.4)
LYMPHOCYTES # BLD AUTO: 20.7 % — SIGNIFICANT CHANGE UP (ref 20.5–51.1)
MAGNESIUM SERPL-MCNC: 1.8 MG/DL — SIGNIFICANT CHANGE UP (ref 1.8–2.4)
MCHC RBC-ENTMCNC: 31 PG — SIGNIFICANT CHANGE UP (ref 27–31)
MCHC RBC-ENTMCNC: 34.1 G/DL — SIGNIFICANT CHANGE UP (ref 32–37)
MCV RBC AUTO: 91 FL — SIGNIFICANT CHANGE UP (ref 80–94)
MONOCYTES # BLD AUTO: 0.78 K/UL — HIGH (ref 0.1–0.6)
MONOCYTES NFR BLD AUTO: 14.3 % — HIGH (ref 1.7–9.3)
NEUTROPHILS # BLD AUTO: 3.31 K/UL — SIGNIFICANT CHANGE UP (ref 1.4–6.5)
NEUTROPHILS NFR BLD AUTO: 60.7 % — SIGNIFICANT CHANGE UP (ref 42.2–75.2)
NRBC # BLD: 0 /100 WBCS — SIGNIFICANT CHANGE UP (ref 0–0)
PLATELET # BLD AUTO: 207 K/UL — SIGNIFICANT CHANGE UP (ref 130–400)
POTASSIUM SERPL-MCNC: 4.4 MMOL/L — SIGNIFICANT CHANGE UP (ref 3.5–5)
POTASSIUM SERPL-SCNC: 4.4 MMOL/L — SIGNIFICANT CHANGE UP (ref 3.5–5)
PROT SERPL-MCNC: 6 G/DL — SIGNIFICANT CHANGE UP (ref 6–8)
RBC # BLD: 3.87 M/UL — LOW (ref 4.7–6.1)
RBC # FLD: 13 % — SIGNIFICANT CHANGE UP (ref 11.5–14.5)
SODIUM SERPL-SCNC: 138 MMOL/L — SIGNIFICANT CHANGE UP (ref 135–146)
WBC # BLD: 5.45 K/UL — SIGNIFICANT CHANGE UP (ref 4.8–10.8)
WBC # FLD AUTO: 5.45 K/UL — SIGNIFICANT CHANGE UP (ref 4.8–10.8)

## 2020-05-30 PROCEDURE — 99239 HOSP IP/OBS DSCHRG MGMT >30: CPT

## 2020-05-30 RX ORDER — PIMAVANSERIN TARTRATE 10 MG/1
1 TABLET, COATED ORAL
Qty: 0 | Refills: 0 | DISCHARGE
Start: 2020-05-30

## 2020-05-30 RX ORDER — FLUDROCORTISONE ACETATE 0.1 MG/1
1 TABLET ORAL
Qty: 0 | Refills: 0 | DISCHARGE

## 2020-05-30 RX ORDER — CARBIDOPA AND LEVODOPA 25; 100 MG/1; MG/1
1 TABLET ORAL
Qty: 0 | Refills: 0 | DISCHARGE
Start: 2020-05-30

## 2020-05-30 RX ORDER — LUTEIN 20 MG
1 CAPSULE ORAL
Qty: 0 | Refills: 0 | DISCHARGE

## 2020-05-30 RX ORDER — CARBIDOPA AND LEVODOPA 25; 100 MG/1; MG/1
1 TABLET ORAL
Qty: 0 | Refills: 0 | DISCHARGE

## 2020-05-30 RX ORDER — DROXIDOPA 100 MG/1
2 CAPSULE ORAL
Qty: 0 | Refills: 0 | DISCHARGE
Start: 2020-05-30

## 2020-05-30 RX ORDER — MULTIVIT-MIN/FERROUS GLUCONATE 9 MG/15 ML
0 LIQUID (ML) ORAL
Qty: 0 | Refills: 0 | DISCHARGE

## 2020-05-30 RX ORDER — METFORMIN HYDROCHLORIDE 850 MG/1
1 TABLET ORAL
Qty: 0 | Refills: 0 | DISCHARGE

## 2020-05-30 RX ORDER — MIDODRINE HYDROCHLORIDE 2.5 MG/1
1 TABLET ORAL
Qty: 0 | Refills: 0 | DISCHARGE
Start: 2020-05-30

## 2020-05-30 RX ADMIN — PIMAVANSERIN TARTRATE 34 MILLIGRAM(S): 10 TABLET, COATED ORAL at 11:58

## 2020-05-30 RX ADMIN — CARBIDOPA AND LEVODOPA 1 TABLET(S): 25; 100 TABLET ORAL at 05:07

## 2020-05-30 RX ADMIN — DROXIDOPA 200 MILLIGRAM(S): 100 CAPSULE ORAL at 08:43

## 2020-05-30 RX ADMIN — QUETIAPINE FUMARATE 25 MILLIGRAM(S): 200 TABLET, FILM COATED ORAL at 15:43

## 2020-05-30 RX ADMIN — Medication 1 APPLICATION(S): at 09:39

## 2020-05-30 RX ADMIN — MAGNESIUM OXIDE 400 MG ORAL TABLET 400 MILLIGRAM(S): 241.3 TABLET ORAL at 11:57

## 2020-05-30 RX ADMIN — ENOXAPARIN SODIUM 40 MILLIGRAM(S): 100 INJECTION SUBCUTANEOUS at 11:58

## 2020-05-30 RX ADMIN — MIDODRINE HYDROCHLORIDE 5 MILLIGRAM(S): 2.5 TABLET ORAL at 08:43

## 2020-05-30 RX ADMIN — CARBIDOPA AND LEVODOPA 1 TABLET(S): 25; 100 TABLET ORAL at 11:58

## 2020-05-30 RX ADMIN — FAMOTIDINE 20 MILLIGRAM(S): 10 INJECTION INTRAVENOUS at 11:58

## 2020-05-30 RX ADMIN — CARBIDOPA AND LEVODOPA 1 TABLET(S): 25; 100 TABLET ORAL at 00:53

## 2020-05-30 NOTE — CHART NOTE - NSCHARTNOTEFT_GEN_A_CORE
The patient was continuously ripping out iv fluids before he fell asleep last night around 8:30 pm. Throughout the night the patient attempted to remove the ivf intermittently upon awakening and needed to be reoriented most of the night in order to continue to have IVF running and in place with his iv.

## 2020-05-30 NOTE — DISCHARGE NOTE NURSING/CASE MANAGEMENT/SOCIAL WORK - NSDCPETBCESMAN_GEN_ALL_CORE
If you are a smoker, it is important for your health to stop smoking. Please be aware that second hand smoke is also harmful.
No

## 2020-05-30 NOTE — PROGRESS NOTE ADULT - REASON FOR ADMISSION
Fall

## 2020-05-30 NOTE — PROGRESS NOTE ADULT - ASSESSMENT
63 y/o man with PMH of Parkinson's disease (w/ dementia), DM2, and HTN who was BIBEMS from [facility] for multiple falls on the day of presentation, associated with a subacute decline in mental and functional status per Asst Arely report.    # s/p multiple falls likely due to severe orthostatic hypotension and PD (Shy-Drager syndrome)  d/c HS dose of droxidopa, and cont 200mg at 8am and 200mg at 4pm  cont Mestinon ER 180mg po HS  cont midodrine 5mg po q6am and 12pm: only give if sys BP < 150, otherwise hold dose; if giving at 150 cause HTN, will lower that parameter to 140  check orthostatics daily in AM -> I am more concerned w/ clinical response than the actual numbers of the BP  fall precautions  f/u physical therapy  continue compression stockings and abdominal binder  d/c norvasc    # LUBNA - ? pre-renal from dehydration from not drinking enough; r/o obstruction; poss ATN from hypoTN episodes; other?  Cr was 2 at start of admit  has improved again - can d/c IVFs  told RN that pt needs reminding to drink (and same at NH)  pt voiding on his own  no need to send urine studies at this point    # Parkinson's Disease with dementia (prob Lewy-Body)   cont sinemet 25/100 po q6  d/w Neuro: c/w pimavanserin to 34mg po q24  seroquel 25mg hs prn nighttime agitation/sundowning   avoid haldol and ativan    # hypomagnesemia - better  cont w/ oral repletion (q24)    # DM type 2 - controlled off meds  can d/c insulin  can d/c FS  cont diabetic diet    # d/c asa 81mg q24 - see no reason for it    # DLD on statin    # glaucoma: cont gtts    # normo anemia (mild) of chr dz    # DVT ppx: LMWH q24    # GI ppx: on pepcid q24    # FULL CODE - Mandi is talk w/ her brother; they are more inclined to go w/ DNR/I at this point -> they'll confirm their final decision w/ NH personnel     # spoke w/ dtr, Mandi, about dx and plan; she is agreeable to long-term placement in SNF; she is agreeable to D/C;    Dispo: d/c IVFs; may send to NH today (dementia unit) -> f/u CM (poss start as STR, then convert to LT? - PT eval)    Long term prog is poor and family are aware.

## 2020-05-30 NOTE — PROGRESS NOTE ADULT - SUBJECTIVE AND OBJECTIVE BOX
MAYRA GARDNER  64y  Male  ***My note supersedes ALL resident notes that I sign.  My corrections for their notes are in my note.***    I can be reached directly on [employer]. My office number is [phone]. My personal cell number is [phone].    INTERVAL EVENTS: Here for f/u of arcelia's. Pt actually walked w/ walker and PT about 50', which was impressive. Pt is also eating and drinking well again.    T(F): 97.5 (05-30-20 @ 04:50), Max: 97.5 (05-30-20 @ 04:50)  HR: 80 (05-30-20 @ 04:50) (78 - 82)  BP: 145/78 (05-30-20 @ 04:50) (118/56 - 145/78)  RR: 18 (05-30-20 @ 04:50) (18 - 18)  SpO2: 94% (05-30-20 @ 09:38) (94% - 94%)    Gen: NAD; at baseline confusion; can follow commands; looks great today  HEENT: PERRL, EOMI, mouth clr, nose clr  Neck: no nodes, no JVD, thyroid nl  lungs: clr  hrt: s1 s2 rrr + 2/6 sys murmur c/w AS  abd: soft, NT/ND, no HS megaly  ext: no edema, no c/c  neuro: aa ox1-2    LABS:                        12.0    (    91.0   5.45  )-----------( ---------      207      ( 30 May 2020 06:04 )             35.2    (    13.0     138   (   104   (   109      05-30-20 @ 06:04  ----------------------               4.4   (   23   (   33                             -----                        1.2  Ca  8.7   Mg  1.8    P   --     LFT  6.0  (  0.6  (  14       05-30-20 @ 06:04  -------------------------  3.4  (  102  (  <5    CAPILLARY BLOOD GLUCOSE  POCT Blood Glucose.: 135 (05-30-20 @ 11:07)  POCT Blood Glucose.: 103 (05-30-20 @ 07:34)  POCT Blood Glucose.: 134 (05-29-20 @ 21:32)  POCT Blood Glucose.: 117 (05-29-20 @ 20:59)  POCT Blood Glucose.: 131 (05-29-20 @ 16:19)  POCT Blood Glucose.: 111 (05-29-20 @ 11:12)  POCT Blood Glucose.: 108 (05-29-20 @ 08:12)  POCT Blood Glucose.: 116 (05-28-20 @ 10:32)    RADIOLOGY & ADDITIONAL TESTS:      MEDICATIONS:    acetaminophen   Tablet .. 650 milliGRAM(s) Oral every 6 hours PRN  atorvastatin 20 milliGRAM(s) Oral at bedtime  carbidopa/levodopa  25/100 1 Tablet(s) Oral every 6 hours  droxidopa 200 milliGRAM(s) Oral <User Schedule>  enoxaparin Injectable 40 milliGRAM(s) SubCutaneous daily  famotidine  Oral Tab/Cap - Peds 20 milliGRAM(s) Oral daily  lactated ringers. 1000 milliLiter(s) IV Continuous <Continuous>  latanoprost 0.005% Ophthalmic Solution 1 Drop(s) Both EYES at bedtime  magnesium oxide 400 milliGRAM(s) Oral daily  midodrine. 5 milliGRAM(s) Oral <User Schedule> PRN  pimavanserin Capsule 34 milliGRAM(s) Oral daily  pyridostigmine  milliGRAM(s) Oral at bedtime  QUEtiapine 25 milliGRAM(s) Oral at bedtime PRN  senna 2 Tablet(s) Oral at bedtime  timolol 0.5% Solution 1 Drop(s) Both EYES at bedtime

## 2020-06-03 DIAGNOSIS — E11.22 TYPE 2 DIABETES MELLITUS WITH DIABETIC CHRONIC KIDNEY DISEASE: ICD-10-CM

## 2020-06-03 DIAGNOSIS — N18.3 CHRONIC KIDNEY DISEASE, STAGE 3 (MODERATE): ICD-10-CM

## 2020-06-03 DIAGNOSIS — B35.1 TINEA UNGUIUM: ICD-10-CM

## 2020-06-03 DIAGNOSIS — I95.1 ORTHOSTATIC HYPOTENSION: ICD-10-CM

## 2020-06-03 DIAGNOSIS — D64.9 ANEMIA, UNSPECIFIED: ICD-10-CM

## 2020-06-03 DIAGNOSIS — E83.42 HYPOMAGNESEMIA: ICD-10-CM

## 2020-06-03 DIAGNOSIS — G20 PARKINSON'S DISEASE: ICD-10-CM

## 2020-06-03 DIAGNOSIS — I50.30 UNSPECIFIED DIASTOLIC (CONGESTIVE) HEART FAILURE: ICD-10-CM

## 2020-06-03 DIAGNOSIS — R07.9 CHEST PAIN, UNSPECIFIED: ICD-10-CM

## 2020-06-03 DIAGNOSIS — H40.9 UNSPECIFIED GLAUCOMA: ICD-10-CM

## 2020-06-03 DIAGNOSIS — Z79.82 LONG TERM (CURRENT) USE OF ASPIRIN: ICD-10-CM

## 2020-06-03 DIAGNOSIS — E87.2 ACIDOSIS: ICD-10-CM

## 2020-06-03 DIAGNOSIS — E78.5 HYPERLIPIDEMIA, UNSPECIFIED: ICD-10-CM

## 2020-06-03 DIAGNOSIS — F05 DELIRIUM DUE TO KNOWN PHYSIOLOGICAL CONDITION: ICD-10-CM
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
## 2020-06-03 DIAGNOSIS — T38.3X5A ADVERSE EFFECT OF INSULIN AND ORAL HYPOGLYCEMIC [ANTIDIABETIC] DRUGS, INITIAL ENCOUNTER: ICD-10-CM

## 2020-06-03 DIAGNOSIS — R44.3 HALLUCINATIONS, UNSPECIFIED: ICD-10-CM

## 2020-06-03 DIAGNOSIS — I31.3 PERICARDIAL EFFUSION (NONINFLAMMATORY): ICD-10-CM

## 2020-06-03 DIAGNOSIS — G90.3 MULTI-SYSTEM DEGENERATION OF THE AUTONOMIC NERVOUS SYSTEM: ICD-10-CM
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
## 2020-06-03 DIAGNOSIS — N17.9 ACUTE KIDNEY FAILURE, UNSPECIFIED: ICD-10-CM

## 2020-06-03 DIAGNOSIS — Z11.59 ENCOUNTER FOR SCREENING FOR OTHER VIRAL DISEASES: ICD-10-CM

## 2020-06-03 DIAGNOSIS — Z79.84 LONG TERM (CURRENT) USE OF ORAL HYPOGLYCEMIC DRUGS: ICD-10-CM

## 2020-06-03 DIAGNOSIS — T50.905A ADVERSE EFFECT OF UNSPECIFIED DRUGS, MEDICAMENTS AND BIOLOGICAL SUBSTANCES, INITIAL ENCOUNTER: ICD-10-CM

## 2020-06-03 DIAGNOSIS — F02.81 DEMENTIA IN OTHER DISEASES CLASSIFIED ELSEWHERE, UNSPECIFIED SEVERITY, WITH BEHAVIORAL DISTURBANCE: ICD-10-CM

## 2020-06-03 DIAGNOSIS — M62.82 RHABDOMYOLYSIS: ICD-10-CM

## 2020-06-03 DIAGNOSIS — E86.0 DEHYDRATION: ICD-10-CM

## 2020-06-03 DIAGNOSIS — S22.31XA FRACTURE OF ONE RIB, RIGHT SIDE, INITIAL ENCOUNTER FOR CLOSED FRACTURE: ICD-10-CM

## 2020-06-03 DIAGNOSIS — I12.9 HYPERTENSIVE CHRONIC KIDNEY DISEASE WITH STAGE 1 THROUGH STAGE 4 CHRONIC KIDNEY DISEASE, OR UNSPECIFIED CHRONIC KIDNEY DISEASE: ICD-10-CM

## 2020-06-03 DIAGNOSIS — R29.6 REPEATED FALLS: ICD-10-CM

## 2020-06-03 DIAGNOSIS — G90.8 OTHER DISORDERS OF AUTONOMIC NERVOUS SYSTEM: ICD-10-CM

## 2020-06-05 PROBLEM — Z00.00 ENCOUNTER FOR PREVENTIVE HEALTH EXAMINATION: Status: ACTIVE | Noted: 2020-06-05

## 2020-06-25 ENCOUNTER — APPOINTMENT (OUTPATIENT)
Dept: NEUROLOGY | Facility: CLINIC | Age: 65
End: 2020-06-25

## 2020-08-16 ENCOUNTER — INPATIENT (INPATIENT)
Facility: HOSPITAL | Age: 65
LOS: 2 days | Discharge: NURSING, REHAB OR LONG-TERM CARE | End: 2020-08-19
Attending: INTERNAL MEDICINE | Admitting: INTERNAL MEDICINE
Payer: MEDICARE

## 2020-08-16 VITALS
SYSTOLIC BLOOD PRESSURE: 120 MMHG | HEART RATE: 80 BPM | RESPIRATION RATE: 16 BRPM | TEMPERATURE: 99 F | OXYGEN SATURATION: 98 % | DIASTOLIC BLOOD PRESSURE: 75 MMHG

## 2020-08-16 LAB
ALBUMIN SERPL ELPH-MCNC: 3.7 G/DL — SIGNIFICANT CHANGE UP (ref 3.5–5.2)
ALBUMIN SERPL ELPH-MCNC: 3.8 G/DL — SIGNIFICANT CHANGE UP (ref 3.5–5.2)
ALP SERPL-CCNC: 164 U/L — HIGH (ref 30–115)
ALP SERPL-CCNC: 170 U/L — HIGH (ref 30–115)
ALT FLD-CCNC: 11 U/L — SIGNIFICANT CHANGE UP (ref 0–41)
ALT FLD-CCNC: 15 U/L — SIGNIFICANT CHANGE UP (ref 0–41)
ANION GAP SERPL CALC-SCNC: 11 MMOL/L — SIGNIFICANT CHANGE UP (ref 7–14)
ANION GAP SERPL CALC-SCNC: 13 MMOL/L — SIGNIFICANT CHANGE UP (ref 7–14)
APPEARANCE UR: CLEAR — SIGNIFICANT CHANGE UP
APTT BLD: 27.5 SEC — SIGNIFICANT CHANGE UP (ref 27–39.2)
AST SERPL-CCNC: 44 U/L — HIGH (ref 0–41)
AST SERPL-CCNC: 51 U/L — HIGH (ref 0–41)
BACTERIA # UR AUTO: NEGATIVE — SIGNIFICANT CHANGE UP
BASOPHILS # BLD AUTO: 0.03 K/UL — SIGNIFICANT CHANGE UP (ref 0–0.2)
BASOPHILS NFR BLD AUTO: 0.5 % — SIGNIFICANT CHANGE UP (ref 0–1)
BILIRUB DIRECT SERPL-MCNC: <0.2 MG/DL — SIGNIFICANT CHANGE UP (ref 0–0.2)
BILIRUB INDIRECT FLD-MCNC: >0.5 MG/DL — SIGNIFICANT CHANGE UP (ref 0.2–1.2)
BILIRUB SERPL-MCNC: 0.5 MG/DL — SIGNIFICANT CHANGE UP (ref 0.2–1.2)
BILIRUB SERPL-MCNC: 0.7 MG/DL — SIGNIFICANT CHANGE UP (ref 0.2–1.2)
BILIRUB UR-MCNC: NEGATIVE — SIGNIFICANT CHANGE UP
BLD GP AB SCN SERPL QL: SIGNIFICANT CHANGE UP
BUN SERPL-MCNC: 16 MG/DL — SIGNIFICANT CHANGE UP (ref 10–20)
BUN SERPL-MCNC: 17 MG/DL — SIGNIFICANT CHANGE UP (ref 10–20)
CALCIUM SERPL-MCNC: 8.8 MG/DL — SIGNIFICANT CHANGE UP (ref 8.5–10.1)
CALCIUM SERPL-MCNC: 9 MG/DL — SIGNIFICANT CHANGE UP (ref 8.5–10.1)
CHLORIDE SERPL-SCNC: 104 MMOL/L — SIGNIFICANT CHANGE UP (ref 98–110)
CHLORIDE SERPL-SCNC: 107 MMOL/L — SIGNIFICANT CHANGE UP (ref 98–110)
CK SERPL-CCNC: 1905 U/L — HIGH (ref 0–225)
CO2 SERPL-SCNC: 24 MMOL/L — SIGNIFICANT CHANGE UP (ref 17–32)
CO2 SERPL-SCNC: 25 MMOL/L — SIGNIFICANT CHANGE UP (ref 17–32)
COLOR SPEC: YELLOW — SIGNIFICANT CHANGE UP
CREAT SERPL-MCNC: 0.8 MG/DL — SIGNIFICANT CHANGE UP (ref 0.7–1.5)
CREAT SERPL-MCNC: 0.8 MG/DL — SIGNIFICANT CHANGE UP (ref 0.7–1.5)
DIFF PNL FLD: NEGATIVE — SIGNIFICANT CHANGE UP
EOSINOPHIL # BLD AUTO: 0.02 K/UL — SIGNIFICANT CHANGE UP (ref 0–0.7)
EOSINOPHIL NFR BLD AUTO: 0.3 % — SIGNIFICANT CHANGE UP (ref 0–8)
EPI CELLS # UR: 2 /HPF — SIGNIFICANT CHANGE UP (ref 0–5)
ETHANOL SERPL-MCNC: <10 MG/DL — SIGNIFICANT CHANGE UP
GIANT PLATELETS BLD QL SMEAR: PRESENT — SIGNIFICANT CHANGE UP
GLUCOSE BLDC GLUCOMTR-MCNC: 99 MG/DL — SIGNIFICANT CHANGE UP (ref 70–99)
GLUCOSE SERPL-MCNC: 87 MG/DL — SIGNIFICANT CHANGE UP (ref 70–99)
GLUCOSE SERPL-MCNC: 97 MG/DL — SIGNIFICANT CHANGE UP (ref 70–99)
GLUCOSE UR QL: NEGATIVE — SIGNIFICANT CHANGE UP
HCT VFR BLD CALC: 34.4 % — LOW (ref 42–52)
HGB BLD-MCNC: 11.8 G/DL — LOW (ref 14–18)
HYALINE CASTS # UR AUTO: 10 /LPF — HIGH (ref 0–7)
IMM GRANULOCYTES NFR BLD AUTO: 0.3 % — SIGNIFICANT CHANGE UP (ref 0.1–0.3)
INR BLD: 1.3 RATIO — SIGNIFICANT CHANGE UP (ref 0.65–1.3)
KETONES UR-MCNC: ABNORMAL
LACTATE SERPL-SCNC: 1.1 MMOL/L — SIGNIFICANT CHANGE UP (ref 0.7–2)
LEUKOCYTE ESTERASE UR-ACNC: NEGATIVE — SIGNIFICANT CHANGE UP
LIDOCAIN IGE QN: 10 U/L — SIGNIFICANT CHANGE UP (ref 7–60)
LYMPHOCYTES # BLD AUTO: 0.94 K/UL — LOW (ref 1.2–3.4)
LYMPHOCYTES # BLD AUTO: 15.1 % — LOW (ref 20.5–51.1)
MAGNESIUM SERPL-MCNC: 1.9 MG/DL — SIGNIFICANT CHANGE UP (ref 1.8–2.4)
MCHC RBC-ENTMCNC: 29.7 PG — SIGNIFICANT CHANGE UP (ref 27–31)
MCHC RBC-ENTMCNC: 34.3 G/DL — SIGNIFICANT CHANGE UP (ref 32–37)
MCV RBC AUTO: 86.6 FL — SIGNIFICANT CHANGE UP (ref 80–94)
MONOCYTES # BLD AUTO: 0.64 K/UL — HIGH (ref 0.1–0.6)
MONOCYTES NFR BLD AUTO: 10.3 % — HIGH (ref 1.7–9.3)
NEUTROPHILS # BLD AUTO: 4.57 K/UL — SIGNIFICANT CHANGE UP (ref 1.4–6.5)
NEUTROPHILS NFR BLD AUTO: 73.5 % — SIGNIFICANT CHANGE UP (ref 42.2–75.2)
NITRITE UR-MCNC: NEGATIVE — SIGNIFICANT CHANGE UP
NRBC # BLD: 0 /100 WBCS — SIGNIFICANT CHANGE UP (ref 0–0)
PH UR: 6.5 — SIGNIFICANT CHANGE UP (ref 5–8)
PLAT MORPH BLD: NORMAL — SIGNIFICANT CHANGE UP
PLATELET # BLD AUTO: 221 K/UL — SIGNIFICANT CHANGE UP (ref 130–400)
POTASSIUM SERPL-MCNC: 3.4 MMOL/L — LOW (ref 3.5–5)
POTASSIUM SERPL-MCNC: 3.5 MMOL/L — SIGNIFICANT CHANGE UP (ref 3.5–5)
POTASSIUM SERPL-SCNC: 3.4 MMOL/L — LOW (ref 3.5–5)
POTASSIUM SERPL-SCNC: 3.5 MMOL/L — SIGNIFICANT CHANGE UP (ref 3.5–5)
PROT SERPL-MCNC: 6.3 G/DL — SIGNIFICANT CHANGE UP (ref 6–8)
PROT SERPL-MCNC: 6.3 G/DL — SIGNIFICANT CHANGE UP (ref 6–8)
PROT UR-MCNC: ABNORMAL
PROTHROM AB SERPL-ACNC: 14.9 SEC — HIGH (ref 9.95–12.87)
RBC # BLD: 3.97 M/UL — LOW (ref 4.7–6.1)
RBC # FLD: 12.3 % — SIGNIFICANT CHANGE UP (ref 11.5–14.5)
RBC BLD AUTO: NORMAL — SIGNIFICANT CHANGE UP
RBC CASTS # UR COMP ASSIST: 16 /HPF — HIGH (ref 0–4)
SMUDGE CELLS # BLD: PRESENT — SIGNIFICANT CHANGE UP
SODIUM SERPL-SCNC: 141 MMOL/L — SIGNIFICANT CHANGE UP (ref 135–146)
SODIUM SERPL-SCNC: 143 MMOL/L — SIGNIFICANT CHANGE UP (ref 135–146)
SP GR SPEC: >1.05 (ref 1.01–1.02)
TROPONIN T SERPL-MCNC: 0.03 NG/ML — CRITICAL HIGH
TROPONIN T SERPL-MCNC: 0.04 NG/ML — CRITICAL HIGH
UROBILINOGEN FLD QL: ABNORMAL
VARIANT LYMPHS # BLD: 0.9 % — SIGNIFICANT CHANGE UP (ref 0–5)
WBC # BLD: 6.2 K/UL — SIGNIFICANT CHANGE UP (ref 4.8–10.8)
WBC # FLD AUTO: 6.2 K/UL — SIGNIFICANT CHANGE UP (ref 4.8–10.8)
WBC UR QL: 3 /HPF — SIGNIFICANT CHANGE UP (ref 0–5)

## 2020-08-16 PROCEDURE — 93010 ELECTROCARDIOGRAM REPORT: CPT

## 2020-08-16 PROCEDURE — 73100 X-RAY EXAM OF WRIST: CPT | Mod: 26,RT

## 2020-08-16 PROCEDURE — 71045 X-RAY EXAM CHEST 1 VIEW: CPT | Mod: 26

## 2020-08-16 PROCEDURE — 71260 CT THORAX DX C+: CPT | Mod: 26

## 2020-08-16 PROCEDURE — 70450 CT HEAD/BRAIN W/O DYE: CPT | Mod: 26

## 2020-08-16 PROCEDURE — 99285 EMERGENCY DEPT VISIT HI MDM: CPT | Mod: CS,GC

## 2020-08-16 PROCEDURE — 72125 CT NECK SPINE W/O DYE: CPT | Mod: 26

## 2020-08-16 PROCEDURE — 72170 X-RAY EXAM OF PELVIS: CPT | Mod: 26,59

## 2020-08-16 PROCEDURE — 73090 X-RAY EXAM OF FOREARM: CPT | Mod: 26,RT

## 2020-08-16 PROCEDURE — 73562 X-RAY EXAM OF KNEE 3: CPT | Mod: 26,RT

## 2020-08-16 PROCEDURE — 73070 X-RAY EXAM OF ELBOW: CPT | Mod: 26,RT

## 2020-08-16 PROCEDURE — 73552 X-RAY EXAM OF FEMUR 2/>: CPT | Mod: 26,RT

## 2020-08-16 PROCEDURE — 74177 CT ABD & PELVIS W/CONTRAST: CPT | Mod: 26

## 2020-08-16 PROCEDURE — 72190 X-RAY EXAM OF PELVIS: CPT | Mod: 26

## 2020-08-16 RX ORDER — LACTULOSE 10 G/15ML
20 SOLUTION ORAL DAILY
Refills: 0 | Status: DISCONTINUED | OUTPATIENT
Start: 2020-08-16 | End: 2020-08-19

## 2020-08-16 RX ORDER — ATORVASTATIN CALCIUM 80 MG/1
1 TABLET, FILM COATED ORAL
Qty: 0 | Refills: 0 | DISCHARGE

## 2020-08-16 RX ORDER — LACTULOSE 10 G/15ML
30 SOLUTION ORAL
Qty: 0 | Refills: 0 | DISCHARGE

## 2020-08-16 RX ORDER — SODIUM CHLORIDE 9 MG/ML
1000 INJECTION INTRAMUSCULAR; INTRAVENOUS; SUBCUTANEOUS
Refills: 0 | Status: DISCONTINUED | OUTPATIENT
Start: 2020-08-16 | End: 2020-08-17

## 2020-08-16 RX ORDER — SENNA PLUS 8.6 MG/1
2 TABLET ORAL AT BEDTIME
Refills: 0 | Status: DISCONTINUED | OUTPATIENT
Start: 2020-08-16 | End: 2020-08-19

## 2020-08-16 RX ORDER — POTASSIUM CHLORIDE 20 MEQ
20 PACKET (EA) ORAL ONCE
Refills: 0 | Status: COMPLETED | OUTPATIENT
Start: 2020-08-16 | End: 2020-08-16

## 2020-08-16 RX ORDER — CARBIDOPA AND LEVODOPA 25; 100 MG/1; MG/1
1 TABLET ORAL EVERY 6 HOURS
Refills: 0 | Status: DISCONTINUED | OUTPATIENT
Start: 2020-08-16 | End: 2020-08-19

## 2020-08-16 RX ORDER — OLANZAPINE 15 MG/1
1 TABLET, FILM COATED ORAL
Qty: 0 | Refills: 0 | DISCHARGE

## 2020-08-16 RX ORDER — ASPIRIN/CALCIUM CARB/MAGNESIUM 324 MG
81 TABLET ORAL DAILY
Refills: 0 | Status: DISCONTINUED | OUTPATIENT
Start: 2020-08-16 | End: 2020-08-19

## 2020-08-16 RX ORDER — ASPIRIN/CALCIUM CARB/MAGNESIUM 324 MG
1 TABLET ORAL
Qty: 0 | Refills: 0 | DISCHARGE

## 2020-08-16 RX ORDER — TIMOLOL-LATANOPROST PF 5; .05 MG/ML; MG/ML
0 SOLUTION/ DROPS OPHTHALMIC
Qty: 0 | Refills: 0 | DISCHARGE

## 2020-08-16 RX ORDER — ATORVASTATIN CALCIUM 80 MG/1
20 TABLET, FILM COATED ORAL AT BEDTIME
Refills: 0 | Status: DISCONTINUED | OUTPATIENT
Start: 2020-08-16 | End: 2020-08-19

## 2020-08-16 RX ORDER — MAGNESIUM OXIDE 400 MG ORAL TABLET 241.3 MG
400 TABLET ORAL DAILY
Refills: 0 | Status: DISCONTINUED | OUTPATIENT
Start: 2020-08-16 | End: 2020-08-19

## 2020-08-16 RX ORDER — CHLORHEXIDINE GLUCONATE 213 G/1000ML
1 SOLUTION TOPICAL
Refills: 0 | Status: DISCONTINUED | OUTPATIENT
Start: 2020-08-16 | End: 2020-08-19

## 2020-08-16 RX ORDER — ENOXAPARIN SODIUM 100 MG/ML
40 INJECTION SUBCUTANEOUS DAILY
Refills: 0 | Status: DISCONTINUED | OUTPATIENT
Start: 2020-08-16 | End: 2020-08-19

## 2020-08-16 RX ORDER — PYRIDOSTIGMINE BROMIDE 60 MG/5ML
180 SOLUTION ORAL AT BEDTIME
Refills: 0 | Status: DISCONTINUED | OUTPATIENT
Start: 2020-08-16 | End: 2020-08-19

## 2020-08-16 RX ORDER — FAMOTIDINE 10 MG/ML
0 INJECTION INTRAVENOUS
Qty: 0 | Refills: 0 | DISCHARGE

## 2020-08-16 RX ORDER — DONEPEZIL HYDROCHLORIDE 10 MG/1
5 TABLET, FILM COATED ORAL DAILY
Refills: 0 | Status: DISCONTINUED | OUTPATIENT
Start: 2020-08-16 | End: 2020-08-19

## 2020-08-16 RX ORDER — OLANZAPINE 15 MG/1
10 TABLET, FILM COATED ORAL AT BEDTIME
Refills: 0 | Status: DISCONTINUED | OUTPATIENT
Start: 2020-08-16 | End: 2020-08-19

## 2020-08-16 RX ORDER — POTASSIUM CHLORIDE 20 MEQ
20 PACKET (EA) ORAL ONCE
Refills: 0 | Status: DISCONTINUED | OUTPATIENT
Start: 2020-08-16 | End: 2020-08-16

## 2020-08-16 RX ORDER — FAMOTIDINE 10 MG/ML
20 INJECTION INTRAVENOUS DAILY
Refills: 0 | Status: DISCONTINUED | OUTPATIENT
Start: 2020-08-16 | End: 2020-08-19

## 2020-08-16 RX ADMIN — FAMOTIDINE 20 MILLIGRAM(S): 10 INJECTION INTRAVENOUS at 18:23

## 2020-08-16 RX ADMIN — ENOXAPARIN SODIUM 40 MILLIGRAM(S): 100 INJECTION SUBCUTANEOUS at 15:27

## 2020-08-16 RX ADMIN — DONEPEZIL HYDROCHLORIDE 5 MILLIGRAM(S): 10 TABLET, FILM COATED ORAL at 18:23

## 2020-08-16 RX ADMIN — SODIUM CHLORIDE 75 MILLILITER(S): 9 INJECTION INTRAMUSCULAR; INTRAVENOUS; SUBCUTANEOUS at 15:27

## 2020-08-16 RX ADMIN — CARBIDOPA AND LEVODOPA 1 TABLET(S): 25; 100 TABLET ORAL at 18:24

## 2020-08-16 RX ADMIN — OLANZAPINE 10 MILLIGRAM(S): 15 TABLET, FILM COATED ORAL at 22:03

## 2020-08-16 RX ADMIN — Medication 81 MILLIGRAM(S): at 18:23

## 2020-08-16 RX ADMIN — MAGNESIUM OXIDE 400 MG ORAL TABLET 400 MILLIGRAM(S): 241.3 TABLET ORAL at 18:24

## 2020-08-16 RX ADMIN — PYRIDOSTIGMINE BROMIDE 180 MILLIGRAM(S): 60 SOLUTION ORAL at 22:03

## 2020-08-16 RX ADMIN — SENNA PLUS 2 TABLET(S): 8.6 TABLET ORAL at 22:03

## 2020-08-16 RX ADMIN — ATORVASTATIN CALCIUM 20 MILLIGRAM(S): 80 TABLET, FILM COATED ORAL at 22:03

## 2020-08-16 NOTE — H&P ADULT - HISTORY OF PRESENT ILLNESS
64y/o M from [facility] with PMH of Parkinson's dementia rapidly progressive, orthostatic  DM presents to ED after multiple falls. In last 2 weeks fell 7/30, 8/4, 8/5, 8/7 and again day of admit. Pt this last time was a little diaphoretic, but afebrile. Was sent in by [facility] for eval and COVID swab. Currently no complaints. 66y/o M from [facility] with PMH of Parkinson's dementia rapidly progressive, orthostatic  DM presents to ED after multiple falls. In last 2 weeks fell 7/30, 8/4, 8/5, 8/7 and again day of admit. Pt this last time was a little diaphoretic, but afebrile. Was sent in by [facility] for eval and COVID swab. Currently no complaints except generalized weakness and tiredness. He remembers the falls and said that even with walker/cane he is so unsteady that he cannot stand 64y/o M from [facility] with PMH of Parkinson's dementia rapidly progressive, orthostatic  DM presents to ED after multiple falls. In last 2 weeks fell 7/30, 8/4, 8/5, 8/7 and again day of admit. Pt this last time was a little diaphoretic, but afebrile. Was sent in by [facility] for eval and COVID swab. Currently no complaints except generalized weakness and tiredness. He remembers the falls and said that even with walker/cane he is so unsteady that he cannot stand and feels overall weak. But denies CP, palpitations, SOB, abd pain, N/V/D/C. Spoke with daughter who explained that he's had Parkinson's for over 5 years and has rapidly declined, was put in [facility] last Oct, and hasn't improved at all. He has become weaker, less stable, and lost QOL. Family already looking into Medicaid and long-term options.     In ED: T98.6, HR 84, /82, RR18, SpO2 98%.   Labs significant for mild , trop 0.04 > 0.03, CK 1905, K 3.2 s/p PO repletion. UA neg, EKG no ischemic changes.  Panscan showed R acetabular fx (has prior hip replacement) but as per ortho non-operative at this stage.

## 2020-08-16 NOTE — CONSULT NOTE ADULT - SUBJECTIVE AND OBJECTIVE BOX
Orthopaedics Consult Note    MAYRA GARDNER  [id number]        Patient is a 65y year old Male w/ hx of DM, dementia, parkinson disease, and recurrent falls presenting after being found down in nursing home for unk amount of time.  Imaging showed right acetabular fracture around KORY component.  Patient is minimally responsive to commands/questions due to dementia.  Patient occasionally ambulates, as per nursing home, but is unsteady and has hx of recurrent falls.  Details regarding KORY unknown.    PMH/PSH  ELEVATED TROPONIN, FREQUENT FALLS  ^FALL  No pertinent family history in first degree relatives  MEWS Score  Mild dementia  Parkinson's disease  Diabetes 1.5, managed as type 2  Acetabular fracture  No significant past surgical history  FALL  77  Frequent falls  Hypokalemia  Elevated troponin  [email or web address]      Medications  chlorhexidine 4% Liquid 1 Application(s) Topical <User Schedule>  enoxaparin Injectable 40 milliGRAM(s) SubCutaneous daily  sodium chloride 0.9%. 1000 milliLiter(s) IV Continuous <Continuous>      Allergies  No Known Allergies        T(C): 37 (08-16-20 @ 08:03), Max: 37 (08-16-20 @ 01:17)  HR: 84 (08-16-20 @ 08:03) (80 - 84)  BP: 131/82 (08-16-20 @ 08:03) (120/75 - 131/82)  RR: 18 (08-16-20 @ 08:03) (16 - 18)  SpO2: 98% (08-16-20 @ 08:03) (98% - 98%)    Physical Exam  NAD  Breathing comfortably on RA  Resting comfortably    B/L UE  skin intact  NonTTP throughout  grossly moving fingers and responds to sensory stimuli, but unable to perform full assessment   fingers wwp      BL LE  R hip scar healed, skin intact  NonTTP throughout  grossly moving toes and responds to sensory stimuli, but unable to perform full assessment   toes wwp  Labs                        11.8   6.20  )-----------( 221      ( 16 Aug 2020 04:05 )             34.4     08-16    141  |  104  |  17  ----------------------------<  97  3.4<L>   |  24  |  0.8    Ca    9.0      16 Aug 2020 04:05    TPro  6.3  /  Alb  3.8  /  TBili  0.5  /  DBili  x   /  AST  44<H>  /  ALT  11  /  AlkPhos  170<H>  08-16    LIVER FUNCTIONS - ( 16 Aug 2020 04:05 )  Alb: 3.8 g/dL / Pro: 6.3 g/dL / ALK PHOS: 170 U/L / ALT: 11 U/L / AST: 44 U/L / GGT: x           PT/INR - ( 16 Aug 2020 04:05 )   PT: 14.90 sec;   INR: 1.30 ratio         PTT - ( 16 Aug 2020 04:05 )  PTT:27.5 sec    Img:  Right posterior/superior acetabular fx around KORY acetabular component      A/P: Patient is a 65y year old Male with right total hip arthroplasty periprosthetic acetabular fracture    -nonweightbearing to RLE  - patient admitted to medical service  -keep NPO for now  - pain control PRN  - DVT ppx  - further recs pending d/w ortho trauma attending  - care per primary team

## 2020-08-16 NOTE — H&P ADULT - ASSESSMENT
64y/o M with progressive Parkisnons dementia and multiple falls and decreasing QOL.     #Frequent falls likely due to Parkinson's dementia and worsening deconditioning  - falls becoming more frequent as pt becomes weaker and Parkinson's progresses; likely not appropriate for [facility] anymore, will need long-term placement  - c/w carbidopa-levodopa, donepezil, pyridostigmine, olazapine  - Neuro eval (previously saw Dr. Josette Clark at [facility])    #R acetabular fx  - R acetabular fx currently no operative tx planned  - Ortho recs appreciated  - PT/rehab  - NWB R leg    #DM1.5 (?)- not on meds, monitor FS BID only  #Constipation- c/w senna, lactulose  #Suspected Mg def- c/w supplement    #MISC  - DVT ppx: lovenox  - GI ppx: pepcid  - Diet: DASH, diabetic  - Activity: NWB R leg, w/ assistance  - Code Status: DNR/DNI, no feeding tubes as per daughter Mandi 66y/o M with progressive Parkisnons dementia and multiple falls and decreasing QOL.     #Frequent falls likely due to Parkinson's dementia and worsening deconditioning  - falls becoming more frequent as pt becomes weaker and Parkinson's progresses; likely not appropriate for [facility] anymore, will need long-term placement  - c/w carbidopa-levodopa, donepezil, pyridostigmine, olazapine  - Neuro eval (previously saw Dr. Josette Clark at [facility])    #R acetabular fx  - R acetabular fx currently no operative tx planned  - Ortho recs appreciated  - PT/rehab  - NWB R leg    #Elevated CK, likely due to falls  - c/w NS @ 75, trend in AM     #Mild troponemia, asymptomatic- 0.04>0.03, no EKG changes or complaints, monitor, possibly orthostatic hypotensive?    #DM1.5 (?)- not on meds, monitor FS BID only  #Constipation- c/w senna, lactulose  #Suspected Mg def- c/w supplement    #MISC  - DVT ppx: lovenox  - GI ppx: pepcid  - Diet: DASH, diabetic  - Activity: NWB R leg, w/ assistance  - Code Status: DNR/DNI, no feeding tubes as per daughter Mandi

## 2020-08-16 NOTE — CONSULT NOTE ADULT - ATTENDING COMMENTS
Patient examined this afternoon and has orthostatic symptoms related to autonomic failure from parkinsons and parkinson dementia.  Options include adding low dose florinef 0.1 mg/day and keeping head of bed elevated to 45 degrees at night.  Then if able to reduce pyridostigmine may lower to 120 mg/day for 3 days then to 60 mg/day.  If orthostatic symptoms increase then may increase florinef to 0.2 mg/day.    For dementia can try to increase donepezil from 5 mg qam with breakfast to 10 mg qam with breakfast after the pyridostigmine has been lowered to 60 mg/day.

## 2020-08-16 NOTE — ED PROVIDER NOTE - ATTENDING CONTRIBUTION TO CARE
64yo M with PMHx Parkinson's disease, dementia, DM, HTN, BIBEMS from [facility] for multiple falls in the past 2 weeks. Per EMS had another fall today, was found on the ground at the facility. Denies LOC. Patient is relatively poor historian, does not recall details of fall. Unknown if mechanical fall vs syncope. Review of records shows pt admitted May 2020 for frequent falls, sustained 1 rib fx at that time.    Vital Signs: I have reviewed the initial vital signs.  Constitutional: WDWN in nad.  HEAD: No signs of basilar skull fracture.  Integumentary: Forehead and right forearm abrasion.  EYES: No periorbial swelling/ecchymoses. Pupils 2mm, equal, reactive. EOMI  ENT: MMM. No septal hematoma. No mastoid ecchymoses.  NECK: Supple, non-tender, no spinous tenderness to neck. No palpable shelves or step-offs.  BACK: No spinous tenderness. No palpable shelves or step-offs.  Cardiovascular: RRR, radial pulses 2/4 b/l. No pain to palpation to chest wall.  Respiratory: BS present b/l, ctabl, no wheezing or crackles, good air exchange, good resp effort and excursion, no accessory muscle use, no stridor.   Gastrointestinal: Soft, nd, nt no rebound tenderness or guarding  Musculoskeletal: Brisk cap refill. Equal radial pulses. Mild TTP right forearm, able to range RUE and LUE. Pt ranging B/L LE but states has B/L hip pain with ROM. No hip TTP, shortened or rotated LE. No midline back TTP.   Neurologic: AAOx2. GCS 15. Drowsy but easily arousable. Answers basic questions but easily confused. No tremor.

## 2020-08-16 NOTE — ED ADULT NURSE REASSESSMENT NOTE - NS ED NURSE REASSESS COMMENT FT1
assumed care of pt. he is resting in stretcher watching tv. respirations even and unlabored, pt deneis pain. Pt with urinal @ the bedside. bed alarm maintained for safety. Will continue to monitor pt.

## 2020-08-16 NOTE — CHART NOTE - NSCHARTNOTEFT_GEN_A_CORE
Pt is followed by our practice at [facility], although with the current   pandemic,  visits to the assisted living facility have been curtailed by state and management.  Received call from [facility], early AM about pt's falls and was being sent to [facility] ER for eval.  Eval in progress. If needs admission can admit to my service. Pt is followed by our practice at [facility], although with the current   pandemic,  visits to the assisted living facility have been curtailed by state and management.  Received call from [facility] nursing, early AM about pt's falls and was being sent to [facility] ER for eval.  Eval in progress. If needs admission can admit to my service.  --------------  Pt admitted  spoke to Mandi pt's daughter and briefly discussed  Will admit to my service and follow  neuro and PT/rehab eval will be needed. Pt is followed by our practice at [facility], although with the current   pandemic,  visits to the assisted living facility have been curtailed by state and management.  Received call from [facility] nursing, early AM about pt's falls and was being sent to [facility] ER for eval.  Eval in progress. If needs admission can admit to my service.  --------------  Pt admitted  spoke to Mandi pt's daughter and briefly discussed  Will admit to my service and follow  neuro , ortho and PT/rehab eval will be needed.

## 2020-08-16 NOTE — ED ADULT TRIAGE NOTE - CHIEF COMPLAINT QUOTE
Fall out of bed, found on floor, no LOC. Pt has left eye swelling, left arm pain and skin tear. Pt is at baseline MS

## 2020-08-16 NOTE — CONSULT NOTE ADULT - SUBJECTIVE AND OBJECTIVE BOX
Neurology Consult    Patient is a 65y old  Male who presents with a chief complaint of repeated falls (16 Aug 2020 15:15)      HPI:  64y/o M from [facility] with PMH of Parkinson's dementia rapidly progressive, orthostatic  DM presents to ED after multiple falls. In last 2 weeks fell 7/30, 8/4, 8/5, 8/7 and again day of admit. Pt this last time was a little diaphoretic, but afebrile. Was sent in by [place] for eval and COVID swab. Currently no complaints except generalized weakness and tiredness. He remembers the falls and said that even with walker/cane he is so unsteady that he cannot stand and feels overall weak. But denies CP, palpitations, SOB, abd pain, N/V/D/C. Spoke with daughter who explained that he's had Parkinson's for over 5 years and has rapidly declined, was put in [place] last Oct, and hasn't improved at all. He has become weaker, less stable, and lost QOL. Family already looking into Medicaid and long-term options. In ED: T98.6, HR 84, /82, RR18, SpO2 98%.   Labs significant for mild , trop 0.04 > 0.03, CK 1905, K 3.2 s/p PO repletion. UA neg, EKG no ischemic changes.  Panscan showed R acetabular fx (has prior hip replacement) but as per ortho non-operative at this stage. (16 Aug 2020 15:15)  Discussion with family  new code status - pt now deemed DNR/DNI No feeding tube with long term placement as Goals of care.    Neurology asked to evaluate and consult for his frequent falls in the settiing of his progressive Parkinson's dementia and worsening deconditioning..  - falls becoming more frequent as pt becomes weaker -pt will need long-term placement  - Re evaluate current medications carbidopa-levodopa, donepezil, pyridostigmine, olazapine  - Neuro eval (previously saw Dr. Josette Clark at [facility])          PAST MEDICAL & SURGICAL HISTORY:  Mild dementia  Parkinson's disease  Diabetes 1.5, managed as type 2  No significant past surgical history      FAMILY HISTORY:  No pertinent family history in first degree relatives      Social History: (-) x 3    Allergies    No Known Allergies    Intolerances        MEDICATIONS  (STANDING):  aspirin enteric coated 81 milliGRAM(s) Oral daily  atorvastatin 20 milliGRAM(s) Oral at bedtime  carbidopa/levodopa  25/100 1 Tablet(s) Oral every 6 hours  chlorhexidine 4% Liquid 1 Application(s) Topical <User Schedule>  donepezil 5 milliGRAM(s) Oral daily  enoxaparin Injectable 40 milliGRAM(s) SubCutaneous daily  famotidine    Tablet 20 milliGRAM(s) Oral daily  lactulose Syrup 20 Gram(s) Oral daily  magnesium oxide 400 milliGRAM(s) Oral daily  OLANZapine 10 milliGRAM(s) Oral at bedtime  pyridostigmine  milliGRAM(s) Oral at bedtime  senna 2 Tablet(s) Oral at bedtime  sodium chloride 0.9%. 1000 milliLiter(s) (75 mL/Hr) IV Continuous <Continuous>    MEDICATIONS  (PRN):      Review of systems:    Constitutional: No fever, weight loss or fatigue    Eyes: No eye pain or discharge  ENMT:  No difficulty hearing; No sinus or throat pain  Neck: No pain or stiffness  Respiratory: No cough, wheezing, chills or hemoptysis  Cardiovascular: No chest pain, palpitations, shortness of breath, dyspnea on exertion  Gastrointestinal: No abdominal pain, nausea, vomiting or hematemesis; No diarrhea or constipation.   Genitourinary: No dysuria, frequency, hematuria or incontinence  Neurological: As per HPI  Skin: No rashes or lesions   Endocrine: No heat or cold intolerance; No hair loss  Musculoskeletal: No joint pain or swelling  Psychiatric: No depression, anxiety, mood swings  Heme/Lymph: No easy bruising or bleeding gums    Vital Signs Last 24 Hrs  T(C): 36.6 (16 Aug 2020 16:37), Max: 37 (16 Aug 2020 01:17)  T(F): 97.9 (16 Aug 2020 16:37), Max: 98.6 (16 Aug 2020 01:17)  HR: 81 (16 Aug 2020 16:37) (80 - 84)  BP: 155/74 (16 Aug 2020 16:37) (120/75 - 155/74)  BP(mean): --  RR: 18 (16 Aug 2020 16:37) (16 - 18)  SpO2: 98% (16 Aug 2020 16:37) (98% - 98%)    Neurologic Examination:  General:  Appearance is consistent with chronologic age.  No abnormal facies.   General: The patient is oriented to person, place, time and date.  Recent and remote memory intact.  Fund of knowledge is intact and normal.  Language with normal repetition, comprehension and naming.  Nondysarthric.    Cranial nerves: intact VA, VFF.  EOMI w/o nystagmus, skew or reported double vision.  PERRL.  No ptosis/weakness of eyelid closure.  Facial sensation is normal with normal bite.  No facial asymmetry.  Hearing grossly intact b/l.  Palate elevates midline.  Tongue midline.  Motor examination:   Normal tone, bulk and range of motion.  No tenderness, twitching, tremors or involuntary movements.  Formal Muscle Strength Testing: (MRC grade R/L) 5/5 UE; 5/5 LE.  No observable drift.  Reflexes:   2+ b/l pectoralis, biceps, triceps, brachioradialis, patella and Achilles.  Plantar response downgoing b/l.  Jaw jerk, Naga, clonus absent.  Sensory examination:   Intact to light touch and pinprick, pain, temperature and proprioception and vibration in all extremities.  Cerebellum:   FTN/HKS intact with normal RON in all limbs.  No dysmetria or dysdiadokinesia.  Gait narrow based and normal.    Labs:   CBC Full  -  ( 16 Aug 2020 04:05 )  WBC Count : 6.20 K/uL  RBC Count : 3.97 M/uL  Hemoglobin : 11.8 g/dL  Hematocrit : 34.4 %  Platelet Count - Automated : 221 K/uL  Mean Cell Volume : 86.6 fL  Mean Cell Hemoglobin : 29.7 pg  Mean Cell Hemoglobin Concentration : 34.3 g/dL  Auto Neutrophil # : 4.57 K/uL  Auto Lymphocyte # : 0.94 K/uL  Auto Monocyte # : 0.64 K/uL  Auto Eosinophil # : 0.02 K/uL  Auto Basophil # : 0.03 K/uL  Auto Neutrophil % : 73.5 %  Auto Lymphocyte % : 15.1 %  Auto Monocyte % : 10.3 %  Auto Eosinophil % : 0.3 %  Auto Basophil % : 0.5 %    08-16    143  |  107  |  16  ----------------------------<  87  3.5   |  25  |  0.8    Ca    8.8      16 Aug 2020 11:25  Mg     1.9     08-16    TPro  6.3  /  Alb  3.7  /  TBili  0.7  /  DBili  <0.2  /  AST  51<H>  /  ALT  15  /  AlkPhos  164<H>  08-16    LIVER FUNCTIONS - ( 16 Aug 2020 11:25 )  Alb: 3.7 g/dL / Pro: 6.3 g/dL / ALK PHOS: 164 U/L / ALT: 15 U/L / AST: 51 U/L / GGT: x           PT/INR - ( 16 Aug 2020 04:05 )   PT: 14.90 sec;   INR: 1.30 ratio         PTT - ( 16 Aug 2020 04:05 )  PTT:27.5 sec  Urinalysis Basic - ( 16 Aug 2020 10:18 )    Color: Yellow / Appearance: Clear / SG: >1.050 / pH: x  Gluc: x / Ketone: Small  / Bili: Negative / Urobili: 3 mg/dL   Blood: x / Protein: 30 mg/dL / Nitrite: Negative   Leuk Esterase: Negative / RBC: 16 /HPF / WBC 3 /HPF   Sq Epi: x / Non Sq Epi: 2 /HPF / Bacteria: Negative          Neuroimaging:  NCHCT: CT Head No Cont:   EXAM:  CT BRAIN            PROCEDURE DATE:  08/16/2020            INTERPRETATION:  Clinical History / Reason for exam: Head injury. Trauma to head.    Technique: CT head without IV contrast performed.  Contiguous unenhanced CT axial images of the head from the base to the vertex with coronal and sagittal reformats.    Comparison: CT head May 18, 2020.    Findings:    The ventricles and cortical sulci are normal in size and configuration.   There is no acute intracranial hemorrhage, extra-axial fluid collection or midline shift.  Gray-white matter differentiation is maintained.    The visualized paranasal sinuses and mastoids are well-aerated. No depressed calvarial fracture.    IMPRESSION:    No evidence of acute intracranial pathology.                HUGO MERINO M.D., ATTENDING RADIOLOGIST  This document has been electronically signed. Aug 16 2020  7:55AM             (08-16-20 @ 07:51)    CT Angiography/Perfusion:  MRI Brain NC:  MRA Head/Neck:  EEG:    Assessment:  This is a 65y Male resident from [facility]  with PMH of Parkinson's dementia rapidly progressive, orthostatic  DM presents to ED after multiple falls in the setting of progressive physical and mental decline secondary to parkinson disease    Plan:   -   08-16-20 @ 20:18 Neurology Consult    Patient is a 65y old  Male who presents with a chief complaint of repeated falls (16 Aug 2020 15:15)      HPI:  66y/o M from [facility] with PMH of Parkinson's dementia rapidly progressive, orthostatic  DM presents to ED after multiple falls. In last 2 weeks fell 7/30, 8/4, 8/5, 8/7 and again day of admit. Pt this last time was a little diaphoretic, but afebrile. Was sent in by [place] for eval and COVID swab. Currently no complaints except generalized weakness and tiredness. He remembers the falls and said that even with walker/cane he is so unsteady that he cannot stand and feels overall weak. But denies CP, palpitations, SOB, abd pain, N/V/D/C. Spoke with daughter who explained that he's had Parkinson's for over 5 years and has rapidly declined, was put in [place] last Oct, and hasn't improved at all. He has become weaker, less stable, and lost QOL. Family already looking into Medicaid and long-term options. In ED: T98.6, HR 84, /82, RR18, SpO2 98%.   Labs significant for mild , trop 0.04 > 0.03, CK 1905, K 3.2 s/p PO repletion. UA neg, EKG no ischemic changes.  Panscan showed R acetabular fx (has prior hip replacement) but as per ortho non-operative at this stage. (16 Aug 2020 15:15)  Discussion with family  new code status - pt now deemed DNR/DNI No feeding tube with long term placement as Goals of care.    Neurology asked to evaluate and consult for his frequent falls in the settiing of his progressive Parkinson's dementia and worsening deconditioning..  - falls becoming more frequent as pt becomes weaker -pt will need long-term placement  - Re evaluate current medications carbidopa-levodopa, donepezil, pyridostigmine, olazapine  - Neuro eval (previously saw Dr. Josette Clark at [facility])          PAST MEDICAL & SURGICAL HISTORY:  Mild dementia  Parkinson's disease  Diabetes 1.5, managed as type 2  No significant past surgical history      FAMILY HISTORY:  No pertinent family history in first degree relatives      Social History: (-) x 3    Allergies    No Known Allergies    Intolerances        MEDICATIONS  (STANDING):  aspirin enteric coated 81 milliGRAM(s) Oral daily  atorvastatin 20 milliGRAM(s) Oral at bedtime  carbidopa/levodopa  25/100 1 Tablet(s) Oral every 6 hours  chlorhexidine 4% Liquid 1 Application(s) Topical <User Schedule>  donepezil 5 milliGRAM(s) Oral daily  enoxaparin Injectable 40 milliGRAM(s) SubCutaneous daily  famotidine    Tablet 20 milliGRAM(s) Oral daily  lactulose Syrup 20 Gram(s) Oral daily  magnesium oxide 400 milliGRAM(s) Oral daily  OLANZapine 10 milliGRAM(s) Oral at bedtime  pyridostigmine  milliGRAM(s) Oral at bedtime  senna 2 Tablet(s) Oral at bedtime  sodium chloride 0.9%. 1000 milliLiter(s) (75 mL/Hr) IV Continuous <Continuous>    MEDICATIONS  (PRN):      Review of systems:    Constitutional: No fever, weight loss or fatigue    Eyes: No eye pain or discharge  ENMT:  No difficulty hearing; No sinus or throat pain  Neck: No pain or stiffness  Respiratory: No cough, wheezing, chills or hemoptysis  Cardiovascular: No chest pain, palpitations, shortness of breath, dyspnea on exertion  Gastrointestinal: No abdominal pain, nausea, vomiting or hematemesis; No diarrhea or constipation.   Genitourinary: No dysuria, frequency, hematuria or incontinence  Neurological: As per HPI  Skin: No rashes or lesions   Endocrine: No heat or cold intolerance; No hair loss  Musculoskeletal: No joint pain or swelling  Psychiatric: No depression, anxiety, mood swings  Heme/Lymph: No easy bruising or bleeding gums    Vital Signs Last 24 Hrs  T(C): 36.6 (16 Aug 2020 16:37), Max: 37 (16 Aug 2020 01:17)  T(F): 97.9 (16 Aug 2020 16:37), Max: 98.6 (16 Aug 2020 01:17)  HR: 81 (16 Aug 2020 16:37) (80 - 84)  BP: 155/74 (16 Aug 2020 16:37) (120/75 - 155/74)  BP(mean): --  RR: 18 (16 Aug 2020 16:37) (16 - 18)  SpO2: 98% (16 Aug 2020 16:37) (98% - 98%)    Neurologic Examination:  General:  Appearance is consistent with chronologic age.  .   General: The patient is oriented to person, place, time and date.      Cranial nerves: intact VA, VFF.  EOMI w/o nystagmus, skew or reported double vision.  PERRL.  No ptosis/weakness of eyelid closure.  Facial sensation is normal with normal bite.  No facial asymmetry.  Hearing grossly intact b/l.  Palate elevates midline.  Tongue midline.  Motor examination:   Normal tone, bulk and limited range of motion.  (+) twitching, tremors   Formal Muscle Strength Testing: (MRC grade R/L) 5/5 UE; 5/5 LE.  Slow/Stiff  Reflexes:   2+ b/l pectoralis, biceps, triceps, brachioradialis, patella and Achilles.  Plantar response downgoing b/l.  Jaw jerk, Naga, clonus absent.  Sensory examination:   Intact to light touch and pinprick, pain, temperature and proprioception and vibration in all extremities.  Cerebellum:   FTN/HKS intact with normal RON in all limbs.    Labs:   CBC Full  -  ( 16 Aug 2020 04:05 )  WBC Count : 6.20 K/uL  RBC Count : 3.97 M/uL  Hemoglobin : 11.8 g/dL  Hematocrit : 34.4 %  Platelet Count - Automated : 221 K/uL  Mean Cell Volume : 86.6 fL  Mean Cell Hemoglobin : 29.7 pg  Mean Cell Hemoglobin Concentration : 34.3 g/dL  Auto Neutrophil # : 4.57 K/uL  Auto Lymphocyte # : 0.94 K/uL  Auto Monocyte # : 0.64 K/uL  Auto Eosinophil # : 0.02 K/uL  Auto Basophil # : 0.03 K/uL  Auto Neutrophil % : 73.5 %  Auto Lymphocyte % : 15.1 %  Auto Monocyte % : 10.3 %  Auto Eosinophil % : 0.3 %  Auto Basophil % : 0.5 %    08-16    143  |  107  |  16  ----------------------------<  87  3.5   |  25  |  0.8    Ca    8.8      16 Aug 2020 11:25  Mg     1.9     08-16    TPro  6.3  /  Alb  3.7  /  TBili  0.7  /  DBili  <0.2  /  AST  51<H>  /  ALT  15  /  AlkPhos  164<H>  08-16    LIVER FUNCTIONS - ( 16 Aug 2020 11:25 )  Alb: 3.7 g/dL / Pro: 6.3 g/dL / ALK PHOS: 164 U/L / ALT: 15 U/L / AST: 51 U/L / GGT: x           PT/INR - ( 16 Aug 2020 04:05 )   PT: 14.90 sec;   INR: 1.30 ratio         PTT - ( 16 Aug 2020 04:05 )  PTT:27.5 sec  Urinalysis Basic - ( 16 Aug 2020 10:18 )    Color: Yellow / Appearance: Clear / SG: >1.050 / pH: x  Gluc: x / Ketone: Small  / Bili: Negative / Urobili: 3 mg/dL   Blood: x / Protein: 30 mg/dL / Nitrite: Negative   Leuk Esterase: Negative / RBC: 16 /HPF / WBC 3 /HPF   Sq Epi: x / Non Sq Epi: 2 /HPF / Bacteria: Negative          Neuroimaging:  NCHCT: CT Head No Cont:   EXAM:  CT BRAIN            PROCEDURE DATE:  08/16/2020            INTERPRETATION:  Clinical History / Reason for exam: Head injury. Trauma to head.    Technique: CT head without IV contrast performed.  Contiguous unenhanced CT axial images of the head from the base to the vertex with coronal and sagittal reformats.    Comparison: CT head May 18, 2020.    Findings:    The ventricles and cortical sulci are normal in size and configuration.   There is no acute intracranial hemorrhage, extra-axial fluid collection or midline shift.  Gray-white matter differentiation is maintained.    The visualized paranasal sinuses and mastoids are well-aerated. No depressed calvarial fracture.    IMPRESSION:    No evidence of acute intracranial pathology.                HUGO MERINO M.D., ATTENDING RADIOLOGIST  This document has been electronically signed. Aug 16 2020  7:55AM             (08-16-20 @ 07:51)    CT Angiography/Perfusion:  MRI Brain NC:  MRA Head/Neck:  EEG:    Assessment:  This is a 65y Male resident from [facility]  with PMH of Parkinson's dementia rapidly progressive, orthostatic  DM presents to ED after multiple falls in the setting of progressive physical and mental decline secondary to parkinson disease    Plan: Please obtain MRI of the Brain- please complete safety evaluation form to ensure there is no contraindications         PMR/RehaB evaluation post review of MRI         will assess if medication can be augmented  -   08-16-20 @ 20:18

## 2020-08-16 NOTE — ED PROVIDER NOTE - OBJECTIVE STATEMENT
The pt is a 65 year old male with a history of DM, dementia sent in from assisted living facility s/p fall. Pt was found on the ground at nursing facility, no LOC, with an abrasion to right forearm. Per nursing facility note, pt has had multiple falls in the past few weeks.

## 2020-08-16 NOTE — H&P ADULT - NSHPLABSRESULTS_GEN_ALL_CORE
< from: CT Abdomen and Pelvis w/ IV Cont (08.16.20 @ 08:00) >    1.  Acute minimally displaced right posterior acetabular fracture.    2.  Otherwise, no evidence of acute traumatic injury within the chest, abdomen, or pelvis.    < end of copied text >    < from: CT Head No Cont (08.16.20 @ 07:51) >    IMPRESSION:    No evidence of acute intracranial pathology.    < end of copied text >

## 2020-08-16 NOTE — CHART NOTE - NSCHARTNOTEFT_GEN_A_CORE
Ortho Update Note    D/w ortho attending.  Planning for nonoperative management at this time.  Diet OK from ortho standpoint.  Keep nonweightbearing to RLE.  PT/OT/rehab.  DVT ppx as patient will be not be mobilizing.

## 2020-08-16 NOTE — ED ADULT NURSE NOTE - NSIMPLEMENTINTERV_GEN_ALL_ED
Implemented All Fall Risk Interventions:  [place] to call system. Call bell, personal items and telephone within reach. Instruct patient to call for assistance. Room bathroom lighting operational. Non-slip footwear when patient is off stretcher. Physically safe environment: no spills, clutter or unnecessary equipment. Stretcher in lowest position, wheels locked, appropriate side rails in place. Provide visual cue, wrist band, yellow gown, etc. Monitor gait and stability. Monitor for mental status changes and reorient to person, place, and time. Review medications for side effects contributing to fall risk. Reinforce activity limits and safety measures with patient and family.

## 2020-08-16 NOTE — ED PROVIDER NOTE - CARE PLAN
Principal Discharge DX:	Acetabular fracture  Secondary Diagnosis:	Elevated troponin  Secondary Diagnosis:	Hypokalemia  Secondary Diagnosis:	Frequent falls

## 2020-08-16 NOTE — ED PROVIDER NOTE - CLINICAL SUMMARY MEDICAL DECISION MAKING FREE TEXT BOX
pt w/ parkinsons presents w/ fall ealier today. ed w/u showing acute acetabular fx s/p ortho eval, pt also w/ incidentally elevated trop, hypokalemia, gait instability

## 2020-08-16 NOTE — ED PROVIDER NOTE - PHYSICAL EXAMINATION
CONSTITUTIONAL: elderly male laying in stretcher; in no acute distress.   SKIN: +abrasion on forehead +abrasion on right forearm.   HEAD: Normocephalic; atraumatic.  EYES: EOMI, normal sclera and conjunctiva   ENT: No nasal discharge; airway clear.  NECK: Supple; non tender.  CARD: S1, S2 normal; no murmurs, gallops, or rubs. Regular rate and rhythm.   RESP: No wheezes, rales or rhonchi.  ABD: soft ntnd  EXT: Normal ROM.  No clubbing, cyanosis or edema.   LYMPH: No acute cervical adenopathy.  NEURO: moving all four extremities. unable to fully assess due to dementia. following commands.   PSYCH: Cooperative, unable to fully assess due to dementia

## 2020-08-16 NOTE — ED PROVIDER NOTE - PROGRESS NOTE DETAILS
pt endorsed to me by Dr. Christianson pending ct scans. d/w ortho re acetabular fx. will see patient sierra - s/o to Dr. Manriquez. Pending scans. Please note ADMIT TO Dr. Rony Salinas. See his note in the chart.

## 2020-08-16 NOTE — GOALS OF CARE CONVERSATION - ADVANCED CARE PLANNING - CONVERSATION DETAILS
Called daughter Mandi HCP and discussed DNR/DNI and overall poor prognosis. Daughter and son fully aware that he is rapidly declining. Called daughter Mandi HCP and discussed DNR/DNI and overall poor prognosis. Daughter and son fully aware that he is rapidly declining and all agree his quality of life is poor.   They know that the dementia is likely progressing and not curable. He doesn't feel like he has quality of life and said "if I go, just pull the plug". Daughter agrees as well that a resuscitation is unlikely to benefit him long term. Also they agreed that he wouldn't want a feeding tube.    Patient will likely need increased care on discharge to SNF, versus [place] which is another step down which family had already been looking into.     HCP and patient agree on DNR/DNI and no invasive feeding tubes. On further discussion with daughter, she agrees that she likely wouldn't agree to any major invasive treatments.

## 2020-08-16 NOTE — H&P ADULT - NSHPPHYSICALEXAM_GEN_ALL_CORE
-trend H-H   -Protonix 40 mg  BID, Carafate   GI Sideridis consult GENERAL: NAD, elderly M looks older than stated age, tired and chronically sick looking  HEENT:  Atraumatic, Normocephalic; EOMI, PERRLA, conjunctiva pink, sclera white, Supple, No JVD, thyromegally or LYAD appreciated, no midline neck pain  CHEST/LUNG: Clear to auscultation but somewhat overall dec BS; No wheeze/crackles  HEART: Regular rate and rhythm; No murmurs  ABDOMEN: Soft, Nontender, Nondistended; Bowel sounds present  EXTREMITIES:  2+ Peripheral Pulses, 1+ peripheral pitting edema  PSYCH: AAOx3, flattened affect  NEUROLOGY: non-focal, gait not tested, pronounced tremor   SKIN: abrasion over R temple/L shin & L elbow; has deep tissue injury over sacrum with a deeply ridged scar over sacrum (as per pt had prior wound there)

## 2020-08-17 LAB
ANION GAP SERPL CALC-SCNC: 9 MMOL/L — SIGNIFICANT CHANGE UP (ref 7–14)
BASOPHILS # BLD AUTO: 0.04 K/UL — SIGNIFICANT CHANGE UP (ref 0–0.2)
BASOPHILS NFR BLD AUTO: 0.7 % — SIGNIFICANT CHANGE UP (ref 0–1)
BUN SERPL-MCNC: 18 MG/DL — SIGNIFICANT CHANGE UP (ref 10–20)
CALCIUM SERPL-MCNC: 8.8 MG/DL — SIGNIFICANT CHANGE UP (ref 8.5–10.1)
CHLORIDE SERPL-SCNC: 108 MMOL/L — SIGNIFICANT CHANGE UP (ref 98–110)
CK SERPL-CCNC: 1934 U/L — HIGH (ref 0–225)
CO2 SERPL-SCNC: 25 MMOL/L — SIGNIFICANT CHANGE UP (ref 17–32)
CREAT SERPL-MCNC: 0.7 MG/DL — SIGNIFICANT CHANGE UP (ref 0.7–1.5)
EOSINOPHIL # BLD AUTO: 0.1 K/UL — SIGNIFICANT CHANGE UP (ref 0–0.7)
EOSINOPHIL NFR BLD AUTO: 1.6 % — SIGNIFICANT CHANGE UP (ref 0–8)
GLUCOSE BLDC GLUCOMTR-MCNC: 106 MG/DL — HIGH (ref 70–99)
GLUCOSE BLDC GLUCOMTR-MCNC: 109 MG/DL — HIGH (ref 70–99)
GLUCOSE BLDC GLUCOMTR-MCNC: 134 MG/DL — HIGH (ref 70–99)
GLUCOSE SERPL-MCNC: 94 MG/DL — SIGNIFICANT CHANGE UP (ref 70–99)
HCT VFR BLD CALC: 33.5 % — LOW (ref 42–52)
HGB BLD-MCNC: 11.4 G/DL — LOW (ref 14–18)
IMM GRANULOCYTES NFR BLD AUTO: 0.2 % — SIGNIFICANT CHANGE UP (ref 0.1–0.3)
LYMPHOCYTES # BLD AUTO: 1.08 K/UL — LOW (ref 1.2–3.4)
LYMPHOCYTES # BLD AUTO: 17.7 % — LOW (ref 20.5–51.1)
MAGNESIUM SERPL-MCNC: 1.9 MG/DL — SIGNIFICANT CHANGE UP (ref 1.8–2.4)
MCHC RBC-ENTMCNC: 29.9 PG — SIGNIFICANT CHANGE UP (ref 27–31)
MCHC RBC-ENTMCNC: 34 G/DL — SIGNIFICANT CHANGE UP (ref 32–37)
MCV RBC AUTO: 87.9 FL — SIGNIFICANT CHANGE UP (ref 80–94)
MONOCYTES # BLD AUTO: 0.65 K/UL — HIGH (ref 0.1–0.6)
MONOCYTES NFR BLD AUTO: 10.7 % — HIGH (ref 1.7–9.3)
NEUTROPHILS # BLD AUTO: 4.21 K/UL — SIGNIFICANT CHANGE UP (ref 1.4–6.5)
NEUTROPHILS NFR BLD AUTO: 69.1 % — SIGNIFICANT CHANGE UP (ref 42.2–75.2)
NRBC # BLD: 0 /100 WBCS — SIGNIFICANT CHANGE UP (ref 0–0)
PLATELET # BLD AUTO: 210 K/UL — SIGNIFICANT CHANGE UP (ref 130–400)
POTASSIUM SERPL-MCNC: 3.3 MMOL/L — LOW (ref 3.5–5)
POTASSIUM SERPL-SCNC: 3.3 MMOL/L — LOW (ref 3.5–5)
RBC # BLD: 3.81 M/UL — LOW (ref 4.7–6.1)
RBC # FLD: 12.8 % — SIGNIFICANT CHANGE UP (ref 11.5–14.5)
SARS-COV-2 RNA SPEC QL NAA+PROBE: SIGNIFICANT CHANGE UP
SODIUM SERPL-SCNC: 142 MMOL/L — SIGNIFICANT CHANGE UP (ref 135–146)
WBC # BLD: 6.09 K/UL — SIGNIFICANT CHANGE UP (ref 4.8–10.8)
WBC # FLD AUTO: 6.09 K/UL — SIGNIFICANT CHANGE UP (ref 4.8–10.8)

## 2020-08-17 PROCEDURE — 99232 SBSQ HOSP IP/OBS MODERATE 35: CPT

## 2020-08-17 RX ORDER — POTASSIUM CHLORIDE 20 MEQ
40 PACKET (EA) ORAL ONCE
Refills: 0 | Status: COMPLETED | OUTPATIENT
Start: 2020-08-17 | End: 2020-08-17

## 2020-08-17 RX ORDER — SODIUM CHLORIDE 9 MG/ML
1000 INJECTION INTRAMUSCULAR; INTRAVENOUS; SUBCUTANEOUS
Refills: 0 | Status: DISCONTINUED | OUTPATIENT
Start: 2020-08-17 | End: 2020-08-19

## 2020-08-17 RX ORDER — POLYETHYLENE GLYCOL 3350 17 G/17G
17 POWDER, FOR SOLUTION ORAL ONCE
Refills: 0 | Status: COMPLETED | OUTPATIENT
Start: 2020-08-17 | End: 2020-08-17

## 2020-08-17 RX ADMIN — CARBIDOPA AND LEVODOPA 1 TABLET(S): 25; 100 TABLET ORAL at 05:57

## 2020-08-17 RX ADMIN — MAGNESIUM OXIDE 400 MG ORAL TABLET 400 MILLIGRAM(S): 241.3 TABLET ORAL at 14:53

## 2020-08-17 RX ADMIN — POLYETHYLENE GLYCOL 3350 17 GRAM(S): 17 POWDER, FOR SOLUTION ORAL at 13:58

## 2020-08-17 RX ADMIN — CARBIDOPA AND LEVODOPA 1 TABLET(S): 25; 100 TABLET ORAL at 00:03

## 2020-08-17 RX ADMIN — Medication 40 MILLIEQUIVALENT(S): at 13:59

## 2020-08-17 RX ADMIN — SODIUM CHLORIDE 100 MILLILITER(S): 9 INJECTION INTRAMUSCULAR; INTRAVENOUS; SUBCUTANEOUS at 15:08

## 2020-08-17 RX ADMIN — ATORVASTATIN CALCIUM 20 MILLIGRAM(S): 80 TABLET, FILM COATED ORAL at 21:31

## 2020-08-17 RX ADMIN — PYRIDOSTIGMINE BROMIDE 180 MILLIGRAM(S): 60 SOLUTION ORAL at 21:31

## 2020-08-17 RX ADMIN — FAMOTIDINE 20 MILLIGRAM(S): 10 INJECTION INTRAVENOUS at 13:58

## 2020-08-17 RX ADMIN — DONEPEZIL HYDROCHLORIDE 5 MILLIGRAM(S): 10 TABLET, FILM COATED ORAL at 13:57

## 2020-08-17 RX ADMIN — SODIUM CHLORIDE 75 MILLILITER(S): 9 INJECTION INTRAMUSCULAR; INTRAVENOUS; SUBCUTANEOUS at 05:58

## 2020-08-17 RX ADMIN — Medication 81 MILLIGRAM(S): at 13:57

## 2020-08-17 RX ADMIN — OLANZAPINE 10 MILLIGRAM(S): 15 TABLET, FILM COATED ORAL at 21:31

## 2020-08-17 RX ADMIN — ENOXAPARIN SODIUM 40 MILLIGRAM(S): 100 INJECTION SUBCUTANEOUS at 13:57

## 2020-08-17 RX ADMIN — SENNA PLUS 2 TABLET(S): 8.6 TABLET ORAL at 21:31

## 2020-08-17 RX ADMIN — LACTULOSE 20 GRAM(S): 10 SOLUTION ORAL at 13:58

## 2020-08-17 RX ADMIN — CARBIDOPA AND LEVODOPA 1 TABLET(S): 25; 100 TABLET ORAL at 23:59

## 2020-08-17 RX ADMIN — CARBIDOPA AND LEVODOPA 1 TABLET(S): 25; 100 TABLET ORAL at 17:52

## 2020-08-17 RX ADMIN — CARBIDOPA AND LEVODOPA 1 TABLET(S): 25; 100 TABLET ORAL at 13:57

## 2020-08-17 NOTE — OCCUPATIONAL THERAPY INITIAL EVALUATION ADULT - PERTINENT HX OF CURRENT PROBLEM, REHAB EVAL
66y/o M from [facility] with PMH of Parkinson's dementia rapidly progressive, orthostatic  DM presents to ED after multiple falls. Panscan showed R acetabular fx (has prior hip replacement) but as per ortho non-operative at this stage.

## 2020-08-17 NOTE — PROGRESS NOTE ADULT - SUBJECTIVE AND OBJECTIVE BOX
Pt chart and preceding events 24 hrs noted  Will see pt later today Pt chart and preceding events 24 hrs noted  Will see pt later today  -------------  Pt seen earlier  Sluggish responses  Has parkinson's and autonomic dysfunction with orthostatic hypotension issue and possibly  Shy ?  Seen by neurology  MRI brain ordered  Ortho eval noted, conservative treatment  advised      SOCIAL HISTORY:    REVIEW OF SYSTEMS:    Constitutional: No fever, weight loss or fatigue  ENT:  No difficulty hearing, tinnitus, vertigo; No sinus or throat pain  Neck: No pain or stiffness  Respiratory: No cough, wheezing, chills or hemoptysis  Cardiovascular: No chest pain, palpitations, shortness of breath, dizziness or leg swelling  Gastrointestinal: No abdominal or epigastric pain. No nausea, vomiting or hematemesis; No diarrhea or constipation. No melena or hematochezia.  Neurological: No headaches, memory loss, (++)   decreased  strength, numbness or tremors  Musculoskeletal:  (++)  joint pain or swelling; No muscle, back or extremity pain  Psychiatric: No depression, anxiety, mood swings or difficulty sleeping    MEDICATIONS  (STANDING):  aspirin enteric coated 81 milliGRAM(s) Oral daily  atorvastatin 20 milliGRAM(s) Oral at bedtime  carbidopa/levodopa  25/100 1 Tablet(s) Oral every 6 hours  chlorhexidine 4% Liquid 1 Application(s) Topical <User Schedule>  donepezil 5 milliGRAM(s) Oral daily  enoxaparin Injectable 40 milliGRAM(s) SubCutaneous daily  famotidine    Tablet 20 milliGRAM(s) Oral daily  lactulose Syrup 20 Gram(s) Oral daily  magnesium oxide 400 milliGRAM(s) Oral daily  OLANZapine 10 milliGRAM(s) Oral at bedtime  potassium chloride   Powder 40 milliEquivalent(s) Oral once  pyridostigmine  milliGRAM(s) Oral at bedtime  senna 2 Tablet(s) Oral at bedtime  sodium chloride 0.9%. 1000 milliLiter(s) (100 mL/Hr) IV Continuous <Continuous>    MEDICATIONS  (PRN):      Vital Signs Last 24 Hrs  T(C): 35.3 (17 Aug 2020 08:00), Max: 36.6 (16 Aug 2020 16:18)  T(F): 95.5 (17 Aug 2020 08:00), Max: 97.9 (16 Aug 2020 16:18)  HR: 72 (17 Aug 2020 08:00) (72 - 83)  BP: 160/83 (17 Aug 2020 08:00) (140/105 - 160/83)  BP(mean): --  RR: 18 (17 Aug 2020 08:00) (18 - 18)  SpO2: 98% (17 Aug 2020 00:45) (98% - 98%)    PHYSICAL EXAM:    Constitutional: NAD, well-groomed, well-developed  HEENT: PERRLA, EOMI, Normal Hearing, MMM  Neck: No LAD, No JVD  Back: Normal spine flexure, No CVA tenderness  Respiratory: CTAB/L  Cardiovascular: S1 and S2, RRR, no M/G/R  Gastrointestinal: BS+, soft, NT/ND  Extremities: No peripheral edema  Vascular: 2+ peripheral pulses  Neurological: A/O x 2, no focal deficits    LABS:                        11.4   6.09  )-----------( 210      ( 17 Aug 2020 05:31 )             33.5     08-17    142  |  108  |  18  ----------------------------<  94  3.3<L>   |  25  |  0.7    Ca    8.8      17 Aug 2020 05:31  Mg     1.9     08-17    TPro  6.3  /  Alb  3.7  /  TBili  0.7  /  DBili  <0.2  /  AST  51<H>  /  ALT  15  /  AlkPhos  164<H>  08-16    PT/INR - ( 16 Aug 2020 04:05 )   PT: 14.90 sec;   INR: 1.30 ratio         PTT - ( 16 Aug 2020 04:05 )  PTT:27.5 sec  Urinalysis Basic - ( 16 Aug 2020 10:18 )    Color: Yellow / Appearance: Clear / SG: >1.050 / pH: x  Gluc: x / Ketone: Small  / Bili: Negative / Urobili: 3 mg/dL   Blood: x / Protein: 30 mg/dL / Nitrite: Negative   Leuk Esterase: Negative / RBC: 16 /HPF / WBC 3 /HPF   Sq Epi: x / Non Sq Epi: 2 /HPF / Bacteria: Negative      Drug Screen Urine:  Alcohol Level  Alcohol, Blood: <10 mg/dL (08-16-20 @ 04:05)        RADIOLOGY & ADDITIONAL STUDIES: noted in PACS

## 2020-08-17 NOTE — OCCUPATIONAL THERAPY INITIAL EVALUATION ADULT - TRANSFER SAFETY CONCERNS NOTED: SIT/STAND, REHAB EVAL
decreased safety awareness/inability to maintain weight-bearing restrictions w/o assist/decreased weight-shifting ability

## 2020-08-17 NOTE — PROGRESS NOTE ADULT - ASSESSMENT
Falls  Syncope?  Parkinson  Fx R acetabular area      PT/Rehab  neurology input on medication adjustments/dosages?  MRI brain

## 2020-08-17 NOTE — PHYSICAL THERAPY INITIAL EVALUATION ADULT - GENERAL OBSERVATIONS, REHAB EVAL
[id number] pm. 64 y/o M received/left in bed, nad, + bed alarm. pt is alert. oriented to self, states he is at [facility], thinks it's February 2020. pleasant, follows simple VC's. pt did not report RT hip pain with mobility

## 2020-08-17 NOTE — PROGRESS NOTE ADULT - ASSESSMENT
Yoni Thompson   MRN: [id number]    Department:  [facility] Emergency Department   Date of Visit:  12/13/2019           Disclosure     Insurance plans vary and the physician(s) referred by the ER may not be covered by your plan.  Please con CARE PHYSICIAN AT ONCE OR RETURN IMMEDIATELY TO THE EMERGENCY DEPARTMENT. If you have been prescribed any medication(s), please fill your prescription right away and begin taking the medication(s) as directed.   If you believe that any of the medications 66y/o M with progressive Parkinson's dementia and multiple falls and decreasing QOL.     #Frequent falls likely due to Parkinson's dementia and worsening deconditioning  - Neurology recommended an MRI of the brain to be done   - c/w carbidopa-levodopa, donepezil, pyridostigmine, olazapine  - Will also f/u with neurology considering the dosing of his medications   - Orthostatics needed in order to assess ( will try to do it while the patient stands on his left foot)       #R acetabular fx  - R acetabular fx currently no operative tx planned  - No weight bearing on this foot   - Ortho won't do any operative procedure thus far   - PT on board    #Constipation:   - Senna   - Today we added Miralax       #MISC  - DVT ppx: lovenox  - GI ppx: pepcid

## 2020-08-17 NOTE — OCCUPATIONAL THERAPY INITIAL EVALUATION ADULT - LIVES WITH, PROFILE
Pt lives at [facility] with elevator to get to pt's floor. Facility has all DME required for safe ADLs. Per pt, performed transfers with RW.

## 2020-08-17 NOTE — PHYSICAL THERAPY INITIAL EVALUATION ADULT - LEVEL OF INDEPENDENCE: GAIT, REHAB EVAL
unable to perform/standing tolerance only - 5 sec with LT hand on bedrail, mod A on RT. pt has difficulty maintaining NWB RT LE and tries to put RT toes down

## 2020-08-17 NOTE — OCCUPATIONAL THERAPY INITIAL EVALUATION ADULT - GENERAL OBSERVATIONS, REHAB EVAL
Pt encountered in bed in NAD, +IV infusing. Pt disoriented to place and time. Pt unable to maintain RLE NWB status at this time 2/2 to impaired judgment. Transfer to chair not performed. Pt returned to bed in NAD. RN aware.

## 2020-08-17 NOTE — PROGRESS NOTE ADULT - SUBJECTIVE AND OBJECTIVE BOX
SUBJECTIVE:    Patient is a 65y old Male who presents with a chief complaint of repeated falls (17 Aug 2020 07:57)    Currently admitted to medicine with the primary diagnosis of Acetabular fracture     Today is hospital day 1d. This morning he is resting comfortably in bed and reports no new issues or overnight events.     PAST MEDICAL & SURGICAL HISTORY  Mild dementia  Parkinson's disease  Diabetes 1.5, managed as type 2  No significant past surgical history    SOCIAL HISTORY:  Negative for smoking/alcohol/drug use.     ALLERGIES:  No Known Allergies    MEDICATIONS:  STANDING MEDICATIONS  aspirin enteric coated 81 milliGRAM(s) Oral daily  atorvastatin 20 milliGRAM(s) Oral at bedtime  carbidopa/levodopa  25/100 1 Tablet(s) Oral every 6 hours  chlorhexidine 4% Liquid 1 Application(s) Topical <User Schedule>  donepezil 5 milliGRAM(s) Oral daily  enoxaparin Injectable 40 milliGRAM(s) SubCutaneous daily  famotidine    Tablet 20 milliGRAM(s) Oral daily  lactulose Syrup 20 Gram(s) Oral daily  magnesium oxide 400 milliGRAM(s) Oral daily  OLANZapine 10 milliGRAM(s) Oral at bedtime  polyethylene glycol 3350 17 Gram(s) Oral once  potassium chloride   Powder 40 milliEquivalent(s) Oral once  pyridostigmine  milliGRAM(s) Oral at bedtime  senna 2 Tablet(s) Oral at bedtime  sodium chloride 0.9%. 1000 milliLiter(s) IV Continuous <Continuous>    PRN MEDICATIONS    VITALS:   T(F): 95.5  HR: 72  BP: 160/83  RR: 18  SpO2: 98%    LABS:                        11.4   6.09  )-----------( 210      ( 17 Aug 2020 05:31 )             33.5     08-17    142  |  108  |  18  ----------------------------<  94  3.3<L>   |  25  |  0.7    Ca    8.8      17 Aug 2020 05:31  Mg     1.9     08-17    TPro  6.3  /  Alb  3.7  /  TBili  0.7  /  DBili  <0.2  /  AST  51<H>  /  ALT  15  /  AlkPhos  164<H>  08-16    PT/INR - ( 16 Aug 2020 04:05 )   PT: 14.90 sec;   INR: 1.30 ratio         PTT - ( 16 Aug 2020 04:05 )  PTT:27.5 sec  Urinalysis Basic - ( 16 Aug 2020 10:18 )    Color: Yellow / Appearance: Clear / SG: >1.050 / pH: x  Gluc: x / Ketone: Small  / Bili: Negative / Urobili: 3 mg/dL   Blood: x / Protein: 30 mg/dL / Nitrite: Negative   Leuk Esterase: Negative / RBC: 16 /HPF / WBC 3 /HPF   Sq Epi: x / Non Sq Epi: 2 /HPF / Bacteria: Negative      Creatine Kinase, Serum: 1934 U/L <H> (08-17-20 @ 05:31)    CARDIAC MARKERS ( 17 Aug 2020 05:31 )  x     / x     / 1934 U/L / x     / x      CARDIAC MARKERS ( 16 Aug 2020 11:25 )  x     / 0.03 ng/mL / 1905 U/L / x     / x      CARDIAC MARKERS ( 16 Aug 2020 04:05 )  x     / 0.04 ng/mL / x     / x     / x          RADIOLOGY:    MRI brain ordered today. As per his daughter the patient has a right hip replacement and a right ankle reconstruction ( [facility] ??) I called the MRI team ( [phone]) and they informed me that it should be safe to have the MRI done.    PHYSICAL EXAM:  GEN: No acute distress  LUNGS: Clear to auscultation bilaterally   HEART: S1/S2 present. RRR. Systolic ejection murmur heard at the 2nd right intercostal space.   ABD: Soft, non-tender, non-distended. Bowel sounds present  EXT: NC/NC/NE/2+PP/BROWNE/Skin Intact.   NEURO: Awake and Alert but Orientation to person/place/time is not feasible due to the patient's condition

## 2020-08-18 LAB
ALBUMIN SERPL ELPH-MCNC: 3.1 G/DL — LOW (ref 3.5–5.2)
ALP SERPL-CCNC: 146 U/L — HIGH (ref 30–115)
ALT FLD-CCNC: 5 U/L — SIGNIFICANT CHANGE UP (ref 0–41)
ANION GAP SERPL CALC-SCNC: 8 MMOL/L — SIGNIFICANT CHANGE UP (ref 7–14)
AST SERPL-CCNC: 42 U/L — HIGH (ref 0–41)
BASOPHILS # BLD AUTO: 0.05 K/UL — SIGNIFICANT CHANGE UP (ref 0–0.2)
BASOPHILS NFR BLD AUTO: 1.1 % — HIGH (ref 0–1)
BILIRUB SERPL-MCNC: 0.6 MG/DL — SIGNIFICANT CHANGE UP (ref 0.2–1.2)
BUN SERPL-MCNC: 12 MG/DL — SIGNIFICANT CHANGE UP (ref 10–20)
CA-I BLD-SCNC: 1.22 MMOL/L — SIGNIFICANT CHANGE UP (ref 1.12–1.3)
CALCIUM SERPL-MCNC: 8.3 MG/DL — LOW (ref 8.5–10.1)
CHLORIDE SERPL-SCNC: 108 MMOL/L — SIGNIFICANT CHANGE UP (ref 98–110)
CO2 SERPL-SCNC: 26 MMOL/L — SIGNIFICANT CHANGE UP (ref 17–32)
CREAT SERPL-MCNC: 0.8 MG/DL — SIGNIFICANT CHANGE UP (ref 0.7–1.5)
EOSINOPHIL # BLD AUTO: 0.11 K/UL — SIGNIFICANT CHANGE UP (ref 0–0.7)
EOSINOPHIL NFR BLD AUTO: 2.4 % — SIGNIFICANT CHANGE UP (ref 0–8)
GLUCOSE SERPL-MCNC: 87 MG/DL — SIGNIFICANT CHANGE UP (ref 70–99)
HCT VFR BLD CALC: 31.6 % — LOW (ref 42–52)
HGB BLD-MCNC: 10.6 G/DL — LOW (ref 14–18)
IMM GRANULOCYTES NFR BLD AUTO: 0.2 % — SIGNIFICANT CHANGE UP (ref 0.1–0.3)
LYMPHOCYTES # BLD AUTO: 1.13 K/UL — LOW (ref 1.2–3.4)
LYMPHOCYTES # BLD AUTO: 24.6 % — SIGNIFICANT CHANGE UP (ref 20.5–51.1)
MAGNESIUM SERPL-MCNC: 1.6 MG/DL — LOW (ref 1.8–2.4)
MCHC RBC-ENTMCNC: 30.3 PG — SIGNIFICANT CHANGE UP (ref 27–31)
MCHC RBC-ENTMCNC: 33.5 G/DL — SIGNIFICANT CHANGE UP (ref 32–37)
MCV RBC AUTO: 90.3 FL — SIGNIFICANT CHANGE UP (ref 80–94)
MONOCYTES # BLD AUTO: 0.47 K/UL — SIGNIFICANT CHANGE UP (ref 0.1–0.6)
MONOCYTES NFR BLD AUTO: 10.2 % — HIGH (ref 1.7–9.3)
NEUTROPHILS # BLD AUTO: 2.82 K/UL — SIGNIFICANT CHANGE UP (ref 1.4–6.5)
NEUTROPHILS NFR BLD AUTO: 61.5 % — SIGNIFICANT CHANGE UP (ref 42.2–75.2)
NRBC # BLD: 0 /100 WBCS — SIGNIFICANT CHANGE UP (ref 0–0)
PLATELET # BLD AUTO: 201 K/UL — SIGNIFICANT CHANGE UP (ref 130–400)
POTASSIUM SERPL-MCNC: 3.8 MMOL/L — SIGNIFICANT CHANGE UP (ref 3.5–5)
POTASSIUM SERPL-SCNC: 3.8 MMOL/L — SIGNIFICANT CHANGE UP (ref 3.5–5)
PROT SERPL-MCNC: 5.3 G/DL — LOW (ref 6–8)
RBC # BLD: 3.5 M/UL — LOW (ref 4.7–6.1)
RBC # FLD: 12.9 % — SIGNIFICANT CHANGE UP (ref 11.5–14.5)
SODIUM SERPL-SCNC: 142 MMOL/L — SIGNIFICANT CHANGE UP (ref 135–146)
WBC # BLD: 4.59 K/UL — LOW (ref 4.8–10.8)
WBC # FLD AUTO: 4.59 K/UL — LOW (ref 4.8–10.8)

## 2020-08-18 PROCEDURE — 70551 MRI BRAIN STEM W/O DYE: CPT | Mod: 26

## 2020-08-18 RX ADMIN — CARBIDOPA AND LEVODOPA 1 TABLET(S): 25; 100 TABLET ORAL at 23:44

## 2020-08-18 RX ADMIN — LACTULOSE 20 GRAM(S): 10 SOLUTION ORAL at 11:29

## 2020-08-18 RX ADMIN — PYRIDOSTIGMINE BROMIDE 180 MILLIGRAM(S): 60 SOLUTION ORAL at 21:30

## 2020-08-18 RX ADMIN — Medication 81 MILLIGRAM(S): at 11:26

## 2020-08-18 RX ADMIN — ENOXAPARIN SODIUM 40 MILLIGRAM(S): 100 INJECTION SUBCUTANEOUS at 11:27

## 2020-08-18 RX ADMIN — MAGNESIUM OXIDE 400 MG ORAL TABLET 400 MILLIGRAM(S): 241.3 TABLET ORAL at 11:27

## 2020-08-18 RX ADMIN — OLANZAPINE 10 MILLIGRAM(S): 15 TABLET, FILM COATED ORAL at 21:30

## 2020-08-18 RX ADMIN — FAMOTIDINE 20 MILLIGRAM(S): 10 INJECTION INTRAVENOUS at 11:26

## 2020-08-18 RX ADMIN — SODIUM CHLORIDE 100 MILLILITER(S): 9 INJECTION INTRAMUSCULAR; INTRAVENOUS; SUBCUTANEOUS at 19:45

## 2020-08-18 RX ADMIN — CHLORHEXIDINE GLUCONATE 1 APPLICATION(S): 213 SOLUTION TOPICAL at 05:24

## 2020-08-18 RX ADMIN — CARBIDOPA AND LEVODOPA 1 TABLET(S): 25; 100 TABLET ORAL at 05:24

## 2020-08-18 RX ADMIN — SENNA PLUS 2 TABLET(S): 8.6 TABLET ORAL at 21:30

## 2020-08-18 RX ADMIN — CARBIDOPA AND LEVODOPA 1 TABLET(S): 25; 100 TABLET ORAL at 11:26

## 2020-08-18 RX ADMIN — ATORVASTATIN CALCIUM 20 MILLIGRAM(S): 80 TABLET, FILM COATED ORAL at 21:33

## 2020-08-18 RX ADMIN — DONEPEZIL HYDROCHLORIDE 5 MILLIGRAM(S): 10 TABLET, FILM COATED ORAL at 11:26

## 2020-08-18 RX ADMIN — CARBIDOPA AND LEVODOPA 1 TABLET(S): 25; 100 TABLET ORAL at 17:29

## 2020-08-18 NOTE — PROGRESS NOTE ADULT - SUBJECTIVE AND OBJECTIVE BOX
SUBJECTIVE:    Patient is a 65y old Male who presents with a chief complaint of repeated falls (18 Aug 2020 08:25)    Currently admitted to medicine with the primary diagnosis of Acetabular fracture     Today is hospital day 2d. This morning he is resting comfortably in bed  no new issues or overnight events.     PAST MEDICAL & SURGICAL HISTORY  Mild dementia  Parkinson's disease  Diabetes 1.5, managed as type 2  No significant past surgical history    SOCIAL HISTORY:  Negative for smoking/alcohol/drug use.     ALLERGIES:  No Known Allergies    MEDICATIONS:  STANDING MEDICATIONS  aspirin enteric coated 81 milliGRAM(s) Oral daily  atorvastatin 20 milliGRAM(s) Oral at bedtime  carbidopa/levodopa  25/100 1 Tablet(s) Oral every 6 hours  chlorhexidine 4% Liquid 1 Application(s) Topical <User Schedule>  donepezil 5 milliGRAM(s) Oral daily  enoxaparin Injectable 40 milliGRAM(s) SubCutaneous daily  famotidine    Tablet 20 milliGRAM(s) Oral daily  lactulose Syrup 20 Gram(s) Oral daily  magnesium oxide 400 milliGRAM(s) Oral daily  OLANZapine 10 milliGRAM(s) Oral at bedtime  pyridostigmine  milliGRAM(s) Oral at bedtime  senna 2 Tablet(s) Oral at bedtime  sodium chloride 0.9%. 1000 milliLiter(s) IV Continuous <Continuous>    PRN MEDICATIONS    VITALS:   T(F): 96.1  HR: 92  BP: 165/95  RR: 18  SpO2: 98%    LABS:                        10.6   4.59  )-----------( 201      ( 18 Aug 2020 04:30 )             31.6     08-18    142  |  108  |  12  ----------------------------<  87  3.8   |  26  |  0.8    Ca    8.3<L>      18 Aug 2020 04:30  Mg     1.6     08-18    TPro  5.3<L>  /  Alb  3.1<L>  /  TBili  0.6  /  DBili  x   /  AST  42<H>  /  ALT  5   /  AlkPhos  146<H>  08-18              CARDIAC MARKERS ( 17 Aug 2020 05:31 )  x     / x     / 1934 U/L / x     / x          RADIOLOGY:  CTH 8/16:  No evidence of acute intracranial pathology.    CT cervical spine 8/16:  No evidence of acute fracture of the cervical spine.    CT chest, abdomen& pelvis 8/16:  1.  Acute minimally displaced right posterior acetabular fracture.  2.  Otherwise, no evidence of acute traumatic injury within the chest, abdomen, or pelvis.    Xray Rt femur 8/16:  Partially imaged acute fracture of the right posterior acetabulum. No additional fracture is identified. Status post right total hip replacement. Moderate tricompartmental degenerative changes of the right knee. Vascular calcifications.    MRI brain 8/18:  There is no evidence of acute intracranial pathology. No evidence of acute infarct, intracranial hemorrhage or mass effect.  Mild cortical volume loss.        PHYSICAL EXAM:  GEN: No acute distress  LUNGS: Clear to auscultation bilaterally   HEART: Regular  ABD: Soft, non-tender, non-distended.  EXT: NC/NC/NE/2+PP/BROWNE/Skin Intact.   NEURO: AAOX3

## 2020-08-18 NOTE — CONSULT NOTE ADULT - SUBJECTIVE AND OBJECTIVE BOX
HPI:  66y/o M from [facility] with PMH of Parkinson's dementia rapidly progressive, orthostatic  DM presents to ED after multiple falls. In last 2 weeks fell 7/30, 8/4, 8/5, 8/7 and again day of admit. Pt this last time was a little diaphoretic, but afebrile. Was sent in by [facility] for eval and COVID swab. Currently no complaints except generalized weakness and tiredness. He remembers the falls and said that even with walker/cane he is so unsteady that he cannot stand and feels overall weak. But denies CP, palpitations, SOB, abd pain, N/V/D/C. Spoke with daughter who explained that he's had Parkinson's for over 5 years and has rapidly declined, was put in [facility] last Oct, and hasn't improved at all. He has become weaker, less stable, and lost QOL. Family already looking into Medicaid and long-term options.     In ED: T98.6, HR 84, /82, RR18, SpO2 98%.   Labs significant for mild , trop 0.04 > 0.03, CK 1905, K 3.2 s/p PO repletion. UA neg, EKG no ischemic changes.  Panscan showed R acetabular fx (has prior hip replacement) but as per ortho non-operative at this stage. (16 Aug 2020 15:15)      PAST MEDICAL & SURGICAL HISTORY:  Mild dementia  Parkinson's disease  Diabetes 1.5, managed as type 2  No significant past surgical history      Hospital Course:  He is falling frequently at [facility] over the last 2 weeks. He stands with max assist. Stood 5 sec only with PT with mod assist.  NWB RLE   TODAY'S SUBJECTIVE & REVIEW OF SYMPTOMS:     Constitutional WNL   Cardio WNL   Resp WNL   GI WNL  Heme WNL  Endo WNL  Skin WNL  MSK WNL  Neuro PD  Cognitive WNL  Psych WNL      MEDICATIONS  (STANDING):  aspirin enteric coated 81 milliGRAM(s) Oral daily  atorvastatin 20 milliGRAM(s) Oral at bedtime  carbidopa/levodopa  25/100 1 Tablet(s) Oral every 6 hours  chlorhexidine 4% Liquid 1 Application(s) Topical <User Schedule>  donepezil 5 milliGRAM(s) Oral daily  enoxaparin Injectable 40 milliGRAM(s) SubCutaneous daily  famotidine    Tablet 20 milliGRAM(s) Oral daily  lactulose Syrup 20 Gram(s) Oral daily  magnesium oxide 400 milliGRAM(s) Oral daily  OLANZapine 10 milliGRAM(s) Oral at bedtime  pyridostigmine  milliGRAM(s) Oral at bedtime  senna 2 Tablet(s) Oral at bedtime  sodium chloride 0.9%. 1000 milliLiter(s) (100 mL/Hr) IV Continuous <Continuous>    MEDICATIONS  (PRN):      FAMILY HISTORY:  No pertinent family history in first degree relatives      Allergies    No Known Allergies    Intolerances        SOCIAL HISTORY:    [  ] Etoh  [  ] Smoking  [  ] Substance abuse     Home Environment:  [  ] Home Alone  [  ] Lives with Family  [  ] Home Health Aid    Dwelling:  [  ] Apartment  [  ] Private House  [x  ] indep living  [  ] Skilled Nursing Facility      [  ] Short Term  [  ] Long Term  [  ] Stairs       Elevator [  ]    FUNCTIONAL STATUS PTA: (Check all that apply)  Ambulation: [   ]Independent    [  ] Dependent     [  ] Non-Ambulatory  Assistive Device: [  ] SA Cane  [  ]  Q Cane  [  ] Walker  [  ]  Wheelchair  ADL : [  ] Independent  [  ]  Dependent       Vital Signs Last 24 Hrs  T(C): 37.1 (18 Aug 2020 15:30), Max: 37.1 (18 Aug 2020 15:30)  T(F): 98.8 (18 Aug 2020 15:30), Max: 98.8 (18 Aug 2020 15:30)  HR: 97 (18 Aug 2020 15:30) (66 - 97)  BP: 125/55 (18 Aug 2020 15:30) (125/55 - 165/95)  BP(mean): --  RR: 18 (18 Aug 2020 15:30) (18 - 19)  SpO2: --      PHYSICAL EXAM: Alert & Oriented X1  GENERAL: NAD, well-groomed, well-developed  HEAD:  Atraumatic, Normocephalic  EYES: EOMI, PERRLA, conjunctiva and sclera clear  NECK: Supple, No JVD, Normal thyroid  CHEST/LUNG: Clear to percussion bilaterally; No rales, rhonchi, wheezing, or rubs  HEART: Regular rate and rhythm; No murmurs, rubs, or gallops  ABDOMEN: Soft, Nontender, Nondistended; Bowel sounds present  EXTREMITIES:  2+ Peripheral Pulses, No clubbing, cyanosis, or edema    NERVOUS SYSTEM:  Cranial Nerves 2-12 intact [  ] Abnormal  [  ]  ROM: WFL all extremities [  ]  Abnormal [  ]  Motor Strength: WFL all extremities  [  ]  Abnormal [  ] pain and weakness RLE  Sensation: intact to light touch [  ] Abnormal [  ]  Reflexes: Symmetric [  ]  Abnormal [  ]   tremor RUE    FUNCTIONAL STATUS:  Bed Mobility: Independent [  ]  Supervision [  ]  Needs Assistance [  ]  N/A [  ]  Transfers: Independent [  ]  Supervision [  ]  Needs Assistance [  ]  N/A [  ]   Ambulation: Independent [  ]  Supervision [  ]  Needs Assistance [  ]  N/A [  ]  ADL: Independent [  ] Requires Assistance [  ] N/A [  ]      LABS:                        10.6   4.59  )-----------( 201      ( 18 Aug 2020 04:30 )             31.6     08-18    142  |  108  |  12  ----------------------------<  87  3.8   |  26  |  0.8    Ca    8.3<L>      18 Aug 2020 04:30  Mg     1.6     08-18    TPro  5.3<L>  /  Alb  3.1<L>  /  TBili  0.6  /  DBili  x   /  AST  42<H>  /  ALT  5   /  AlkPhos  146<H>  08-18          RADIOLOGY & ADDITIONAL STUDIES:    Assesment:

## 2020-08-18 NOTE — CONSULT NOTE ADULT - ASSESSMENT
IMPRESSION: Rehab of   64 yo M with right hip acetabular periprosthetic fx, s/p remote right KORY, Parkinson's dementia    PRECAUTIONS: [ x ] Cardiac  [  ] Respiratory  [  ] Seizures [  ] Contact Isolation  [  ] Droplet Isolation  [ x ] Other  fall precautions    Weight Bearing Status: NWB RLE    RECOMMENDATION:    Out of Bed to Chair     DVT/Decubiti Prophylaxis    REHAB PLAN:     [ x  ] Bedside P/T 3-5 times a week   [   ]   Bedside O/T  2-3 times a week             [   ] No Rehab Therapy Indicated                   [   ]  Speech Therapy   Conditioning/ROM                                    ADL  Bed Mobility                                               Conditioning/ROM  Transfers                                                     Bed Mobility  Sitting /Standing Balance                         Transfers                                        Gait Training                                               Sitting/Standing Balance  Stair Training [   ]Applicable                    Home equipment Eval                                                                        Splinting  [   ] Only      GOALS:   ADL   [ x  ]   Independent                    Transfers  [ x  ] Independent                          Ambulation  [x   ] Independent     [   x ] With device                            [   ]  CG                                                         [   ]  CG                                                                  [   ] CG                            [    ] Min A                                                   [   ] Min A                                                              [   ] Min  A          DISCHARGE PLAN:   [   ]  Good candidate for Intensive Rehabilitation/Hospital based-4A [facility]                                             Will tolerate 3hrs Intensive Rehab Daily                                       [ xx   ]  Short Term Rehab in Skilled Nursing Facility. After completion of STR LTC may be needed.                                       [  xx  ]  Home with Outpatient or VN services                                         [    ]  Possible Candidate for Intensive Hospital based Rehab

## 2020-08-18 NOTE — PROGRESS NOTE ADULT - ASSESSMENT
66y/o M PMHx progressive Parkinson's dementia diagnosed 5 years ago, orthostatic hypotension, presented to the ED form the [facility] s/p multiple falls and generalized weakness.    # Frequent falls   - likely 2/2 Parkinson's dementia and worsening deconditioning vs orthostatic hypotension  - 4 falls in the 2 weeks preceding this admission 7/30, 8/4, 8/5, 8/7 and the 5th on 8/16 is the reason for this admission  - not appropriate for [facility] anymore, will need long-term placement, family on board  - MRI 8/18 based on neuro recs: no acute pathologies, mild cortical volume loss  - per neuro: c/w home dose for sinemet 25/100 1 tab q6,  pyridostigmine 180 mg daily, olazapine 10 mg daily and increase donepezil from 5 mg to 10 mg starting today 8/18  - previously saw Dr. Josette Clark at [facility]    # R acetabular fx  - NWB RLE  - no surgical intervention as per ortho  - PT/rehab      # Elevated CK  - 1905 on 8/17, 1937 on 8/18  - likely due to falls  - c/w NS @ 75, trend in AM    #Mild troponemia, asymptomatic- 0.04>0.03, no EKG changes or complaints, monitor, possibly orthostatic hypotensive?    #DM1.5 (?)- not on meds, monitor FS BID only  #Constipation- c/w senna, lactulose  #Suspected Mg def- c/w supplement    #MISC  DVT ppx: lovenox  GI ppx: pepcid  Diet: DASH, diabetic  Activity: NWB R leg, w/ assistance  Code Status: DNR/DNI, no feeding tubes as per daughter Mandi 66y/o M PMHx progressive Parkinson's dementia diagnosed 5 years ago, orthostatic hypotension, presented to the ED form the [facility] s/p multiple falls and generalized weakness.    # Frequent falls   - likely 2/2 Parkinson's dementia and worsening deconditioning vs orthostatic hypotension  - 4 falls in the 2 weeks preceding this admission 7/30, 8/4, 8/5, 8/7 and the 5th on 8/16 is the reason for this admission  - not appropriate for [facility] anymore, will need long-term placement, family on board  - MRI 8/18 based on neuro recs: no acute pathologies, mild cortical volume loss  - per neuro: c/w home dose for sinemet 25/100 1 tab q6,  pyridostigmine 180 mg daily, olazapine 10 mg daily and increase donepezil from 5 mg to 10 mg starting today 8/18  - previously saw Dr. Josette Clark at [facility]    # R acetabular fx  - NWB RLE  - no surgical intervention as per ortho  - pain control, bowel regimen  - PT/rehab    # Elevated CK  - 1905 on 8/17, 1937 on 8/18  - likely due to falls  - c/w NS @ 75    # Mild troponemia  - asymptomatic  - 0.04>0.03, no EKG changes    # DM  - not on meds  - monitor FS BID only  - F/u A1C    # Hypomagensemia  - Frequent needs to replete    #MISC  DVT ppx: lovenox  GI ppx: pepcid  Diet: DASH, diabetic  Activity: NWB R leg, w/ assistance  Code Status: DNR/DNI, no feeding tubes as per daughter Mandi  Dispo: SNF pending negative covid swab, sent 8/18

## 2020-08-18 NOTE — PROGRESS NOTE ADULT - ASSESSMENT
S/P fall  Parkinson  Autonomic dysfunction  ?brad mcrae        PT rehab   Neuro f/u  MRI report pending

## 2020-08-18 NOTE — PROGRESS NOTE ADULT - SUBJECTIVE AND OBJECTIVE BOX
Preceding noted  Chart reviewed  Will see pt later today Preceding noted  Chart reviewed  Will see pt later today  ------------  Pt seen and prceding noted  spoke to house staff Dr Jones  Pt just returned from MRI      SOCIAL HISTORY: N/A    REVIEW OF SYSTEMS:    Constitutional: No fever, weight loss or fatigue  ENT:  No difficulty hearing, tinnitus, vertigo; No sinus or throat pain  Neck: No pain or stiffness  Respiratory: No cough, wheezing, chills or hemoptysis  Cardiovascular: No chest pain, palpitations, shortness of breath, dizziness or leg swelling  Gastrointestinal: No abdominal or epigastric pain. No nausea, vomiting or hematemesis; No diarrhea or constipation. No melena or hematochezia.  Neurological: No headaches, memory loss, loss of strength, numbness or tremors  Musculoskeletal: No joint pain or swelling; No muscle, back or extremity pain  Psychiatric: No depression, anxiety, mood swings or difficulty sleeping    MEDICATIONS  (STANDING):  aspirin enteric coated 81 milliGRAM(s) Oral daily  atorvastatin 20 milliGRAM(s) Oral at bedtime  carbidopa/levodopa  25/100 1 Tablet(s) Oral every 6 hours  chlorhexidine 4% Liquid 1 Application(s) Topical <User Schedule>  donepezil 5 milliGRAM(s) Oral daily  enoxaparin Injectable 40 milliGRAM(s) SubCutaneous daily  famotidine    Tablet 20 milliGRAM(s) Oral daily  lactulose Syrup 20 Gram(s) Oral daily  magnesium oxide 400 milliGRAM(s) Oral daily  OLANZapine 10 milliGRAM(s) Oral at bedtime  pyridostigmine  milliGRAM(s) Oral at bedtime  senna 2 Tablet(s) Oral at bedtime  sodium chloride 0.9%. 1000 milliLiter(s) (100 mL/Hr) IV Continuous <Continuous>    MEDICATIONS  (PRN):      Vital Signs Last 24 Hrs  T(C): 35.6 (18 Aug 2020 07:52), Max: 36.1 (18 Aug 2020 00:00)  T(F): 96.1 (18 Aug 2020 07:52), Max: 96.9 (18 Aug 2020 00:00)  HR: 92 (18 Aug 2020 07:52) (66 - 963)  BP: 165/95 (18 Aug 2020 07:52) (139/66 - 165/95)  BP(mean): --  RR: 18 (18 Aug 2020 07:52) (18 - 19)  SpO2: 98% (17 Aug 2020 15:30) (98% - 98%)    PHYSICAL EXAM:    Constitutional: NAD,   frail   HEENT: PERRLA, EOMI, Normal Hearing, MMM  Neck: No LAD, No JVD  Back: Normal spine flexure, No CVA tenderness  Respiratory: CTAB/L  Cardiovascular: S1 and S2, RRR, no M/G/R  Gastrointestinal: BS+, soft, NT/ND  Extremities: No peripheral edema  Vascular: 2+ peripheral pulses  Neurological: A/O x 3, no focal deficits    LABS:                        10.6   4.59  )-----------( 201      ( 18 Aug 2020 04:30 )             31.6     08-18    142  |  108  |  12  ----------------------------<  87  3.8   |  26  |  0.8    Ca    8.3<L>      18 Aug 2020 04:30  Mg     1.6     08-18    TPro  5.3<L>  /  Alb  3.1<L>  /  TBili  0.6  /  DBili  x   /  AST  42<H>  /  ALT  5   /  AlkPhos  146<H>  08-18        Drug Screen Urine:  Alcohol Level  Alcohol, Blood: <10 mg/dL (08-16-20 @ 04:05)        RADIOLOGY & ADDITIONAL STUDIES: noted in PACS

## 2020-08-19 ENCOUNTER — TRANSCRIPTION ENCOUNTER (OUTPATIENT)
Age: 65
End: 2020-08-19

## 2020-08-19 VITALS
TEMPERATURE: 96 F | RESPIRATION RATE: 19 BRPM | HEART RATE: 74 BPM | DIASTOLIC BLOOD PRESSURE: 59 MMHG | SYSTOLIC BLOOD PRESSURE: 112 MMHG

## 2020-08-19 LAB
A1C WITH ESTIMATED AVERAGE GLUCOSE RESULT: 5.3 % — SIGNIFICANT CHANGE UP (ref 4–5.6)
ANION GAP SERPL CALC-SCNC: 8 MMOL/L — SIGNIFICANT CHANGE UP (ref 7–14)
BASOPHILS # BLD AUTO: 0.04 K/UL — SIGNIFICANT CHANGE UP (ref 0–0.2)
BASOPHILS NFR BLD AUTO: 0.7 % — SIGNIFICANT CHANGE UP (ref 0–1)
BUN SERPL-MCNC: 15 MG/DL — SIGNIFICANT CHANGE UP (ref 10–20)
CALCIUM SERPL-MCNC: 8.6 MG/DL — SIGNIFICANT CHANGE UP (ref 8.5–10.1)
CHLORIDE SERPL-SCNC: 105 MMOL/L — SIGNIFICANT CHANGE UP (ref 98–110)
CO2 SERPL-SCNC: 24 MMOL/L — SIGNIFICANT CHANGE UP (ref 17–32)
CREAT SERPL-MCNC: 0.7 MG/DL — SIGNIFICANT CHANGE UP (ref 0.7–1.5)
EOSINOPHIL # BLD AUTO: 0.13 K/UL — SIGNIFICANT CHANGE UP (ref 0–0.7)
EOSINOPHIL NFR BLD AUTO: 2.2 % — SIGNIFICANT CHANGE UP (ref 0–8)
ESTIMATED AVERAGE GLUCOSE: 105 MG/DL — SIGNIFICANT CHANGE UP (ref 68–114)
GLUCOSE SERPL-MCNC: 88 MG/DL — SIGNIFICANT CHANGE UP (ref 70–99)
HCT VFR BLD CALC: 31.4 % — LOW (ref 42–52)
HGB BLD-MCNC: 10.6 G/DL — LOW (ref 14–18)
IMM GRANULOCYTES NFR BLD AUTO: 0.3 % — SIGNIFICANT CHANGE UP (ref 0.1–0.3)
LYMPHOCYTES # BLD AUTO: 0.91 K/UL — LOW (ref 1.2–3.4)
LYMPHOCYTES # BLD AUTO: 15.2 % — LOW (ref 20.5–51.1)
MAGNESIUM SERPL-MCNC: 1.6 MG/DL — LOW (ref 1.8–2.4)
MCHC RBC-ENTMCNC: 29.9 PG — SIGNIFICANT CHANGE UP (ref 27–31)
MCHC RBC-ENTMCNC: 33.8 G/DL — SIGNIFICANT CHANGE UP (ref 32–37)
MCV RBC AUTO: 88.7 FL — SIGNIFICANT CHANGE UP (ref 80–94)
MONOCYTES # BLD AUTO: 0.53 K/UL — SIGNIFICANT CHANGE UP (ref 0.1–0.6)
MONOCYTES NFR BLD AUTO: 8.8 % — SIGNIFICANT CHANGE UP (ref 1.7–9.3)
NEUTROPHILS # BLD AUTO: 4.36 K/UL — SIGNIFICANT CHANGE UP (ref 1.4–6.5)
NEUTROPHILS NFR BLD AUTO: 72.8 % — SIGNIFICANT CHANGE UP (ref 42.2–75.2)
NRBC # BLD: 0 /100 WBCS — SIGNIFICANT CHANGE UP (ref 0–0)
PLATELET # BLD AUTO: 181 K/UL — SIGNIFICANT CHANGE UP (ref 130–400)
POTASSIUM SERPL-MCNC: 3.7 MMOL/L — SIGNIFICANT CHANGE UP (ref 3.5–5)
POTASSIUM SERPL-SCNC: 3.7 MMOL/L — SIGNIFICANT CHANGE UP (ref 3.5–5)
RBC # BLD: 3.54 M/UL — LOW (ref 4.7–6.1)
RBC # FLD: 12.8 % — SIGNIFICANT CHANGE UP (ref 11.5–14.5)
SARS-COV-2 RNA SPEC QL NAA+PROBE: SIGNIFICANT CHANGE UP
SODIUM SERPL-SCNC: 137 MMOL/L — SIGNIFICANT CHANGE UP (ref 135–146)
WBC # BLD: 5.99 K/UL — SIGNIFICANT CHANGE UP (ref 4.8–10.8)
WBC # FLD AUTO: 5.99 K/UL — SIGNIFICANT CHANGE UP (ref 4.8–10.8)

## 2020-08-19 RX ORDER — DONEPEZIL HYDROCHLORIDE 10 MG/1
1 TABLET, FILM COATED ORAL
Qty: 0 | Refills: 0 | DISCHARGE
Start: 2020-08-19

## 2020-08-19 RX ORDER — DONEPEZIL HYDROCHLORIDE 10 MG/1
10 TABLET, FILM COATED ORAL DAILY
Refills: 0 | Status: DISCONTINUED | OUTPATIENT
Start: 2020-08-19 | End: 2020-08-19

## 2020-08-19 RX ORDER — DONEPEZIL HYDROCHLORIDE 10 MG/1
1 TABLET, FILM COATED ORAL
Qty: 0 | Refills: 0 | DISCHARGE

## 2020-08-19 RX ADMIN — LACTULOSE 20 GRAM(S): 10 SOLUTION ORAL at 11:46

## 2020-08-19 RX ADMIN — CHLORHEXIDINE GLUCONATE 1 APPLICATION(S): 213 SOLUTION TOPICAL at 05:17

## 2020-08-19 RX ADMIN — Medication 81 MILLIGRAM(S): at 11:46

## 2020-08-19 RX ADMIN — FAMOTIDINE 20 MILLIGRAM(S): 10 INJECTION INTRAVENOUS at 11:46

## 2020-08-19 RX ADMIN — ENOXAPARIN SODIUM 40 MILLIGRAM(S): 100 INJECTION SUBCUTANEOUS at 11:47

## 2020-08-19 RX ADMIN — CARBIDOPA AND LEVODOPA 1 TABLET(S): 25; 100 TABLET ORAL at 11:46

## 2020-08-19 RX ADMIN — CARBIDOPA AND LEVODOPA 1 TABLET(S): 25; 100 TABLET ORAL at 05:18

## 2020-08-19 RX ADMIN — MAGNESIUM OXIDE 400 MG ORAL TABLET 400 MILLIGRAM(S): 241.3 TABLET ORAL at 11:46

## 2020-08-19 RX ADMIN — SODIUM CHLORIDE 100 MILLILITER(S): 9 INJECTION INTRAMUSCULAR; INTRAVENOUS; SUBCUTANEOUS at 01:56

## 2020-08-19 RX ADMIN — CARBIDOPA AND LEVODOPA 1 TABLET(S): 25; 100 TABLET ORAL at 17:42

## 2020-08-19 RX ADMIN — DONEPEZIL HYDROCHLORIDE 10 MILLIGRAM(S): 10 TABLET, FILM COATED ORAL at 11:46

## 2020-08-19 NOTE — DISCHARGE NOTE PROVIDER - NSDCMRMEDTOKEN_GEN_ALL_CORE_FT
Aspir 81 oral delayed release tablet: 1 tab(s) orally once a day  atorvastatin 20 mg oral tablet: 1 tab(s) orally once a day  carbidopa-levodopa 25 mg-100 mg oral tablet: 1 tab(s) orally every 6 hours  donepezil 5 mg oral tablet: 1 tab(s) orally once a day  famotidine 20 mg oral tablet: orally once a day  lactulose 10 g/15 mL oral solution: 30 milliliter(s) orally once a day  magnesium oxide 400 mg (241.3 mg elemental magnesium) oral tablet: 1 tab(s) orally once a day  OLANZapine 10 mg oral tablet: 1 tab(s) orally once a day  pyridostigmine 180 mg oral tablet, extended release: 1 tab(s) orally once a day (at bedtime)  senna oral tablet: 2 tab(s) orally once a day (at bedtime) Aricept 10 mg oral tablet: 1 tab(s) orally once a day  Aspir 81 oral delayed release tablet: 1 tab(s) orally once a day  atorvastatin 20 mg oral tablet: 1 tab(s) orally once a day  carbidopa-levodopa 25 mg-100 mg oral tablet: 1 tab(s) orally every 6 hours  famotidine 20 mg oral tablet: orally once a day  lactulose 10 g/15 mL oral solution: 30 milliliter(s) orally once a day  magnesium oxide 400 mg (241.3 mg elemental magnesium) oral tablet: 1 tab(s) orally once a day  OLANZapine 10 mg oral tablet: 1 tab(s) orally once a day  pyridostigmine 180 mg oral tablet, extended release: 1 tab(s) orally once a day (at bedtime)  senna oral tablet: 2 tab(s) orally once a day (at bedtime)

## 2020-08-19 NOTE — DISCHARGE NOTE PROVIDER - CARE PROVIDERS DIRECT ADDRESSES
[email or web address]

## 2020-08-19 NOTE — PROGRESS NOTE ADULT - SUBJECTIVE AND OBJECTIVE BOX
Pt seen and examined  Preceding events reviewed  MRI head (-)  will discuss with house staff  Full note to follow Pt seen and examined  Preceding events reviewed  MRI head (-)  will discuss with house staff  Full note to follow  ---------------  Pt comfortable  back to his baseline  neuro recommends same meds for now        SOCIAL HISTORY: n/a      MEDICATIONS  (STANDING):  aspirin enteric coated 81 milliGRAM(s) Oral daily  atorvastatin 20 milliGRAM(s) Oral at bedtime  carbidopa/levodopa  25/100 1 Tablet(s) Oral every 6 hours  chlorhexidine 4% Liquid 1 Application(s) Topical <User Schedule>  donepezil 10 milliGRAM(s) Oral daily  enoxaparin Injectable 40 milliGRAM(s) SubCutaneous daily  famotidine    Tablet 20 milliGRAM(s) Oral daily  lactulose Syrup 20 Gram(s) Oral daily  magnesium oxide 400 milliGRAM(s) Oral daily  OLANZapine 10 milliGRAM(s) Oral at bedtime  pyridostigmine  milliGRAM(s) Oral at bedtime  senna 2 Tablet(s) Oral at bedtime  sodium chloride 0.9%. 1000 milliLiter(s) (100 mL/Hr) IV Continuous <Continuous>    MEDICATIONS  (PRN):      Vital Signs Last 24 Hrs  T(C): 36.1 (19 Aug 2020 07:48), Max: 37.1 (18 Aug 2020 15:30)  T(F): 97 (19 Aug 2020 07:48), Max: 98.8 (18 Aug 2020 15:30)  HR: 69 (19 Aug 2020 07:48) (69 - 97)  BP: 162/75 (19 Aug 2020 07:48) (125/55 - 162/75)  BP(mean): --  RR: 18 (19 Aug 2020 07:48) (18 - 18)  SpO2: --    PHYSICAL EXAM:    Constitutional: NAD, well-groomed, well-developed  HEENT: PERRLA, EOMI, Normal Hearing, MMM  Neck: No LAD, No JVD  Back: Normal spine flexure, No CVA tenderness  Respiratory: CTAB/L  Cardiovascular: S1 and S2, RRR, no M/G/R  Gastrointestinal: BS+, soft, NT/ND  Extremities: No peripheral edema  Vascular: 2+ peripheral pulses  Neurological: A/O x 1 , no focal deficits    LABS:                        10.6   5.99  )-----------( 181      ( 19 Aug 2020 05:16 )             31.4     08-19    137  |  105  |  15  ----------------------------<  88  3.7   |  24  |  0.7    Ca    8.6      19 Aug 2020 05:16  Mg     1.6     08-19    TPro  5.3<L>  /  Alb  3.1<L>  /  TBili  0.6  /  DBili  x   /  AST  42<H>  /  ALT  5   /  AlkPhos  146<H>  08-18        Drug Screen Urine:  Alcohol Level  n/a        RADIOLOGY & ADDITIONAL STUDIES: n/a

## 2020-08-19 NOTE — DISCHARGE NOTE PROVIDER - PROVIDER TOKENS
PROVIDER:[TOKEN:[[id number]],FOLLOWUP:[1 week]],PROVIDER:[TOKEN:[[id number]],FOLLOWUP:[2 weeks]]

## 2020-08-19 NOTE — PROGRESS NOTE ADULT - SUBJECTIVE AND OBJECTIVE BOX
HPI  Patient is a 65y old Male who presents with a chief complaint of repeated falls     Currently admitted to medicine with the primary diagnosis of Acetabular fracture     Today is hospital day 3d.     INTERVAL HPI / OVERNIGHT EVENTS:  Patient was examined and seen at bedside. This morning he is resting comfortably in bed and reports no new issues or overnight events.     ROS: Otherwise unremarkable     PAST MEDICAL & SURGICAL HISTORY  Mild dementia  Parkinson's disease  Diabetes 1.5, managed as type 2  No significant past surgical history    ALLERGIES  No Known Allergies    MEDICATIONS  STANDING MEDICATIONS  aspirin enteric coated 81 milliGRAM(s) Oral daily  atorvastatin 20 milliGRAM(s) Oral at bedtime  carbidopa/levodopa  25/100 1 Tablet(s) Oral every 6 hours  chlorhexidine 4% Liquid 1 Application(s) Topical <User Schedule>  donepezil 10 milliGRAM(s) Oral daily  enoxaparin Injectable 40 milliGRAM(s) SubCutaneous daily  famotidine    Tablet 20 milliGRAM(s) Oral daily  lactulose Syrup 20 Gram(s) Oral daily  magnesium oxide 400 milliGRAM(s) Oral daily  OLANZapine 10 milliGRAM(s) Oral at bedtime  pyridostigmine  milliGRAM(s) Oral at bedtime  senna 2 Tablet(s) Oral at bedtime  sodium chloride 0.9%. 1000 milliLiter(s) IV Continuous <Continuous>    PRN MEDICATIONS    VITALS:  T(F): 97  HR: 69  BP: 162/75  RR: 18  SpO2: --    PHYSICAL EXAM  GEN: NAD, Resting comfortably in bed  PULM: Clear to auscultation bilaterally, No wheezes  CVS: Regular rate and rhythm, S1-S2, no murmurs  ABD: Soft, non-tender, non-distended, no guarding  EXT: No edema  NEURO: A&Ox3, no focal deficits    LABS                        10.6   5.99  )-----------( 181      ( 19 Aug 2020 05:16 )             31.4     08-19    137  |  105  |  15  ----------------------------<  88  3.7   |  24  |  0.7    Ca    8.6      19 Aug 2020 05:16  Mg     1.6     08-19    TPro  5.3<L>  /  Alb  3.1<L>  /  TBili  0.6  /  DBili  x   /  AST  42<H>  /  ALT  5   /  AlkPhos  146<H>  08-18                  RADIOLOGY

## 2020-08-19 NOTE — DISCHARGE NOTE PROVIDER - NSDCCPCAREPLAN_GEN_ALL_CORE_FT
PRINCIPAL DISCHARGE DIAGNOSIS  Diagnosis: Acetabular fracture  Assessment and Plan of Treatment: Fracture  A fracture is a break in one of your bones. This can occur from a variety of injuries, especially traumatic ones. Symptoms include pain, bruising, or swelling. Do not use the injured limb. If a fracture is in one of the bones below your waist, do not put weight on that limb unless instructed to do so by your healthcare provider. Crutches or a cane may have been provided. A splint or cast may have been applied by your health care provider. Make sure to keep it dry and follow up with an orthopedist as instructed.  SEEK IMMEDIATE MEDICAL CARE IF YOU HAVE ANY OF THE FOLLOWING SYMPTOMS: numbness, tingling, increasing pain, or weakness in any part of the injured limb.        SECONDARY DISCHARGE DIAGNOSES  Diagnosis: Parkinson disease  Assessment and Plan of Treatment: Your Donepazil has been increased to 10mg daily. Please continue to take all your medications. Follow up with Neurology as outpatient for medication optimization.

## 2020-08-19 NOTE — PROGRESS NOTE ADULT - ASSESSMENT
64y/o M PMHx progressive Parkinson's dementia diagnosed 5 years ago, orthostatic hypotension, presented to the ED form the [facility] s/p multiple falls and generalized weakness.    # Frequent falls   - likely 2/2 Parkinson's dementia and worsening deconditioning vs orthostatic hypotension  - 4 falls in the 2 weeks preceding this admission 7/30, 8/4, 8/5, 8/7 and the 5th on 8/16 is the reason for this admission  - not appropriate for [facility] anymore, will need long-term placement, family on board  - MRI 8/18 based on neuro recs: no acute pathologies, mild cortical volume loss  - per neuro: c/w home dose for sinemet 25/100 1 tab q6,  pyridostigmine 180 mg daily, olazapine 10 mg daily  - increased donepazil to 10mg today   - previously saw Dr. Josette Clark at [facility]    # R acetabular fx  - NWB RLE  - no surgical intervention as per ortho  - pain control, bowel regimen  - PT/rehab    # Elevated CK  - 1905 on 8/17, 1937 on 8/18  - likely due to falls  - c/w NS @ 75    # Mild troponemia  - asymptomatic  - 0.04>0.03, no EKG changes    # DM  - not on meds  - monitor FS BID only  - F/u A1C    # Hypomagensemia  - Frequent needs to replete    #MISC  DVT ppx: lovenox  GI ppx: pepcid  Diet: DASH, diabetic  Activity: NWB R leg, w/ assistance  Code Status: DNR/DNI, no feeding tubes as per daughter Mandi  Dispo: SNF pending negative covid swab, sent 8/18

## 2020-08-19 NOTE — DISCHARGE NOTE NURSING/CASE MANAGEMENT/SOCIAL WORK - PATIENT PORTAL LINK FT
You can access the FollowMyHealth Patient Portal offered by [facility] by registering at the following website: [email or web address]. By joining [facility]’s FollowMyHealth portal, you will also be able to view your health information using other applications (apps) compatible with our system.

## 2020-08-19 NOTE — DISCHARGE NOTE PROVIDER - HOSPITAL COURSE
66y/o M from [facility] with PMH of Parkinson's dementia rapidly progressive, orthostatic  DM presents to ED after multiple falls. In last 2 weeks fell 7/30, 8/4, 8/5, 8/7 and again day of admit. No complaints except generalized weakness and tiredness. He remembers the falls and said that even with walker/cane he is so unsteady.        In ED: T98.6, HR 84, /82, RR18, SpO2 98%.     Labs significant for mild , trop 0.04 > 0.03, CK 1905, K 3.2 s/p PO repletion. UA neg, EKG no ischemic changes.    Panscan showed R acetabular fx (has prior hip replacement) but as per ortho non-operative at this stage.    Neurology was following up for medication optimization. At this time as his orthostatics are negative, he can follow up as outpatient to change his medications. 66y/o M from [facility] with PMH of Parkinson's dementia rapidly progressive, orthostatic  DM presents to ED after multiple falls. In last 2 weeks fell 7/30, 8/4, 8/5, 8/7 and again day of admit. No complaints except generalized weakness and tiredness. He remembers the falls and said that even with walker/cane he is so unsteady.        In ED: T98.6, HR 84, /82, RR18, SpO2 98%.     Labs significant for mild , trop 0.04 > 0.03, CK 1905, K 3.2 s/p PO repletion. UA neg, EKG no ischemic changes.    Panscan showed R acetabular fx (has prior hip replacement) but as per ortho non-operative at this stage.    Neurology was following up for medication optimization. MRI: no acute intracranial pathology. At this time as his orthostatics are negative, he can follow up as outpatient to change his medications. Donepazil increased to 10mg in the hospital, all other medications continue as is.

## 2020-08-19 NOTE — DISCHARGE NOTE PROVIDER - CARE PROVIDER_API CALL
Nicolás Eddy  NEUROLOGY  [address] [place] [address]  Phone: [phone]  Fax: [phone]  Follow Up Time: 1 week    Jj Agrawal)  Orthopaedic Surgery  [facility] [place] [address]  Phone: [phone]  Fax: [phone]  Follow Up Time: 2 weeks

## 2020-08-19 NOTE — PROGRESS NOTE ADULT - ASSESSMENT
s/p falls  Parkinson  autonomic dysfunction  Gait abnormality      Needs STR at SNF  other meds same
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
## 2020-08-21 DIAGNOSIS — R29.6 REPEATED FALLS: ICD-10-CM

## 2020-08-21 DIAGNOSIS — E11.9 TYPE 2 DIABETES MELLITUS WITHOUT COMPLICATIONS: ICD-10-CM

## 2020-08-21 DIAGNOSIS — M97.01XA PERIPROSTHETIC FRACTURE AROUND INTERNAL PROSTHETIC RIGHT HIP JOINT, INITIAL ENCOUNTER: ICD-10-CM
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
## 2020-08-21 DIAGNOSIS — Z66 DO NOT RESUSCITATE: ICD-10-CM

## 2020-08-21 DIAGNOSIS — E87.6 HYPOKALEMIA: ICD-10-CM

## 2020-08-21 DIAGNOSIS — K59.00 CONSTIPATION, UNSPECIFIED: ICD-10-CM

## 2020-08-21 DIAGNOSIS — Z91.81 HISTORY OF FALLING: ICD-10-CM

## 2020-08-21 DIAGNOSIS — E83.42 HYPOMAGNESEMIA: ICD-10-CM

## 2020-08-21 DIAGNOSIS — S32.421A DISPLACED FRACTURE OF POSTERIOR WALL OF RIGHT ACETABULUM, INITIAL ENCOUNTER FOR CLOSED FRACTURE: ICD-10-CM

## 2020-08-21 DIAGNOSIS — Z79.82 LONG TERM (CURRENT) USE OF ASPIRIN: ICD-10-CM

## 2020-08-21 DIAGNOSIS — G31.83 NEUROCOGNITIVE DISORDER WITH LEWY BODIES: ICD-10-CM

## 2020-08-21 DIAGNOSIS — F02.80 DEMENTIA IN OTHER DISEASES CLASSIFIED ELSEWHERE, UNSPECIFIED SEVERITY, WITHOUT BEHAVIORAL DISTURBANCE, PSYCHOTIC DISTURBANCE, MOOD DISTURBANCE, AND ANXIETY: ICD-10-CM

## 2020-09-14 NOTE — ED ADULT NURSE NOTE - CAS DISCH TRANSFER METHOD
Dear Blanquita,    Here is a summary of your recent test results:    -Normal red blood cell (hgb) levels, normal white blood cell count and normal platelet levels.  -LDL(bad) cholesterol level is elevated which can increase your heart disease risk.  A diet high in fat and simple carbohydrates, genetics and being overweight can contribute to this. ADVISE: exercising 150 minutes of aerobic exercise per week (30 minutes for 5 days per week or 50 minutes for 3 days per week are options) and eating a low saturated fat/low carbohydrate diet are helpful to improve this. We will recheck this in 12 months with your next physical.   -Kidney function (GFR) is normal.  -Sodium is normal.  -Potassium is normal.  -Calcium is normal.  -Glucose is slight elevated and may be a sign of early diabetes (prediabetes). ADVISE:: eating a low carbohydrate diet, exercising, trying to lose weight (if necessary) and rechecking your glucose level in 12 months.  -TSH (thyroid stimulating hormone) level is normal which indicates normal thyroid function.    For additional lab test information, labtestsonline.org is an excellent reference.    In addition, here is a list of due or overdue Health Maintenance reminders:    Zoster (Shingles) Vaccine(2 of 3) due on 11/24/2012    Please call us at [phone] (or use [employer]) to address the above recommendations if needed.    Thank you for choosing [facility].  It was an honor and a privilege to participate in your care.       Healthy regards,    Julia Mills, CANELO  [facility]

## 2020-12-10 NOTE — DIETITIAN INITIAL EVALUATION ADULT. - OBTAIN CURRENT WEIGHT
Patient states that he is \"sick as a dog\", he was in for an appt yesterday and felt okay, but now he is sick please call and discuss.    yes

## 2020-12-14 ENCOUNTER — APPOINTMENT (OUTPATIENT)
Dept: NEUROLOGY | Facility: CLINIC | Age: 65
End: 2020-12-14

## 2021-01-06 NOTE — ED ADULT NURSE NOTE - CHIEF COMPLAINT QUOTE
Called and spoke to pt, informed him of Dr. Zaragoza message. Pt understood and indicated he would reach out to us if he has any additional questions or concerns. Advised pt a portal message is best.   EARNEST from [place] as per EMS "He was walking about two hours ago and fell. and he has chest pain, no loc". pt currently on ASA EC 81 mg, fs 106

## 2022-04-30 NOTE — PATIENT PROFILE ADULT - HOME ACCESSIBILITY CONCERNS
"   Patient:   Reji Plasencia            MRN: [id number]            FIN: [id number]               Age:   55 years     Sex:  Male     :  1962   Associated Diagnoses:   Left rotator cuff tear; Tobacco abuse counseling   Author:   Piotr Mckeon NP      Chief Complaint   2017 13:30 CDT      REferred for Lt. RTC        History of Present Illness   54 yo  male presents to ortho clinic for c/o left shoulder pain.  Reports that he had a fall in 2015 and landed on left shoulder causing severe pain.  Had previous MRI done in  which showed "I tore my rotator cuff" and was scheduled to have surgery but was arrested and spent two years in prison.  Recently released and is looking at having the left shoulder surgery now if possible.  Reports that he is still having a great deal of pain to left shoulder that effects his daily activities.  Works as a  and is RHD.  Admits going to PT in the past with minimal relief.  Denies ever receiving shoulder injections for symptom relief.  Admits taking OTC medication as needed for pain with moderate relief.  Current daily smoker.  No other complaints today.      Review of Systems   ROS reviewed as documented in chart      Health Status   Allergies:    Allergic Reactions (Selected)  No Known Allergies,    Allergies (1) Active Reaction  No Known Allergies None Documented     Current medications:  (Selected)   Documented Medications  Documented  Clindamycin Hcl 300 Mg Capsule: 300 mg = 1 cap(s), Oral, q8hr  Glipizide Xl 2.5 Mg Tablet: 2.5 mg = 1 tab(s), Oral, Daily  Sulfamethoxazole/Tmp Ds Tab: 1 tab(s), Oral, BID,    Home Medications (3) Active  Clindamycin Hcl 300 Mg Capsule 300 mg = 1 cap(s), Oral, q8hr  Glipizide Xl 2.5 Mg Tablet 2.5 mg = 1 tab(s), Oral, Daily  Sulfamethoxazole/Tmp Ds Tab 1 tab(s), Oral, BID  ,    No qualifying data available     Problem list:    All Problems  Diabetes mellitus / SNOMED CT 918187504 / Confirmed  Hepatitis C / " SNOMED CT 42462223 / Confirmed  Tobacco user / SNOMED CT 961187617 / Probable  Canceled: No Chronic Problems / Cerner NKP,    Active Problems (3)  Diabetes mellitus   Hepatitis C   Tobacco user         Histories   Past Medical History:    No active or resolved past medical history items have been selected or recorded.   Family History:    Heart disease  Father  CAD (coronary artery disease)  Father  Heart attack  Father     Procedure history:    back surgery.   Social History        Social & Psychosocial Habits    Alcohol  03/07/2017  Use: Never    Employment/School  05/02/2017  Status: Unemployed    Highest education: High school    Comment: 10TH GRADE-GED - 05/02/2017 13:11 - oRnnie Bonilla LPNissa    Home/Environment  05/02/2017  Lives with: Children, Significant other    Living situation: Home/Independent    Alcohol abuse in household: No    Substance abuse in household: No    Smoker in household: No    Feels unsafe at home: No    Safe place to go: Yes    Family/Friends available to help: Yes    Nutrition/Health  05/02/2017  Type of diet: Regular    Caffeine intake amount: 2 CUPS OF COFFEE PER DAY    Substance Abuse  03/07/2017  Use: Never    Tobacco  05/02/2017  Use: Current every day smoker    Tobacco use per day: 10    Number of years: 25  .        Physical Examination   Measurements from flowsheet : Measurements   8/30/2017 13:30 CDT      Weight Dosing             76.6 kg                             Weight Measured           76.6 kg                             Weight Measured and Calculated in Lbs     168.87 lb                             Height/Length Dosing      170.18 cm                             Height/Length Measured    170.18 cm                             BSA Measured              1.9 m2                             Body Mass Index Measured  26.45 kg/m2     General:  Alert and oriented.    HENT:  Normocephalic.    Neck:  Supple.    Integumentary:  Warm, Dry, Pink.    Neurologic:  No focal deficits.     Cognition and Speech:  Speech clear and coherent.    Psychiatric:  Appropriate mood & affect.    Left shoulder exam:  Limited ROM with increased pain associated with upward/lateral movement.  TTP to supraspinatus tendon.  Supraspinatus stress (+).  Drop arm (-).  Fernandez (+).  Neer (+).  NV intact.  No swelling or gross deformity noted.  +2 radial pulse.  FF = 120.  ER = 30.  IR = sacrum.  Abduction = 100.  Mild periscapular atrophy noted.         Health Maintenance      Health Maintenance     Pending (in the next year)        OverDue           Diabetes Maintenance-Fasting Lipid Profile due  03/06/15  and every 1  year(s)           Diabetes Maintenance-Serum Creatinine due  03/06/15  and every 1  year(s)           Lipid Screening due  03/06/15  and every 1  year(s)        Due            Aspirin Therapy for CVD Prevention due  08/30/17  and every 1  year(s)           Colorectal Screening due  08/30/17  Variable frequency           Diabetes Maintenance-Eye Exam due  08/30/17  Variable frequency           Diabetes Maintenance-Foot Exam due  08/30/17  Variable frequency           Diabetes Maintenance-HgbA1c due  08/30/17  Variable frequency           Diabetes Maintenance-Microalbumin due  08/30/17  Variable frequency           Diabetes Maintenance-Urine Dipstick due  08/30/17  Variable frequency           Tetanus Vaccine due  08/30/17  and every 10  year(s)        Due In Future            Influenza Vaccine not due until  10/02/17  and every 1  year(s)           HIV Screening not due until  05/02/18  and every 1  year(s)           Blood Pressure Screening not due until  05/02/18  and every 1  year(s)           Depression Screening not due until  05/02/18  and every 1  year(s)           Alcohol Misuse Screening not due until  05/02/18  and every 1  year(s)     Satisfied (in the past 1 year)        Satisfied            Alcohol Misuse Screening on  05/02/17.  Satisfied by Thomas NGUYEN, Tabatha           Blood Pressure  Screening on  05/02/17.  Satisfied by Marie Bonilla LPN           Body Mass Index Check on  08/30/17.  Satisfied by Vashti De Luna LPN           Depression Screening on  05/02/17.  Satisfied by Marie Bonilla LPN           Diabetes Maintenance-HgbA1c on  05/02/17.  Satisfied by Tabatha Guzman MD           HIV Screening on  05/02/17.  Satisfied by Tabatha Guzman MD           Lipid Screening on  05/02/17.  Satisfied by Tabatha Guzman MD           Obesity Screening on  08/30/17.  Satisfied by Vashti De Luna LPN           Smoking Cessation on  08/30/17.  Satisfied by Vashti De Luna LPN           Tobacco Use Screening on  08/30/17.  Satisfied by Vashti De Luna LPN          Review / Management   Results review:     No qualifying data available.      Right shoulder MRI (6/20/15):    Impression:  1.  Full-thickness tears involving supraspinatus and infraspinatus tendons with distal tendon stump retracted to near the level of the glenoid, associated with mild fatty atrophy.  2.  Full-thickness partial width tear involving distal upper fibers of the subscapularis tendon with underlying severe tendinopathy and partial tears involving the mid to lower aspect, also associated with mild fatty atrophy.  3.  Associated early exit of long biceps tendon from the bicipital groove with partial tear at the far lateral rotator interval and in its course over the humeral head.  Mild degeneration of the glenoid labrum.  4.  Fairly severe AC joint arthrosis with slightly convex curved acromion and small anterior acromial hook as well as mild thickening of coracoarcromial ligament toward its acromial attachment.  Please correlate clinically for subacromial impingement.      Impression and Plan   Diagnosis     Left rotator cuff tear (ZJC10-BW M75.102).     Tobacco abuse counseling (LXU22-TN Z71.6).       Plan:  1.  Will order new MRI without contrast of left shoulder for further evaluation to determine in  pt. is a surgical candidate.  2.  ROM/strengthening exercises to left UE (printed instructions and exercise bands given to pt.).  3.  Diclofenac sodium precription given for pain relief.  4.  Ice compresses and activity modifications as needed for symptom relief.  5.  Discussed risks of smoking and pt. does not want to quit at this time.  6.  RTC after MRI complete.      Patient Instructions:  Steps to Quit Smoking, Rotator Cuff Injury.     none

## 2022-05-05 NOTE — ED PROCEDURE NOTE - NS ED ATTENDING STATEMENT MOD
Impression: Ocular pain, right eye: H57.11.  Plan: no clinical findings to cause eye pain, possible sinus problems or history of jaw surgery, monitor for now
I have personally performed a face to face diagnostic evaluation on this patient. I have reviewed the ACP note and agree with the history, exam and plan of care, except as noted.
I have personally performed a face to face diagnostic evaluation on this patient. I have reviewed the ACP note and agree with the history, exam and plan of care, except as noted.
denies pain/discomfort

## 2023-01-12 NOTE — INPATIENT CERTIFICATION FOR MEDICARE PATIENTS - IN ORDER TO MEET MEDICARE REQUIREMENTS.
In order to meet Medicare requirements, the clinical documentation must support the information cited in the admission order.  Please be sure to provide detailed and clear documentation about the following in the admitting note/history and physical:
Opt out

## 2023-09-13 NOTE — H&P ADULT - NSHPLABSRESULTS_GEN_ALL_CORE
- Discussed diet and lifestyle modifications with patient  - Patient will need to follow up with PCP for outpatient management     
Atorvastatin  
Cardiology following case discussed with cardiology   Plan is for cardiac cath tomorrow     Echocardiogram with EF of 65%, normal systolic function.   Close monitoring on tele for decompensation     
Continue with amlodipine-monitor closely as patient did have some hypotension admission  
Nocturnal CPAP  
< from: CT Angio Chest Dissection Protocol (05.18.20 @ 19:33) >    IMPRESSION: No evidence of aortic aneurysm or dissection.    There is mild deformity of the right lateral seventh rib compatible with a nondisplaced fracture.     < end of copied text >    < from: CT Cervical Spine No Cont (05.18.20 @ 19:32) >    IMPRESSION:    1. No acute osseous abnormality.  2. Diffuse degenerative change as above.    < end of copied text >    < from: CT Head No Cont (05.18.20 @ 19:31) >    IMPRESSION:    No acute intracranial abnormalities.    < end of copied text >    Lactate, Blood (05.18.20 @ 17:00)    Lactate, Blood: 6.8: TYPE:(C=Critical, N=Notification, A=Abnormal) C  Blood Gas Venous - Lactate (05.18.20 @ 21:31)    Blood Gas Venous - Lactate: 2.2: Elevated lactate. Consider ordering follow-up lactate to trend. mmoL/L

## 2023-11-29 NOTE — ED PROVIDER NOTE - NS ED MD DISPO SPECIAL CONSIDERATION1
Use Enhanced Medication Counseling?: No Bactrim Counseling:  I discussed with the patient the risks of sulfa antibiotics including but not limited to GI upset, allergic reaction, drug rash, diarrhea, dizziness, photosensitivity, and yeast infections.  Rarely, more serious reactions can occur including but not limited to aplastic anemia, agranulocytosis, methemoglobinemia, blood dyscrasias, liver or kidney failure, lung infiltrates or desquamative/blistering drug rashes. Aklief Pregnancy And Lactation Text: It is unknown if this medication is safe to use during pregnancy.  It is unknown if this medication is excreted in breast milk.  Breastfeeding women should use the topical cream on the smallest area of the skin for the shortest time needed while breastfeeding.  Do not apply to nipple and areola. Tazorac Counseling:  Patient advised that medication is irritating and drying.  Patient may need to apply sparingly and wash off after an hour before eventually leaving it on overnight.  The patient verbalized understanding of the proper use and possible adverse effects of tazorac.  All of the patient's questions and concerns were addressed. Azelaic Acid Pregnancy And Lactation Text: This medication is considered safe during pregnancy and breast feeding. Topical Clindamycin Counseling: Patient counseled that this medication may cause skin irritation or allergic reactions.  In the event of skin irritation, the patient was advised to reduce the amount of the drug applied or use it less frequently.   The patient verbalized understanding of the proper use and possible adverse effects of clindamycin.  All of the patient's questions and concerns were addressed. Birth Control Pills Counseling: Birth Control Pill Counseling: I discussed with the patient the potential side effects of OCPs including but not limited to increased risk of stroke, heart attack, thrombophlebitis, deep venous thrombosis, hepatic adenomas, breast changes, GI upset, headaches, and depression.  The patient verbalized understanding of the proper use and possible adverse effects of OCPs. All of the patient's questions and concerns were addressed. Erythromycin Pregnancy And Lactation Text: This medication is Pregnancy Category B and is considered safe during pregnancy. It is also excreted in breast milk. Winlevi Pregnancy And Lactation Text: This medication is considered safe during pregnancy and breastfeeding. Sarecycline Counseling: Patient advised regarding possible photosensitivity and discoloration of the teeth, skin, lips, tongue and gums.  Patient instructed to avoid sunlight, if possible.  When exposed to sunlight, patients should wear protective clothing, sunglasses, and sunscreen.  The patient was instructed to call the office immediately if the following severe adverse effects occur:  hearing changes, easy bruising/bleeding, severe headache, or vision changes.  The patient verbalized understanding of the proper use and possible adverse effects of sarecycline.  All of the patient's questions and concerns were addressed. Spironolactone Pregnancy And Lactation Text: This medication can cause feminization of the male fetus and should be avoided during pregnancy. The active metabolite is also found in breast milk. Tetracycline Pregnancy And Lactation Text: This medication is Pregnancy Category D and not consider safe during pregnancy. It is also excreted in breast milk. Topical Retinoid counseling:  Patient advised to apply a pea-sized amount only at bedtime and wait 30 minutes after washing their face before applying.  If too drying, patient may add a non-comedogenic moisturizer. The patient verbalized understanding of the proper use and possible adverse effects of retinoids.  All of the patient's questions and concerns were addressed. Topical Sulfur Applications Pregnancy And Lactation Text: This medication is Pregnancy Category C and has an unknown safety profile during pregnancy. It is unknown if this topical medication is excreted in breast milk. Minocycline Counseling: Patient advised regarding possible photosensitivity and discoloration of the teeth, skin, lips, tongue and gums.  Patient instructed to avoid sunlight, if possible.  When exposed to sunlight, patients should wear protective clothing, sunglasses, and sunscreen.  The patient was instructed to call the office immediately if the following severe adverse effects occur:  hearing changes, easy bruising/bleeding, severe headache, or vision changes.  The patient verbalized understanding of the proper use and possible adverse effects of minocycline.  All of the patient's questions and concerns were addressed. Doxycycline Pregnancy And Lactation Text: This medication is Pregnancy Category D and not consider safe during pregnancy. It is also excreted in breast milk but is considered safe for shorter treatment courses. Azithromycin Counseling:  I discussed with the patient the risks of azithromycin including but not limited to GI upset, allergic reaction, drug rash, diarrhea, and yeast infections. Benzoyl Peroxide Counseling: Patient counseled that medicine may cause skin irritation and bleach clothing.  In the event of skin irritation, the patient was advised to reduce the amount of the drug applied or use it less frequently.   The patient verbalized understanding of the proper use and possible adverse effects of benzoyl peroxide.  All of the patient's questions and concerns were addressed. Topical Clindamycin Pregnancy And Lactation Text: This medication is Pregnancy Category B and is considered safe during pregnancy. It is unknown if it is excreted in breast milk. Dapsone Pregnancy And Lactation Text: This medication is Pregnancy Category C and is not considered safe during pregnancy or breast feeding. High Dose Vitamin A Counseling: Side effects reviewed, pt to contact office should one occur. Detail Level: Zone Bactrim Pregnancy And Lactation Text: This medication is Pregnancy Category D and is known to cause fetal risk.  It is also excreted in breast milk. Erythromycin Counseling:  I discussed with the patient the risks of erythromycin including but not limited to GI upset, allergic reaction, drug rash, diarrhea, increase in liver enzymes, and yeast infections. Tazorac Pregnancy And Lactation Text: This medication is not safe during pregnancy. It is unknown if this medication is excreted in breast milk. Azelaic Acid Counseling: Patient counseled that medicine may cause skin irritation and to avoid applying near the eyes.  In the event of skin irritation, the patient was advised to reduce the amount of the drug applied or use it less frequently.   The patient verbalized understanding of the proper use and possible adverse effects of azelaic acid.  All of the patient's questions and concerns were addressed. Birth Control Pills Pregnancy And Lactation Text: This medication should be avoided if pregnant and for the first 30 days post-partum. Isotretinoin Counseling: Patient should get monthly blood tests, not donate blood, not drive at night if vision affected, not share medication, and not undergo elective surgery for 6 months after tx completed. Side effects reviewed, pt to contact office should one occur. Aklief counseling:  Patient advised to apply a pea-sized amount only at bedtime and wait 30 minutes after washing their face before applying.  If too drying, patient may add a non-comedogenic moisturizer.  The most commonly reported side effects including irritation, redness, scaling, dryness, stinging, burning, itching, and increased risk of sunburn.  The patient verbalized understanding of the proper use and possible adverse effects of retinoids.  All of the patient's questions and concerns were addressed. Topical Retinoid Pregnancy And Lactation Text: This medication is Pregnancy Category C. It is unknown if this medication is excreted in breast milk. Winlevi Counseling:  I discussed with the patient the risks of topical clascoterone including but not limited to erythema, scaling, itching, and stinging. Patient voiced their understanding. Spironolactone Counseling: Patient advised regarding risks of diarrhea, abdominal pain, hyperkalemia, birth defects (for female patients), liver toxicity and renal toxicity. The patient may need blood work to monitor liver and kidney function and potassium levels while on therapy. The patient verbalized understanding of the proper use and possible adverse effects of spironolactone.  All of the patient's questions and concerns were addressed. Dapsone Counseling: I discussed with the patient the risks of dapsone including but not limited to hemolytic anemia, agranulocytosis, rashes, methemoglobinemia, kidney failure, peripheral neuropathy, headaches, GI upset, and liver toxicity.  Patients who start dapsone require monitoring including baseline LFTs and weekly CBCs for the first month, then every month thereafter.  The patient verbalized understanding of the proper use and possible adverse effects of dapsone.  All of the patient's questions and concerns were addressed. Isotretinoin Pregnancy And Lactation Text: This medication is Pregnancy Category X and is considered extremely dangerous during pregnancy. It is unknown if it is excreted in breast milk. Tetracycline Counseling: Patient counseled regarding possible photosensitivity and increased risk for sunburn.  Patient instructed to avoid sunlight, if possible.  When exposed to sunlight, patients should wear protective clothing, sunglasses, and sunscreen.  The patient was instructed to call the office immediately if the following severe adverse effects occur:  hearing changes, easy bruising/bleeding, severe headache, or vision changes.  The patient verbalized understanding of the proper use and possible adverse effects of tetracycline.  All of the patient's questions and concerns were addressed. Patient understands to avoid pregnancy while on therapy due to potential birth defects. Benzoyl Peroxide Pregnancy And Lactation Text: This medication is Pregnancy Category C. It is unknown if benzoyl peroxide is excreted in breast milk. Topical Sulfur Applications Counseling: Topical Sulfur Counseling: Patient counseled that this medication may cause skin irritation or allergic reactions.  In the event of skin irritation, the patient was advised to reduce the amount of the drug applied or use it less frequently.   The patient verbalized understanding of the proper use and possible adverse effects of topical sulfur application.  All of the patient's questions and concerns were addressed. Doxycycline Counseling:  Patient counseled regarding possible photosensitivity and increased risk for sunburn.  Patient instructed to avoid sunlight, if possible.  When exposed to sunlight, patients should wear protective clothing, sunglasses, and sunscreen.  The patient was instructed to call the office immediately if the following severe adverse effects occur:  hearing changes, easy bruising/bleeding, severe headache, or vision changes.  The patient verbalized understanding of the proper use and possible adverse effects of doxycycline.  All of the patient's questions and concerns were addressed. Azithromycin Pregnancy And Lactation Text: This medication is considered safe during pregnancy and is also secreted in breast milk. High Dose Vitamin A Pregnancy And Lactation Text: High dose vitamin A therapy is contraindicated during pregnancy and breast feeding. None

## 2024-02-02 NOTE — PATIENT PROFILE ADULT - NSPROHMSYMPCOND_GEN_A_NUR
Caller: Giovani Moseley    Relationship: Self    Best call back number: [phone]    Requested Prescriptions:   Requested Prescriptions     Pending Prescriptions Disp Refills    Bempedoic Acid-Ezetimibe 180-10 MG tablet 30 tablet 11     Sig: Take 1 tablet by mouth Daily.        Pharmacy where request should be sent:  [facility] [id number] [address] [facility] [address] [facility] - [phone]  - [phone]  [phone]     Last office visit with prescribing clinician: 2/2/2024   Last telemedicine visit with prescribing clinician: Visit date not found   Next office visit with prescribing clinician: 2/4/2025       Does the patient have less than a 3 day supply:  [x] Yes  [] No    PATIENT IS REQUESTING A 90 DAY SUPPLY    Would you like a call back once the refill request has been completed: [x] Yes [] No    If the office needs to give you a call back, can they leave a voicemail: [x] Yes [] No    Santana Aguilar Rep   02/02/24 10:53 EST          neurologic

## 2024-05-20 NOTE — DISCHARGE NOTE NURSING/CASE MANAGEMENT/SOCIAL WORK - PATIENT PORTAL LINK FT
(M6) obeys commands
You can access the FollowMyHealth Patient Portal offered by [facility] by registering at the following website: [email or web address]. By joining [facility]’s FollowMyHealth portal, you will also be able to view your health information using other applications (apps) compatible with our system.
